# Patient Record
Sex: MALE | Race: WHITE | Employment: OTHER | URBAN - NONMETROPOLITAN AREA
[De-identification: names, ages, dates, MRNs, and addresses within clinical notes are randomized per-mention and may not be internally consistent; named-entity substitution may affect disease eponyms.]

---

## 2017-03-29 ENCOUNTER — OFFICE VISIT (OUTPATIENT)
Dept: FAMILY MEDICINE CLINIC | Age: 82
End: 2017-03-29

## 2017-03-29 VITALS
RESPIRATION RATE: 15 BRPM | OXYGEN SATURATION: 98 % | WEIGHT: 190 LBS | HEART RATE: 65 BPM | SYSTOLIC BLOOD PRESSURE: 116 MMHG | DIASTOLIC BLOOD PRESSURE: 74 MMHG | BODY MASS INDEX: 32.61 KG/M2

## 2017-03-29 DIAGNOSIS — I51.9 SYSTOLIC DYSFUNCTION, LEFT VENTRICLE: ICD-10-CM

## 2017-03-29 DIAGNOSIS — Z87.11 HISTORY OF PEPTIC ULCER: ICD-10-CM

## 2017-03-29 DIAGNOSIS — I20.8 ANGINA EFFORT: ICD-10-CM

## 2017-03-29 DIAGNOSIS — G25.0 ESSENTIAL TREMOR: ICD-10-CM

## 2017-03-29 DIAGNOSIS — D50.0 IRON DEFICIENCY ANEMIA DUE TO CHRONIC BLOOD LOSS: ICD-10-CM

## 2017-03-29 DIAGNOSIS — R53.83 FATIGUE, UNSPECIFIED TYPE: ICD-10-CM

## 2017-03-29 DIAGNOSIS — I25.10 CORONARY ARTERY DISEASE INVOLVING NATIVE CORONARY ARTERY OF NATIVE HEART WITHOUT ANGINA PECTORIS: ICD-10-CM

## 2017-03-29 DIAGNOSIS — D64.89 ANEMIA DUE TO OTHER CAUSE: ICD-10-CM

## 2017-03-29 DIAGNOSIS — N18.31 CHRONIC KIDNEY DISEASE (CKD) STAGE G3A/A1, MODERATELY DECREASED GLOMERULAR FILTRATION RATE (GFR) BETWEEN 45-59 ML/MIN/1.73 SQUARE METER AND ALBUMINURIA CREATININE RATIO LESS THAN 30 MG/G (HCC): ICD-10-CM

## 2017-03-29 DIAGNOSIS — I25.10 CORONARY ARTERY DISEASE INVOLVING NATIVE HEART WITHOUT ANGINA PECTORIS, UNSPECIFIED VESSEL OR LESION TYPE: ICD-10-CM

## 2017-03-29 DIAGNOSIS — I50.22 CHRONIC SYSTOLIC CHF (CONGESTIVE HEART FAILURE) (HCC): ICD-10-CM

## 2017-03-29 DIAGNOSIS — I10 ESSENTIAL HYPERTENSION: ICD-10-CM

## 2017-03-29 DIAGNOSIS — R06.09 DYSPNEA ON EXERTION: Primary | ICD-10-CM

## 2017-03-29 PROBLEM — D64.9 ANEMIA: Status: ACTIVE | Noted: 2017-03-29

## 2017-03-29 LAB
A/G RATIO: 1.6 (ref 1.1–2.2)
ALBUMIN SERPL-MCNC: 4.4 G/DL (ref 3.4–5)
ALP BLD-CCNC: 60 U/L (ref 40–129)
ALT SERPL-CCNC: 11 U/L (ref 10–40)
ANION GAP SERPL CALCULATED.3IONS-SCNC: 17 MMOL/L (ref 3–16)
AST SERPL-CCNC: 18 U/L (ref 15–37)
BASOPHILS ABSOLUTE: 0.1 K/UL (ref 0–0.2)
BASOPHILS RELATIVE PERCENT: 1 %
BILIRUB SERPL-MCNC: 0.4 MG/DL (ref 0–1)
BUN BLDV-MCNC: 32 MG/DL (ref 7–20)
CALCIUM SERPL-MCNC: 9.3 MG/DL (ref 8.3–10.6)
CHLORIDE BLD-SCNC: 102 MMOL/L (ref 99–110)
CO2: 21 MMOL/L (ref 21–32)
CREAT SERPL-MCNC: 1.8 MG/DL (ref 0.8–1.3)
EOSINOPHILS ABSOLUTE: 0.2 K/UL (ref 0–0.6)
EOSINOPHILS RELATIVE PERCENT: 2.1 %
GFR AFRICAN AMERICAN: 44
GFR NON-AFRICAN AMERICAN: 36
GLOBULIN: 2.7 G/DL
GLUCOSE BLD-MCNC: 91 MG/DL (ref 70–99)
HCT VFR BLD CALC: 39.5 % (ref 40.5–52.5)
HEMOGLOBIN: 13.3 G/DL (ref 13.5–17.5)
IRON SATURATION: 38 % (ref 20–50)
IRON: 128 UG/DL (ref 59–158)
LYMPHOCYTES ABSOLUTE: 1.8 K/UL (ref 1–5.1)
LYMPHOCYTES RELATIVE PERCENT: 16.8 %
MAGNESIUM: 2.1 MG/DL (ref 1.8–2.4)
MCH RBC QN AUTO: 33.7 PG (ref 26–34)
MCHC RBC AUTO-ENTMCNC: 33.6 G/DL (ref 31–36)
MCV RBC AUTO: 100.2 FL (ref 80–100)
MONOCYTES ABSOLUTE: 1.2 K/UL (ref 0–1.3)
MONOCYTES RELATIVE PERCENT: 11.2 %
NEUTROPHILS ABSOLUTE: 7.5 K/UL (ref 1.7–7.7)
NEUTROPHILS RELATIVE PERCENT: 68.9 %
PDW BLD-RTO: 16.9 % (ref 12.4–15.4)
PLATELET # BLD: 212 K/UL (ref 135–450)
PMV BLD AUTO: 8 FL (ref 5–10.5)
POTASSIUM SERPL-SCNC: 4.2 MMOL/L (ref 3.5–5.1)
RBC # BLD: 3.94 M/UL (ref 4.2–5.9)
SODIUM BLD-SCNC: 140 MMOL/L (ref 136–145)
TOTAL IRON BINDING CAPACITY: 341 UG/DL (ref 260–445)
TOTAL PROTEIN: 7.1 G/DL (ref 6.4–8.2)
WBC # BLD: 10.9 K/UL (ref 4–11)

## 2017-03-29 PROCEDURE — G8510 SCR DEP NEG, NO PLAN REQD: HCPCS | Performed by: FAMILY MEDICINE

## 2017-03-29 PROCEDURE — 99214 OFFICE O/P EST MOD 30 MIN: CPT | Performed by: FAMILY MEDICINE

## 2017-03-29 ASSESSMENT — ENCOUNTER SYMPTOMS
COUGH: 0
CHEST TIGHTNESS: 0
SORE THROAT: 0
ABDOMINAL PAIN: 0
RHINORRHEA: 0
VOMITING: 0
BACK PAIN: 1
SINUS PRESSURE: 0
CONSTIPATION: 0
WHEEZING: 0
SHORTNESS OF BREATH: 0
ABDOMINAL DISTENTION: 0
DIARRHEA: 0
BLOOD IN STOOL: 0
NAUSEA: 0

## 2017-03-29 ASSESSMENT — PATIENT HEALTH QUESTIONNAIRE - PHQ9
SUM OF ALL RESPONSES TO PHQ9 QUESTIONS 1 & 2: 2
SUM OF ALL RESPONSES TO PHQ QUESTIONS 1-9: 2
1. LITTLE INTEREST OR PLEASURE IN DOING THINGS: 1
2. FEELING DOWN, DEPRESSED OR HOPELESS: 1

## 2017-04-05 ENCOUNTER — TELEPHONE (OUTPATIENT)
Dept: FAMILY MEDICINE CLINIC | Age: 82
End: 2017-04-05

## 2017-04-05 ENCOUNTER — OFFICE VISIT (OUTPATIENT)
Dept: CARDIOLOGY CLINIC | Age: 82
End: 2017-04-05

## 2017-04-05 VITALS
WEIGHT: 192 LBS | BODY MASS INDEX: 30.13 KG/M2 | DIASTOLIC BLOOD PRESSURE: 80 MMHG | HEIGHT: 67 IN | HEART RATE: 80 BPM | RESPIRATION RATE: 16 BRPM | SYSTOLIC BLOOD PRESSURE: 116 MMHG

## 2017-04-05 DIAGNOSIS — I35.0 NONRHEUMATIC AORTIC VALVE STENOSIS: Primary | ICD-10-CM

## 2017-04-05 PROCEDURE — 99214 OFFICE O/P EST MOD 30 MIN: CPT | Performed by: INTERNAL MEDICINE

## 2017-05-12 ENCOUNTER — TELEPHONE (OUTPATIENT)
Dept: FAMILY MEDICINE CLINIC | Age: 82
End: 2017-05-12

## 2017-05-17 ENCOUNTER — TELEPHONE (OUTPATIENT)
Dept: FAMILY MEDICINE CLINIC | Age: 82
End: 2017-05-17

## 2017-06-02 RX ORDER — LISINOPRIL 2.5 MG/1
2.5 TABLET ORAL DAILY
Qty: 90 TABLET | Refills: 3 | Status: SHIPPED | OUTPATIENT
Start: 2017-06-02 | End: 2018-08-15 | Stop reason: SDUPTHER

## 2017-06-03 DIAGNOSIS — E78.2 MIXED HYPERLIPIDEMIA: ICD-10-CM

## 2017-06-03 DIAGNOSIS — I25.10 CORONARY ARTERY DISEASE INVOLVING NATIVE HEART WITHOUT ANGINA PECTORIS, UNSPECIFIED VESSEL OR LESION TYPE: ICD-10-CM

## 2017-06-03 DIAGNOSIS — I50.22 CHRONIC SYSTOLIC CHF (CONGESTIVE HEART FAILURE) (HCC): ICD-10-CM

## 2017-06-05 RX ORDER — EZETIMIBE/SIMVASTATIN 10 MG-20MG
TABLET ORAL
Qty: 90 TABLET | Refills: 3 | Status: SHIPPED | OUTPATIENT
Start: 2017-06-05 | End: 2017-10-25 | Stop reason: SDUPTHER

## 2017-07-12 ENCOUNTER — TELEPHONE (OUTPATIENT)
Dept: CARDIOLOGY CLINIC | Age: 82
End: 2017-07-12

## 2017-07-12 ENCOUNTER — OFFICE VISIT (OUTPATIENT)
Dept: CARDIOLOGY CLINIC | Age: 82
End: 2017-07-12

## 2017-07-12 VITALS
DIASTOLIC BLOOD PRESSURE: 60 MMHG | WEIGHT: 189 LBS | RESPIRATION RATE: 16 BRPM | HEIGHT: 67 IN | BODY MASS INDEX: 29.66 KG/M2 | HEART RATE: 70 BPM | SYSTOLIC BLOOD PRESSURE: 102 MMHG

## 2017-07-12 DIAGNOSIS — R06.09 DYSPNEA ON EXERTION: ICD-10-CM

## 2017-07-12 DIAGNOSIS — R42 DIZZINESS: Primary | ICD-10-CM

## 2017-07-12 DIAGNOSIS — I35.0 NONRHEUMATIC AORTIC VALVE STENOSIS: ICD-10-CM

## 2017-07-12 DIAGNOSIS — G45.8 OTHER SPECIFIED TRANSIENT CEREBRAL ISCHEMIAS: ICD-10-CM

## 2017-07-12 PROCEDURE — 99214 OFFICE O/P EST MOD 30 MIN: CPT | Performed by: INTERNAL MEDICINE

## 2017-07-12 RX ORDER — METOPROLOL TARTRATE 50 MG/1
50 TABLET, FILM COATED ORAL 2 TIMES DAILY
COMMUNITY
End: 2018-02-19 | Stop reason: SDUPTHER

## 2017-07-18 ENCOUNTER — PROCEDURE VISIT (OUTPATIENT)
Dept: CARDIOLOGY CLINIC | Age: 82
End: 2017-07-18

## 2017-07-18 DIAGNOSIS — I35.0 NONRHEUMATIC AORTIC VALVE STENOSIS: ICD-10-CM

## 2017-07-18 DIAGNOSIS — R06.09 DYSPNEA ON EXERTION: ICD-10-CM

## 2017-07-18 DIAGNOSIS — G45.8 OTHER SPECIFIED TRANSIENT CEREBRAL ISCHEMIAS: ICD-10-CM

## 2017-07-18 LAB
LV EF: 44 %
LVEF MODALITY: NORMAL

## 2017-07-18 PROCEDURE — 93018 CV STRESS TEST I&R ONLY: CPT | Performed by: INTERNAL MEDICINE

## 2017-07-18 PROCEDURE — A9500 TC99M SESTAMIBI: HCPCS | Performed by: INTERNAL MEDICINE

## 2017-07-18 PROCEDURE — 93017 CV STRESS TEST TRACING ONLY: CPT | Performed by: INTERNAL MEDICINE

## 2017-07-18 PROCEDURE — 93016 CV STRESS TEST SUPVJ ONLY: CPT | Performed by: INTERNAL MEDICINE

## 2017-07-18 PROCEDURE — 78452 HT MUSCLE IMAGE SPECT MULT: CPT | Performed by: INTERNAL MEDICINE

## 2017-07-19 ENCOUNTER — PROCEDURE VISIT (OUTPATIENT)
Dept: CARDIOLOGY CLINIC | Age: 82
End: 2017-07-19

## 2017-07-19 DIAGNOSIS — I35.0 NONRHEUMATIC AORTIC VALVE STENOSIS: ICD-10-CM

## 2017-07-19 DIAGNOSIS — R42 DIZZINESS: Primary | ICD-10-CM

## 2017-07-19 DIAGNOSIS — R06.09 DYSPNEA ON EXERTION: Primary | ICD-10-CM

## 2017-07-19 DIAGNOSIS — G45.8 OTHER SPECIFIED TRANSIENT CEREBRAL ISCHEMIAS: ICD-10-CM

## 2017-07-19 LAB
LV EF: 58 %
LVEF MODALITY: NORMAL

## 2017-07-19 PROCEDURE — 93880 EXTRACRANIAL BILAT STUDY: CPT | Performed by: INTERNAL MEDICINE

## 2017-07-19 PROCEDURE — 93306 TTE W/DOPPLER COMPLETE: CPT | Performed by: INTERNAL MEDICINE

## 2017-07-20 ENCOUNTER — TELEPHONE (OUTPATIENT)
Dept: CARDIOLOGY CLINIC | Age: 82
End: 2017-07-20

## 2017-07-21 ENCOUNTER — TELEPHONE (OUTPATIENT)
Dept: CARDIOLOGY CLINIC | Age: 82
End: 2017-07-21

## 2017-07-26 ENCOUNTER — TELEPHONE (OUTPATIENT)
Dept: CARDIOLOGY CLINIC | Age: 82
End: 2017-07-26

## 2017-07-26 ENCOUNTER — OFFICE VISIT (OUTPATIENT)
Dept: CARDIOLOGY CLINIC | Age: 82
End: 2017-07-26

## 2017-07-26 VITALS
DIASTOLIC BLOOD PRESSURE: 66 MMHG | SYSTOLIC BLOOD PRESSURE: 120 MMHG | HEART RATE: 72 BPM | HEIGHT: 66 IN | WEIGHT: 191 LBS | BODY MASS INDEX: 30.7 KG/M2

## 2017-07-26 DIAGNOSIS — I25.10 CORONARY ARTERY DISEASE INVOLVING NATIVE CORONARY ARTERY OF NATIVE HEART WITHOUT ANGINA PECTORIS: Primary | ICD-10-CM

## 2017-07-26 PROCEDURE — 99214 OFFICE O/P EST MOD 30 MIN: CPT | Performed by: INTERNAL MEDICINE

## 2017-07-26 RX ORDER — AMOXICILLIN 500 MG/1
500 CAPSULE ORAL 3 TIMES DAILY
COMMUNITY
End: 2017-10-25

## 2017-07-26 RX ORDER — IBUPROFEN 800 MG/1
800 TABLET ORAL EVERY 6 HOURS PRN
COMMUNITY
End: 2017-12-11

## 2017-08-14 ENCOUNTER — HOSPITAL ENCOUNTER (OUTPATIENT)
Dept: LAB | Age: 82
Discharge: HOME | End: 2017-08-14
Attending: PSYCHIATRY & NEUROLOGY | Admitting: PSYCHIATRY & NEUROLOGY

## 2017-08-14 LAB
ERYTHROCYTE SEDIMENTATION RATE: 46 MM/HR (ref 0–20)
FOLATE: 11.1 NG/ML (ref 3.1–17.5)
GLUCOSE, 2 HR PP: 96 MG/DL
TSH HIGH SENSITIVITY: 2.93 UIU/ML (ref 0.27–4.2)
VITAMIN B-12: 501 PG/ML (ref 211–911)

## 2017-08-16 LAB — ANTI-NUCLEAR ANTIBODY (ANA): NORMAL

## 2017-08-17 LAB — IMMUNOFIXATION ELECTROPHORESIS 1: NORMAL

## 2017-09-07 ENCOUNTER — HOSPITAL ENCOUNTER (OUTPATIENT)
Dept: NEUROLOGY | Age: 82
Discharge: HOME | End: 2017-09-07
Attending: PSYCHIATRY & NEUROLOGY | Admitting: PSYCHIATRY & NEUROLOGY

## 2017-09-07 DIAGNOSIS — G60.8 OTHER HEREDITARY AND IDIOPATHIC NEUROPATHIES: ICD-10-CM

## 2017-10-25 ENCOUNTER — OFFICE VISIT (OUTPATIENT)
Dept: CARDIOLOGY CLINIC | Age: 82
End: 2017-10-25

## 2017-10-25 VITALS
HEIGHT: 63 IN | SYSTOLIC BLOOD PRESSURE: 140 MMHG | WEIGHT: 191 LBS | BODY MASS INDEX: 33.84 KG/M2 | DIASTOLIC BLOOD PRESSURE: 50 MMHG | HEART RATE: 40 BPM

## 2017-10-25 DIAGNOSIS — R07.9 CHEST PAIN AT REST: ICD-10-CM

## 2017-10-25 DIAGNOSIS — I25.10 CORONARY ARTERY DISEASE INVOLVING NATIVE HEART WITHOUT ANGINA PECTORIS, UNSPECIFIED VESSEL OR LESION TYPE: ICD-10-CM

## 2017-10-25 DIAGNOSIS — E78.2 MIXED HYPERLIPIDEMIA: ICD-10-CM

## 2017-10-25 DIAGNOSIS — R94.39 ABNORMAL STRESS TEST: ICD-10-CM

## 2017-10-25 DIAGNOSIS — I50.22 CHRONIC SYSTOLIC CHF (CONGESTIVE HEART FAILURE) (HCC): ICD-10-CM

## 2017-10-25 DIAGNOSIS — I20.8 ANGINA EFFORT: ICD-10-CM

## 2017-10-25 DIAGNOSIS — R00.1 BRADYCARDIA: Primary | ICD-10-CM

## 2017-10-25 PROCEDURE — 93000 ELECTROCARDIOGRAM COMPLETE: CPT | Performed by: INTERNAL MEDICINE

## 2017-10-25 PROCEDURE — 99214 OFFICE O/P EST MOD 30 MIN: CPT | Performed by: INTERNAL MEDICINE

## 2017-10-25 RX ORDER — RANOLAZINE 1000 MG/1
1000 TABLET, EXTENDED RELEASE ORAL 2 TIMES DAILY
Qty: 180 TABLET | Refills: 3 | Status: SHIPPED | OUTPATIENT
Start: 2017-10-25 | End: 2018-01-24 | Stop reason: SDUPTHER

## 2017-10-25 RX ORDER — EZETIMIBE AND SIMVASTATIN 10; 20 MG/1; MG/1
TABLET ORAL
Qty: 90 TABLET | Refills: 3 | Status: SHIPPED | OUTPATIENT
Start: 2017-10-25 | End: 2018-07-06

## 2017-10-25 RX ORDER — CLOPIDOGREL BISULFATE 75 MG/1
TABLET ORAL
Qty: 30 TABLET | Refills: 11 | Status: SHIPPED | OUTPATIENT
Start: 2017-10-25 | End: 2018-07-06

## 2017-10-25 RX ORDER — RANOLAZINE 1000 MG/1
1000 TABLET, EXTENDED RELEASE ORAL 2 TIMES DAILY
Qty: 180 TABLET | Refills: 3 | Status: SHIPPED | OUTPATIENT
Start: 2017-10-25 | End: 2017-12-26 | Stop reason: SDUPTHER

## 2017-10-25 NOTE — PROGRESS NOTES
([facility] [address]     CAD (coronary artery disease)     S/P CABG x 4 6/2014    CHF (congestive heart failure) (HCC)     Chronic back pain     H/O cardiovascular stress test 5/22/2014    EF 50%. There is evidence of mild to moderate ischemia in the mid inferolateral regions.  H/O Doppler ultrasound 07/19/2017    Carotid-Moderate disease of the Bilateral internal carotid arteries. Calcific plaque noted throughout.  H/O echocardiogram 7/19/17,6/3/14    EF 55-60% Mild AS Aortic valve leaflets are somewhat thickened. Mildly enlarged right atrium size and dimension.  History of echocardiogram 12/14/2016    LV function is normal. Mild aortic Stenosis noted.  History of nuclear stress test 7/18/17, 08/18/2016    Normal LV function. Normal study. EF 44%    Hx of echocardiogram 1/26/16    EF 40%, depressed LV function, Moderate LVH, moderate to severe aortic insufficiency and mild aortic stenosis.      Hyperlipidemia     Hypertension      Past Surgical History:   Procedure Laterality Date    APPENDECTOMY      CHOLECYSTECTOMY      CORONARY ANGIOPLASTY WITH STENT PLACEMENT  2006    stent to the LAD and Circ    CORONARY ARTERY BYPASS GRAFT  6/4/14    CABG x4; LIMA to LAD & diag, SVG to CX marginal 1, SVG to CX marginal 2    EYE SURGERY Right     Cataract Extraction    EYE SURGERY Left 11/21/2014    Cataract Extraction    HERNIA REPAIR      Umbilical    TONSILLECTOMY Bilateral      Family History   Problem Relation Age of Onset    Heart Disease Father      Social History   Substance Use Topics    Smoking status: Former Smoker     Packs/day: 1.00     Years: 25.00     Quit date: 4/11/1974    Smokeless tobacco: Never Used    Alcohol use No      [unfilled]  Review of Systems:   · Constitutional: No Fever or Weight Loss   · Eyes: No Decreased Vision  · ENT: No Headaches, Hearing Loss or Vertigo  · Cardiovascular: No chest pain, dyspnea on exertion, palpitations or loss of consciousness  · Respiratory: No cough or wheezing    · Gastrointestinal: No abdominal pain, appetite loss, blood in stools, constipation, diarrhea or heartburn  · Genitourinary: No dysuria, trouble voiding, or hematuria  · Musculoskeletal:  No gait disturbance, weakness or joint complaints  · Integumentary: No rash or pruritis  · Neurological: No TIA or stroke symptoms  · Psychiatric: No anxiety or depression  · Endocrine: No malaise, fatigue or temperature intolerance  · Hematologic/Lymphatic: No bleeding problems, blood clots or swollen lymph nodes  · Allergic/Immunologic: No nasal congestion or hives  All systems negative except as marked. Objective:  BP (!) 140/50   Pulse (!) 40   Ht 5' 3\" (1.6 m)   Wt 191 lb (86.6 kg)   BMI 33.83 kg/m²   Wt Readings from Last 3 Encounters:   10/25/17 191 lb (86.6 kg)   07/26/17 191 lb (86.6 kg)   07/12/17 189 lb (85.7 kg)     Body mass index is 33.83 kg/m². GENERAL - Alert, oriented, pleasant, in no apparent distress,normal grooming  HEENT  pupils are reactive to light and accomodation, cornea intact, conjunctive normal color, ears are normal in exam,throat exam in normal, teeth, gum and palate are normal, oral mucosa is normal without any notation of pallor or cyanosis  Neck - Supple. No jugular venous distention noted. No carotid bruits, no apical -carotid delay  Respiratory:  Normal breath sounds, No respiratory distress, No wheezing, No chest tenderness. ,no use of accessory muscles, diaphragm movement is normal  Cardiovascular: (PMI) apex non displaced,no lifts no thrills, no s3,no s4, Normal heart rate, Normal rhythm, No murmurs, No rubs, No gallops.  Carotid arteries pulse and amplitude are normal no bruit, no abdominal bruit noted ( normal abdominal aorta ausculation), femoral arteries pulse and amplitude are normal no bruit, pedal pulses are normal  Femoral pulses have normal amplitude, no bruits   Extremities - No cyanosis, clubbing, or significant edema, no varicose veins    Abdomen  No masses, tenderness, or organomegaly, no hepato-splenomegally, no bruits  Musculoskeletal  No significant edema, no kyphosis or scoliosis, no deformity in any extremity noted, muscle strength and tone are normal  Skin: no ulcer,no scar,no stasis dermatitis   Neurologic  alert oriented times 3,Cranial nerves II through XII are grossly intact. There were no gross focal neurologic abnormalities. All sensory and motor nerves examined and were normal  Psychiatric: normal mood and affect    No results found for: CKTOTAL, CKMB, CKMBINDEX, TROPONINI  BNP:  No results found for: BNP  PT/INR:  No results found for: PTINR  Lab Results   Component Value Date    LABA1C 6.1 06/08/2014    LABA1C 5.8 06/03/2014     Lab Results   Component Value Date    CHOL 127 05/18/2015    TRIG 98 05/18/2015    HDL 55 05/18/2015    LDLCALC 52 05/18/2015     Lab Results   Component Value Date    ALT 11 03/29/2017    AST 18 03/29/2017     TSH:    Lab Results   Component Value Date    TSH 2.63 12/01/2016       Impression:  Gilberto Pierre is a 80 y. o.year old who  has a past medical history of Atrial fibrillation ([facility] [address] CAD (coronary artery disease); CHF (congestive heart failure) ([facility] [address] Chronic back pain; H/O cardiovascular stress test; H/O Doppler ultrasound; H/O echocardiogram; History of echocardiogram; History of nuclear stress test; Hx of echocardiogram; Hyperlipidemia; and Hypertension. and presents with     Plan:  1. Bradycardia: ekg ordered, will adjust lopressor if needed  2. Dizziness\" resolved, echo showed normal LVEF, mild AS, will recommend neurology consult for r/o TIA, stop zoloft for now  3. Shortness of breath\": STRESS TEST IS NORMAL, will not pursue it further  4. CAD and chest pain: resolved,normal stress test and echo, chest pain is stable, he feels better with 1000mg ranexa, continue plavix,vytorin and lopressor  5. Afib: now in sinus, patient is not on anticoagulation, hold  xarelto, he had GI bleeding  6.  Dyslipidemia: continue vytorin  7. HTN: better off triametene and HCTZ, COTNINUE, on lopressor,and lisinopril,   8. CKD: stable, continue to follow up with Shawn Patterson  HTN: stable, continue lopressor and  LisinoprilAll labs, medications and tests reviewed, continue all other medications of all above medical condition listed as is.     [unfilled]

## 2017-10-26 DIAGNOSIS — I10 ESSENTIAL HYPERTENSION: ICD-10-CM

## 2017-10-26 RX ORDER — TRIAMTERENE AND HYDROCHLOROTHIAZIDE 37.5; 25 MG/1; MG/1
TABLET ORAL
Qty: 90 TABLET | Refills: 3 | Status: SHIPPED | OUTPATIENT
Start: 2017-10-26 | End: 2017-12-11 | Stop reason: ALTCHOICE

## 2017-11-27 ENCOUNTER — OFFICE VISIT (OUTPATIENT)
Dept: FAMILY MEDICINE CLINIC | Age: 82
End: 2017-11-27

## 2017-11-27 VITALS
DIASTOLIC BLOOD PRESSURE: 64 MMHG | OXYGEN SATURATION: 95 % | RESPIRATION RATE: 16 BRPM | BODY MASS INDEX: 34.01 KG/M2 | SYSTOLIC BLOOD PRESSURE: 124 MMHG | HEART RATE: 74 BPM | WEIGHT: 192 LBS

## 2017-11-27 DIAGNOSIS — Z91.81 AT HIGH RISK FOR FALLS: ICD-10-CM

## 2017-11-27 DIAGNOSIS — R05.9 COUGH: Primary | ICD-10-CM

## 2017-11-27 PROCEDURE — 99213 OFFICE O/P EST LOW 20 MIN: CPT | Performed by: FAMILY MEDICINE

## 2017-11-27 PROCEDURE — 3288F FALL RISK ASSESSMENT DOCD: CPT | Performed by: FAMILY MEDICINE

## 2017-11-27 RX ORDER — GUAIFENESIN AND CODEINE PHOSPHATE 100; 10 MG/5ML; MG/5ML
5 SOLUTION ORAL EVERY 4 HOURS PRN
Qty: 180 ML | Refills: 0 | Status: SHIPPED | OUTPATIENT
Start: 2017-11-27 | End: 2017-12-11 | Stop reason: ALTCHOICE

## 2017-12-11 ENCOUNTER — OFFICE VISIT (OUTPATIENT)
Dept: FAMILY MEDICINE CLINIC | Age: 82
End: 2017-12-11

## 2017-12-11 ENCOUNTER — HOSPITAL ENCOUNTER (OUTPATIENT)
Dept: GENERAL RADIOLOGY | Age: 82
Discharge: HOME | End: 2017-12-11
Attending: NURSE PRACTITIONER | Admitting: NURSE PRACTITIONER

## 2017-12-11 VITALS
DIASTOLIC BLOOD PRESSURE: 78 MMHG | BODY MASS INDEX: 34.37 KG/M2 | SYSTOLIC BLOOD PRESSURE: 120 MMHG | OXYGEN SATURATION: 98 % | HEART RATE: 69 BPM | WEIGHT: 194 LBS | RESPIRATION RATE: 15 BRPM

## 2017-12-11 DIAGNOSIS — M25.551 RIGHT HIP PAIN: ICD-10-CM

## 2017-12-11 DIAGNOSIS — M25.551 RIGHT HIP PAIN: Primary | ICD-10-CM

## 2017-12-11 PROCEDURE — 99214 OFFICE O/P EST MOD 30 MIN: CPT | Performed by: NURSE PRACTITIONER

## 2017-12-11 ASSESSMENT — ENCOUNTER SYMPTOMS
ABDOMINAL PAIN: 0
SHORTNESS OF BREATH: 0
CONSTIPATION: 0
CHEST TIGHTNESS: 0
COUGH: 0
NAUSEA: 0
WHEEZING: 0
VOMITING: 0
DIARRHEA: 0

## 2017-12-11 NOTE — PROGRESS NOTES
SUBJECTIVE:    Esvin Jeffrey  1935  80 y.o.  male      Chief Complaint   Patient presents with    Fall     x3 over the past 2 months    Hip Pain     injury with falls, hip keeps giving out (right)     HPI     Complains of right posterior hip pain. Pain described as aching. Wife states that when he went to sit down on bleachers 3-4 separate times that he has fallen into his seat. States that he has had a neurology consult and he did not find any nerve issue. Worried that his leg is giving out on him. States that he has problems \"stepping up\" with his right leg. This was the reason for seeing the neurologist. He feels as though symptoms gradually worsening. Worse as the day progresses. Improves in swimming pool. Denies edema. Denies numbness, tingling. He has not taken anything for the symptoms. Problem List Items Addressed This Visit     None      Visit Diagnoses     Right hip pain    -  Primary    Relevant Orders    XR HIP RIGHT (2-3 VIEWS)    XR LUMBAR SPINE (2-3 VIEWS)          Review of Systems   Constitutional: Negative for appetite change, chills, fatigue and unexpected weight change. Respiratory: Negative for cough, chest tightness, shortness of breath and wheezing. Cardiovascular: Negative for chest pain and leg swelling. Gastrointestinal: Negative for abdominal pain, constipation, diarrhea, nausea and vomiting. Musculoskeletal: Positive for arthralgias. Neurological: Negative for weakness, numbness and headaches. Hematological: Negative for adenopathy. OBJECTIVE:    /78 (Site: Left Arm, Position: Sitting, Cuff Size: Medium Adult)   Pulse 69   Resp 15   Wt 194 lb (88 kg)   SpO2 98%   BMI 34.37 kg/m²     Physical Exam   Constitutional: He is oriented to person, place, and time. He appears well-developed and well-nourished. HENT:   Head: Normocephalic and atraumatic. Neck: Normal range of motion. Neck supple.    Cardiovascular: Normal rate, regular rhythm and normal heart sounds. Exam reveals no gallop and no friction rub. No murmur heard. Pulmonary/Chest: Effort normal and breath sounds normal. No respiratory distress. He has no wheezes. He has no rhonchi. He has no rales. Musculoskeletal: Normal range of motion. Right hip: He exhibits tenderness. He exhibits normal strength, no bony tenderness, no swelling and no crepitus. Lumbar back: He exhibits tenderness. He exhibits normal range of motion, no bony tenderness, no swelling and no edema. Pain with hip flexion, right side   Neurological: He is alert and oriented to person, place, and time. Skin: Skin is warm and dry. Psychiatric: He has a normal mood and affect. His behavior is normal.       ASSESSMENT/PLAN:    1. Right hip pain  Continue exercises in swimming pool may benefit from PT--will complete xray first  - XR HIP RIGHT (2-3 VIEWS); Future  - XR LUMBAR SPINE (2-3 VIEWS); Future      Return in about 2 weeks (around 12/25/2017).       (Please note that portions of this note may have been completed with a voice recognition program. Efforts were made to edit the dictations but occasionally words are mis-transcribed.)

## 2017-12-21 RX ORDER — SUCRALFATE 1 G/1
1 TABLET ORAL 4 TIMES DAILY
Qty: 360 TABLET | Refills: 3 | Status: SHIPPED | OUTPATIENT
Start: 2017-12-21 | End: 2017-12-26

## 2017-12-26 ENCOUNTER — OFFICE VISIT (OUTPATIENT)
Dept: FAMILY MEDICINE CLINIC | Age: 82
End: 2017-12-26

## 2017-12-26 VITALS
DIASTOLIC BLOOD PRESSURE: 84 MMHG | SYSTOLIC BLOOD PRESSURE: 124 MMHG | OXYGEN SATURATION: 98 % | WEIGHT: 195 LBS | BODY MASS INDEX: 34.54 KG/M2 | RESPIRATION RATE: 15 BRPM | HEART RATE: 64 BPM

## 2017-12-26 DIAGNOSIS — R26.9 GAIT DISORDER: Primary | ICD-10-CM

## 2017-12-26 DIAGNOSIS — R05.9 COUGH: ICD-10-CM

## 2017-12-26 DIAGNOSIS — M17.0 PRIMARY OSTEOARTHRITIS OF BOTH KNEES: ICD-10-CM

## 2017-12-26 DIAGNOSIS — J40 BRONCHITIS: ICD-10-CM

## 2017-12-26 PROCEDURE — 99214 OFFICE O/P EST MOD 30 MIN: CPT | Performed by: FAMILY MEDICINE

## 2017-12-26 RX ORDER — GUAIFENESIN AND CODEINE PHOSPHATE 100; 10 MG/5ML; MG/5ML
5 SOLUTION ORAL EVERY 4 HOURS PRN
Qty: 118 ML | Refills: 0 | Status: SHIPPED | OUTPATIENT
Start: 2017-12-26 | End: 2018-01-02

## 2017-12-26 RX ORDER — AZITHROMYCIN 250 MG/1
TABLET, FILM COATED ORAL
Qty: 1 PACKET | Refills: 0 | Status: SHIPPED | OUTPATIENT
Start: 2017-12-26 | End: 2017-12-26 | Stop reason: SDUPTHER

## 2017-12-26 RX ORDER — AZITHROMYCIN 250 MG/1
TABLET, FILM COATED ORAL
Qty: 1 PACKET | Refills: 0 | Status: SHIPPED | OUTPATIENT
Start: 2017-12-26 | End: 2018-01-24

## 2017-12-26 ASSESSMENT — ENCOUNTER SYMPTOMS
RHINORRHEA: 0
SINUS PRESSURE: 0
SHORTNESS OF BREATH: 0
COUGH: 0
CHEST TIGHTNESS: 0
VOMITING: 0
WHEEZING: 0
BACK PAIN: 1
CONSTIPATION: 0
BLOOD IN STOOL: 0
ABDOMINAL DISTENTION: 0
ABDOMINAL PAIN: 0
SORE THROAT: 0
DIARRHEA: 0
NAUSEA: 0

## 2018-01-24 ENCOUNTER — OFFICE VISIT (OUTPATIENT)
Dept: CARDIOLOGY CLINIC | Age: 83
End: 2018-01-24

## 2018-01-24 VITALS
SYSTOLIC BLOOD PRESSURE: 130 MMHG | HEART RATE: 80 BPM | HEIGHT: 64 IN | DIASTOLIC BLOOD PRESSURE: 68 MMHG | BODY MASS INDEX: 32.78 KG/M2 | WEIGHT: 192 LBS

## 2018-01-24 DIAGNOSIS — R07.9 CHEST PAIN AT REST: ICD-10-CM

## 2018-01-24 PROCEDURE — G8598 ASA/ANTIPLAT THER USED: HCPCS | Performed by: INTERNAL MEDICINE

## 2018-01-24 PROCEDURE — 1036F TOBACCO NON-USER: CPT | Performed by: INTERNAL MEDICINE

## 2018-01-24 PROCEDURE — G8427 DOCREV CUR MEDS BY ELIG CLIN: HCPCS | Performed by: INTERNAL MEDICINE

## 2018-01-24 PROCEDURE — 99214 OFFICE O/P EST MOD 30 MIN: CPT | Performed by: INTERNAL MEDICINE

## 2018-01-24 PROCEDURE — 1123F ACP DISCUSS/DSCN MKR DOCD: CPT | Performed by: INTERNAL MEDICINE

## 2018-01-24 PROCEDURE — 4040F PNEUMOC VAC/ADMIN/RCVD: CPT | Performed by: INTERNAL MEDICINE

## 2018-01-24 PROCEDURE — G8417 CALC BMI ABV UP PARAM F/U: HCPCS | Performed by: INTERNAL MEDICINE

## 2018-01-24 PROCEDURE — G8484 FLU IMMUNIZE NO ADMIN: HCPCS | Performed by: INTERNAL MEDICINE

## 2018-01-24 RX ORDER — RANOLAZINE 1000 MG/1
1000 TABLET, EXTENDED RELEASE ORAL 2 TIMES DAILY
Qty: 180 TABLET | Refills: 3 | Status: SHIPPED | OUTPATIENT
Start: 2018-01-24 | End: 2018-12-05 | Stop reason: SDUPTHER

## 2018-01-24 NOTE — PROGRESS NOTES
Alejandrina Kang MD        OFFICE  FOLLOWUP NOTE    Chief complaints: patient is here for management of CAD, HTN, AFIB, DYSLPIDEMIA, he had GI bleeding    Subjective: patient feels better, no chest pain, no shortness of breath, no dizziness, no palpitations    HPI Carmen Araujo is a 80 y. o.year old who  has a past medical history of Atrial fibrillation ([facility] [address] CAD (coronary artery disease); CHF (congestive heart failure) ([facility] [address] Chronic back pain; H/O cardiovascular stress test; H/O Doppler ultrasound; H/O echocardiogram; History of echocardiogram; History of nuclear stress test; Hx of echocardiogram; Hyperlipidemia; and Hypertension. and presents for management of CAD, HTN, AFIB, DYSLPIDEMIA, he had GI bleeding which are well controlled      Current Outpatient Prescriptions   Medication Sig Dispense Refill    sertraline (ZOLOFT) 100 MG tablet TAKE 1 TABLET BY MOUTH DAILY 30 tablet 3    ranolazine (RANEXA) 1000 MG extended release tablet Take 1 tablet by mouth 2 times daily 180 tablet 3    ezetimibe-simvastatin (VYTORIN) 10-20 MG per tablet TAKE 1 TABLET NIGHTLY 90 tablet 3    clopidogrel (PLAVIX) 75 MG tablet TAKE 1 TABLET DAILY 30 tablet 11    metoprolol tartrate (LOPRESSOR) 50 MG tablet Take 50 mg by mouth 2 times daily Take 1/2 tablet BID      lisinopril (PRINIVIL;ZESTRIL) 2.5 MG tablet Take 1 tablet by mouth daily 90 tablet 3    nitroGLYCERIN (NITROSTAT) 0.4 MG SL tablet Place 1 tablet under the tongue every 5 minutes as needed for Chest pain 25 tablet 3    vitamin B-1 (THIAMINE) 100 MG tablet Take 1 tablet by mouth daily 90 tablet 3     No current facility-administered medications for this visit. Allergies: Review of patient's allergies indicates no known allergies.   Past Medical History:   Diagnosis Date    [facility]     CAD (coronary artery disease)     S/P CABG x 4 6/2014    CHF (congestive heart failure) ([facility])     Chronic back pain     H/O cardiovascular stress test 5/22/2014    EF 50%. There is evidence of mild to moderate ischemia in the mid inferolateral regions.  H/O Doppler ultrasound 07/19/2017    Carotid-Moderate disease of the Bilateral internal carotid arteries. Calcific plaque noted throughout.  H/O echocardiogram 7/19/17,6/3/14    EF 55-60% Mild AS Aortic valve leaflets are somewhat thickened. Mildly enlarged right atrium size and dimension.  History of echocardiogram 12/14/2016    LV function is normal. Mild aortic Stenosis noted.  History of nuclear stress test 7/18/17, 08/18/2016    Normal LV function. Normal study. EF 44%    Hx of echocardiogram 1/26/16    EF 40%, depressed LV function, Moderate LVH, moderate to severe aortic insufficiency and mild aortic stenosis.      Hyperlipidemia     Hypertension      Past Surgical History:   Procedure Laterality Date    APPENDECTOMY      CHOLECYSTECTOMY      CORONARY ANGIOPLASTY WITH STENT PLACEMENT  2006    stent to the LAD and Circ    CORONARY ARTERY BYPASS GRAFT  6/4/14    CABG x4; LIMA to LAD & diag, SVG to CX marginal 1, SVG to CX marginal 2    EYE SURGERY Right     Cataract Extraction    EYE SURGERY Left 11/21/2014    Cataract Extraction    HERNIA REPAIR      Umbilical    TONSILLECTOMY Bilateral      Family History   Problem Relation Age of Onset    Heart Disease Father      Social History   Substance Use Topics    Smoking status: Former Smoker     Packs/day: 1.00     Years: 25.00     Quit date: 4/11/1974    Smokeless tobacco: Never Used    Alcohol use No      [unfilled]  Review of Systems:   · Constitutional: No Fever or Weight Loss   · Eyes: No Decreased Vision  · ENT: No Headaches, Hearing Loss or Vertigo  · Cardiovascular: No chest pain, dyspnea on exertion, palpitations or loss of consciousness  · Respiratory: No cough or wheezing    · Gastrointestinal: No abdominal pain, appetite loss, blood in stools, constipation, diarrhea or heartburn  · Genitourinary: No dysuria, trouble voiding,

## 2018-04-11 PROBLEM — R05.9 COUGH: Status: RESOLVED | Noted: 2017-12-26 | Resolved: 2018-04-11

## 2018-05-02 RX ORDER — NITROGLYCERIN 0.4 MG/1
0.4 TABLET SUBLINGUAL EVERY 5 MIN PRN
Qty: 25 TABLET | Refills: 2 | Status: SHIPPED | OUTPATIENT
Start: 2018-05-02

## 2018-05-24 ENCOUNTER — TELEPHONE (OUTPATIENT)
Dept: CARDIOLOGY CLINIC | Age: 83
End: 2018-05-24

## 2018-05-29 ENCOUNTER — OFFICE VISIT (OUTPATIENT)
Dept: FAMILY MEDICINE CLINIC | Age: 83
End: 2018-05-29

## 2018-05-29 ENCOUNTER — HOSPITAL ENCOUNTER (OUTPATIENT)
Dept: LAB | Age: 83
Discharge: HOME | End: 2018-05-29
Attending: NURSE PRACTITIONER | Admitting: NURSE PRACTITIONER

## 2018-05-29 VITALS
WEIGHT: 182.8 LBS | DIASTOLIC BLOOD PRESSURE: 62 MMHG | RESPIRATION RATE: 16 BRPM | OXYGEN SATURATION: 96 % | SYSTOLIC BLOOD PRESSURE: 114 MMHG | BODY MASS INDEX: 31.38 KG/M2 | HEART RATE: 77 BPM

## 2018-05-29 DIAGNOSIS — R19.7 DIARRHEA, UNSPECIFIED TYPE: ICD-10-CM

## 2018-05-29 DIAGNOSIS — M54.5 CHRONIC LOW BACK PAIN, UNSPECIFIED BACK PAIN LATERALITY, WITH SCIATICA PRESENCE UNSPECIFIED: Primary | ICD-10-CM

## 2018-05-29 DIAGNOSIS — R11.0 NAUSEA: ICD-10-CM

## 2018-05-29 DIAGNOSIS — G89.29 CHRONIC LOW BACK PAIN, UNSPECIFIED BACK PAIN LATERALITY, WITH SCIATICA PRESENCE UNSPECIFIED: Primary | ICD-10-CM

## 2018-05-29 LAB
A/G RATIO: 1.5 (ref 1.1–2.2)
ALBUMIN SERPL-MCNC: 4.2 G/DL (ref 3.4–5)
ALP BLD-CCNC: 86 U/L (ref 40–129)
ALT SERPL-CCNC: 13 U/L (ref 10–40)
ANION GAP SERPL CALCULATED.3IONS-SCNC: 16 MMOL/L (ref 3–16)
AST SERPL-CCNC: 19 U/L (ref 15–37)
BILIRUB SERPL-MCNC: 0.4 MG/DL (ref 0–1)
BUN BLDV-MCNC: 35 MG/DL (ref 7–20)
CALCIUM SERPL-MCNC: 8.6 MG/DL (ref 8.3–10.6)
CHLORIDE BLD-SCNC: 105 MMOL/L (ref 99–110)
CO2: 20 MMOL/L (ref 21–32)
CREAT SERPL-MCNC: 1.6 MG/DL (ref 0.8–1.3)
GFR AFRICAN AMERICAN: 50
GFR NON-AFRICAN AMERICAN: 42
GLOBULIN: 2.8 G/DL
GLUCOSE BLD-MCNC: 78 MG/DL (ref 70–99)
HCT VFR BLD CALC: 40.6 % (ref 40.5–52.5)
HEMOGLOBIN: 13.8 G/DL (ref 13.5–17.5)
LIPASE: 53 U/L (ref 13–60)
MCH RBC QN AUTO: 34.4 PG (ref 26–34)
MCHC RBC AUTO-ENTMCNC: 33.9 G/DL (ref 31–36)
MCV RBC AUTO: 101.4 FL (ref 80–100)
PDW BLD-RTO: 15.2 % (ref 12.4–15.4)
PLATELET # BLD: 193 K/UL (ref 135–450)
PMV BLD AUTO: 8.1 FL (ref 5–10.5)
POTASSIUM SERPL-SCNC: 4.5 MMOL/L (ref 3.5–5.1)
RBC # BLD: 4 M/UL (ref 4.2–5.9)
SODIUM BLD-SCNC: 141 MMOL/L (ref 136–145)
TOTAL PROTEIN: 7 G/DL (ref 6.4–8.2)
WBC # BLD: 10.5 K/UL (ref 4–11)

## 2018-05-29 PROCEDURE — G8427 DOCREV CUR MEDS BY ELIG CLIN: HCPCS | Performed by: NURSE PRACTITIONER

## 2018-05-29 PROCEDURE — 1036F TOBACCO NON-USER: CPT | Performed by: NURSE PRACTITIONER

## 2018-05-29 PROCEDURE — G8417 CALC BMI ABV UP PARAM F/U: HCPCS | Performed by: NURSE PRACTITIONER

## 2018-05-29 PROCEDURE — 99214 OFFICE O/P EST MOD 30 MIN: CPT | Performed by: NURSE PRACTITIONER

## 2018-05-29 PROCEDURE — G8598 ASA/ANTIPLAT THER USED: HCPCS | Performed by: NURSE PRACTITIONER

## 2018-05-29 PROCEDURE — 1123F ACP DISCUSS/DSCN MKR DOCD: CPT | Performed by: NURSE PRACTITIONER

## 2018-05-29 PROCEDURE — 4040F PNEUMOC VAC/ADMIN/RCVD: CPT | Performed by: NURSE PRACTITIONER

## 2018-05-29 RX ORDER — ACETAMINOPHEN 500 MG
1000 TABLET ORAL EVERY 6 HOURS PRN
Qty: 120 TABLET | Refills: 0 | Status: SHIPPED | OUTPATIENT
Start: 2018-05-29 | End: 2018-11-14 | Stop reason: ALTCHOICE

## 2018-05-29 RX ORDER — ONDANSETRON 4 MG/1
4 TABLET, FILM COATED ORAL DAILY PRN
Qty: 30 TABLET | Refills: 0 | Status: SHIPPED | OUTPATIENT
Start: 2018-05-29 | End: 2018-12-05

## 2018-05-29 ASSESSMENT — PATIENT HEALTH QUESTIONNAIRE - PHQ9
2. FEELING DOWN, DEPRESSED OR HOPELESS: 0
SUM OF ALL RESPONSES TO PHQ QUESTIONS 1-9: 0
1. LITTLE INTEREST OR PLEASURE IN DOING THINGS: 0
SUM OF ALL RESPONSES TO PHQ9 QUESTIONS 1 & 2: 0

## 2018-05-29 ASSESSMENT — ENCOUNTER SYMPTOMS
DIARRHEA: 1
VOMITING: 0
ABDOMINAL PAIN: 1
NAUSEA: 1
COUGH: 0
BLOOD IN STOOL: 0
ABDOMINAL DISTENTION: 0
BACK PAIN: 1
WHEEZING: 0
SHORTNESS OF BREATH: 0

## 2018-05-30 LAB
CLOSTRIDIUM DIFFICILE, PCR: NORMAL
LACTOFERRIN, QUAL: NEGATIVE

## 2018-06-01 LAB
CULTURE: NORMAL
REPORT STATUS: NORMAL
REQUEST PROBLEM: NORMAL
SPECIMEN: NORMAL

## 2018-06-07 RX ORDER — SERTRALINE HYDROCHLORIDE 100 MG/1
100 TABLET, FILM COATED ORAL DAILY
Qty: 30 TABLET | Refills: 3 | Status: SHIPPED | OUTPATIENT
Start: 2018-06-07 | End: 2018-06-07 | Stop reason: SDUPTHER

## 2018-06-07 RX ORDER — SERTRALINE HYDROCHLORIDE 100 MG/1
100 TABLET, FILM COATED ORAL DAILY
Qty: 30 TABLET | Refills: 3 | Status: SHIPPED | OUTPATIENT
Start: 2018-06-07 | End: 2018-06-20 | Stop reason: SDUPTHER

## 2018-06-20 ENCOUNTER — OFFICE VISIT (OUTPATIENT)
Dept: FAMILY MEDICINE CLINIC | Age: 83
End: 2018-06-20

## 2018-06-20 VITALS
BODY MASS INDEX: 33.03 KG/M2 | SYSTOLIC BLOOD PRESSURE: 128 MMHG | DIASTOLIC BLOOD PRESSURE: 64 MMHG | HEIGHT: 63 IN | WEIGHT: 186.4 LBS | RESPIRATION RATE: 18 BRPM

## 2018-06-20 DIAGNOSIS — E78.49 OTHER HYPERLIPIDEMIA: ICD-10-CM

## 2018-06-20 DIAGNOSIS — I50.22 CHRONIC SYSTOLIC CHF (CONGESTIVE HEART FAILURE) (HCC): ICD-10-CM

## 2018-06-20 DIAGNOSIS — I25.10 CORONARY ARTERY DISEASE INVOLVING NATIVE CORONARY ARTERY OF NATIVE HEART WITHOUT ANGINA PECTORIS: ICD-10-CM

## 2018-06-20 DIAGNOSIS — N18.31 CHRONIC KIDNEY DISEASE (CKD) STAGE G3A/A1, MODERATELY DECREASED GLOMERULAR FILTRATION RATE (GFR) BETWEEN 45-59 ML/MIN/1.73 SQUARE METER AND ALBUMINURIA CREATININE RATIO LESS THAN 30 MG/G (HCC): ICD-10-CM

## 2018-06-20 DIAGNOSIS — I10 ESSENTIAL HYPERTENSION: ICD-10-CM

## 2018-06-20 PROCEDURE — 1123F ACP DISCUSS/DSCN MKR DOCD: CPT | Performed by: NURSE PRACTITIONER

## 2018-06-20 PROCEDURE — 4040F PNEUMOC VAC/ADMIN/RCVD: CPT | Performed by: NURSE PRACTITIONER

## 2018-06-20 PROCEDURE — G8427 DOCREV CUR MEDS BY ELIG CLIN: HCPCS | Performed by: NURSE PRACTITIONER

## 2018-06-20 PROCEDURE — G8417 CALC BMI ABV UP PARAM F/U: HCPCS | Performed by: NURSE PRACTITIONER

## 2018-06-20 PROCEDURE — 99213 OFFICE O/P EST LOW 20 MIN: CPT | Performed by: NURSE PRACTITIONER

## 2018-06-20 PROCEDURE — 1036F TOBACCO NON-USER: CPT | Performed by: NURSE PRACTITIONER

## 2018-06-20 PROCEDURE — G8598 ASA/ANTIPLAT THER USED: HCPCS | Performed by: NURSE PRACTITIONER

## 2018-06-20 RX ORDER — SERTRALINE HYDROCHLORIDE 100 MG/1
100 TABLET, FILM COATED ORAL DAILY
Qty: 90 TABLET | Refills: 0 | Status: SHIPPED | OUTPATIENT
Start: 2018-06-20 | End: 2018-10-29 | Stop reason: SDUPTHER

## 2018-06-20 RX ORDER — SERTRALINE HYDROCHLORIDE 100 MG/1
100 TABLET, FILM COATED ORAL DAILY
Qty: 30 TABLET | Refills: 1 | Status: SHIPPED | OUTPATIENT
Start: 2018-06-20 | End: 2018-06-20 | Stop reason: SDUPTHER

## 2018-06-20 ASSESSMENT — ENCOUNTER SYMPTOMS
WHEEZING: 0
CONSTIPATION: 0
SHORTNESS OF BREATH: 1
COUGH: 0
ABDOMINAL PAIN: 0
NAUSEA: 0
CHEST TIGHTNESS: 0
DIARRHEA: 0
VOMITING: 0

## 2018-07-06 DIAGNOSIS — I25.10 CORONARY ARTERY DISEASE INVOLVING NATIVE HEART WITHOUT ANGINA PECTORIS, UNSPECIFIED VESSEL OR LESION TYPE: ICD-10-CM

## 2018-07-06 DIAGNOSIS — R94.39 ABNORMAL STRESS TEST: ICD-10-CM

## 2018-07-06 DIAGNOSIS — I50.22 CHRONIC SYSTOLIC CHF (CONGESTIVE HEART FAILURE) (HCC): ICD-10-CM

## 2018-07-06 DIAGNOSIS — E78.2 MIXED HYPERLIPIDEMIA: ICD-10-CM

## 2018-07-06 DIAGNOSIS — I20.8 ANGINA EFFORT: ICD-10-CM

## 2018-07-06 RX ORDER — CLOPIDOGREL BISULFATE 75 MG/1
TABLET ORAL
Qty: 30 TABLET | Refills: 11 | Status: SHIPPED | OUTPATIENT
Start: 2018-07-06 | End: 2019-06-04 | Stop reason: SDUPTHER

## 2018-07-06 RX ORDER — EZETIMIBE AND SIMVASTATIN 10; 20 MG/1; MG/1
TABLET ORAL
Qty: 90 TABLET | Refills: 3 | Status: SHIPPED | OUTPATIENT
Start: 2018-07-06 | End: 2019-06-20 | Stop reason: SDUPTHER

## 2018-07-25 ENCOUNTER — OFFICE VISIT (OUTPATIENT)
Dept: CARDIOLOGY CLINIC | Age: 83
End: 2018-07-25

## 2018-07-25 VITALS
SYSTOLIC BLOOD PRESSURE: 112 MMHG | HEIGHT: 64 IN | DIASTOLIC BLOOD PRESSURE: 74 MMHG | HEART RATE: 72 BPM | RESPIRATION RATE: 16 BRPM | BODY MASS INDEX: 32.1 KG/M2 | WEIGHT: 188 LBS

## 2018-07-25 DIAGNOSIS — R06.02 SHORTNESS OF BREATH: Primary | ICD-10-CM

## 2018-07-25 PROCEDURE — 4040F PNEUMOC VAC/ADMIN/RCVD: CPT | Performed by: INTERNAL MEDICINE

## 2018-07-25 PROCEDURE — 1036F TOBACCO NON-USER: CPT | Performed by: INTERNAL MEDICINE

## 2018-07-25 PROCEDURE — G8598 ASA/ANTIPLAT THER USED: HCPCS | Performed by: INTERNAL MEDICINE

## 2018-07-25 PROCEDURE — G8417 CALC BMI ABV UP PARAM F/U: HCPCS | Performed by: INTERNAL MEDICINE

## 2018-07-25 PROCEDURE — 99214 OFFICE O/P EST MOD 30 MIN: CPT | Performed by: INTERNAL MEDICINE

## 2018-07-25 PROCEDURE — 1123F ACP DISCUSS/DSCN MKR DOCD: CPT | Performed by: INTERNAL MEDICINE

## 2018-07-25 PROCEDURE — 1101F PT FALLS ASSESS-DOCD LE1/YR: CPT | Performed by: INTERNAL MEDICINE

## 2018-07-25 PROCEDURE — G8427 DOCREV CUR MEDS BY ELIG CLIN: HCPCS | Performed by: INTERNAL MEDICINE

## 2018-07-25 NOTE — PROGRESS NOTES
and falls  3. Dyslipidemia: continue statins  4. HTN: stable, continue lopressor and lisinopril medicatons  5. Dizziness: as per neurology  6. Health maintenance: exerise and diet  All labs, medications and tests reviewed, continue all other medications of all above medical condition listed as is.

## 2018-07-31 ENCOUNTER — PROCEDURE VISIT (OUTPATIENT)
Dept: CARDIOLOGY CLINIC | Age: 83
End: 2018-07-31

## 2018-07-31 DIAGNOSIS — I25.10 CORONARY ARTERY DISEASE INVOLVING NATIVE CORONARY ARTERY OF NATIVE HEART WITHOUT ANGINA PECTORIS: Primary | ICD-10-CM

## 2018-07-31 DIAGNOSIS — R06.02 SHORTNESS OF BREATH: ICD-10-CM

## 2018-07-31 DIAGNOSIS — R06.02 SHORTNESS OF BREATH: Primary | ICD-10-CM

## 2018-07-31 LAB
LV EF: 38 %
LV EF: 45 %
LVEF MODALITY: NORMAL
LVEF MODALITY: NORMAL

## 2018-07-31 PROCEDURE — 93017 CV STRESS TEST TRACING ONLY: CPT | Performed by: INTERNAL MEDICINE

## 2018-07-31 PROCEDURE — 93306 TTE W/DOPPLER COMPLETE: CPT | Performed by: INTERNAL MEDICINE

## 2018-07-31 PROCEDURE — 93018 CV STRESS TEST I&R ONLY: CPT | Performed by: INTERNAL MEDICINE

## 2018-07-31 PROCEDURE — A9500 TC99M SESTAMIBI: HCPCS | Performed by: INTERNAL MEDICINE

## 2018-07-31 PROCEDURE — 93016 CV STRESS TEST SUPVJ ONLY: CPT | Performed by: INTERNAL MEDICINE

## 2018-07-31 PROCEDURE — 78452 HT MUSCLE IMAGE SPECT MULT: CPT | Performed by: INTERNAL MEDICINE

## 2018-08-01 ENCOUNTER — TELEPHONE (OUTPATIENT)
Dept: CARDIOLOGY CLINIC | Age: 83
End: 2018-08-01

## 2018-08-08 ENCOUNTER — OFFICE VISIT (OUTPATIENT)
Dept: CARDIOLOGY CLINIC | Age: 83
End: 2018-08-08

## 2018-08-08 VITALS
HEIGHT: 65 IN | DIASTOLIC BLOOD PRESSURE: 60 MMHG | BODY MASS INDEX: 31.42 KG/M2 | SYSTOLIC BLOOD PRESSURE: 118 MMHG | WEIGHT: 188.6 LBS | HEART RATE: 72 BPM

## 2018-08-08 DIAGNOSIS — I20.8 ANGINA EFFORT: Primary | ICD-10-CM

## 2018-08-08 PROCEDURE — G8417 CALC BMI ABV UP PARAM F/U: HCPCS | Performed by: INTERNAL MEDICINE

## 2018-08-08 PROCEDURE — 1101F PT FALLS ASSESS-DOCD LE1/YR: CPT | Performed by: INTERNAL MEDICINE

## 2018-08-08 PROCEDURE — G8427 DOCREV CUR MEDS BY ELIG CLIN: HCPCS | Performed by: INTERNAL MEDICINE

## 2018-08-08 PROCEDURE — 1123F ACP DISCUSS/DSCN MKR DOCD: CPT | Performed by: INTERNAL MEDICINE

## 2018-08-08 PROCEDURE — 99214 OFFICE O/P EST MOD 30 MIN: CPT | Performed by: INTERNAL MEDICINE

## 2018-08-08 PROCEDURE — 1036F TOBACCO NON-USER: CPT | Performed by: INTERNAL MEDICINE

## 2018-08-08 PROCEDURE — 4040F PNEUMOC VAC/ADMIN/RCVD: CPT | Performed by: INTERNAL MEDICINE

## 2018-08-08 PROCEDURE — G8598 ASA/ANTIPLAT THER USED: HCPCS | Performed by: INTERNAL MEDICINE

## 2018-08-08 NOTE — PROGRESS NOTES
Patient here in office and educated on [facility], schedule for 08/16/2018 @ 0800, with arrival @ 0600, @ [facility]; risk explained; and consents signed. Also copy of orders given for labs and CXR due 08/15/2018 at [facility]. Instruction given to patient to :  NPO after midnight the night before procedure; call hospital at [phone] to pre-register. May take rest of morning meds of procedure  Patient voiced understanding. Copies of consent & info sheet given to [facility] for scanning.
bruit noted ( normal abdominal aorta ausculation), femoral arteries pulse and amplitude are normal no bruit, pedal pulses are normal  Femoral pulses have normal amplitude, no bruits   Extremities - No cyanosis, clubbing, or significant edema, no varicose veins    Abdomen  No masses, tenderness, or organomegaly, no hepato-splenomegally, no bruits  Musculoskeletal  No significant edema, no kyphosis or scoliosis, no deformity in any extremity noted, muscle strength and tone are normal  Skin: no ulcer,no scar,no stasis dermatitis   Neurologic  alert oriented times 3,Cranial nerves II through XII are grossly intact. There were no gross focal neurologic abnormalities. All sensory and motor nerves examined and were normal  Psychiatric: normal mood and affect    No results found for: CKTOTAL, CKMB, CKMBINDEX, TROPONINI  BNP:  No results found for: BNP  PT/INR:  No results found for: PTINR  Lab Results   Component Value Date    LABA1C 6.1 06/08/2014    LABA1C 5.8 06/03/2014     Lab Results   Component Value Date    CHOL 127 05/18/2015    TRIG 98 05/18/2015    HDL 55 05/18/2015    LDLCALC 52 05/18/2015     Lab Results   Component Value Date    ALT 13 05/29/2018    AST 19 05/29/2018     TSH:    Lab Results   Component Value Date    TSH 2.63 12/01/2016       Impression:  Cha Young is a 80 y. o.year old who  has a past medical history of Atrial fibrillation ([facility] [address] CAD (coronary artery disease); CHF (congestive heart failure) ([facility] [address] Chronic back pain; H/O cardiovascular stress test; H/O Doppler ultrasound; H/O echocardiogram; History of echocardiogram; History of nuclear stress test; Hx of echocardiogram; Hyperlipidemia; and Hypertension. and presents with     Plan:  1. CAD s/p CABG and history of PCI before CABG, has dypsnea on exertion: will get stress test and echo reviewed in detail and plan to get [facility], case explained and discussed with patient and family. Continue plavix, vytorin, ranexa. lisinoril and lopressor  2.  Paroxysmal

## 2018-08-13 ENCOUNTER — TELEPHONE (OUTPATIENT)
Dept: CARDIOLOGY CLINIC | Age: 83
End: 2018-08-13

## 2018-08-14 ENCOUNTER — HOSPITAL ENCOUNTER (OUTPATIENT)
Dept: GENERAL RADIOLOGY | Age: 83
Discharge: HOME | End: 2018-08-14
Attending: INTERNAL MEDICINE | Admitting: INTERNAL MEDICINE

## 2018-08-14 ENCOUNTER — HOSPITAL ENCOUNTER (OUTPATIENT)
Dept: OTHER | Age: 83
Discharge: HOME | End: 2018-08-14
Attending: INTERNAL MEDICINE | Admitting: INTERNAL MEDICINE

## 2018-08-14 DIAGNOSIS — Z01.811 PRE-OP CHEST EXAM: ICD-10-CM

## 2018-08-14 LAB
ANION GAP SERPL CALCULATED.3IONS-SCNC: 12 MMOL/L (ref 4–16)
APTT: 29.9 SECONDS (ref 24–40)
BASOPHILS ABSOLUTE: 0.1 K/CU MM
BASOPHILS RELATIVE PERCENT: 0.9 % (ref 0–1)
BUN BLDV-MCNC: 24 MG/DL (ref 6–23)
CALCIUM SERPL-MCNC: 9.1 MG/DL (ref 8.3–10.6)
CHLORIDE BLD-SCNC: 100 MMOL/L (ref 99–110)
CO2: 28 MMOL/L (ref 21–32)
CREAT SERPL-MCNC: 1.6 MG/DL (ref 0.9–1.3)
DIFFERENTIAL TYPE: ABNORMAL
EOSINOPHILS ABSOLUTE: 0.3 K/CU MM
EOSINOPHILS RELATIVE PERCENT: 3 % (ref 0–3)
GFR AFRICAN AMERICAN: 50 ML/MIN/1.73M2
GFR NON-AFRICAN AMERICAN: 42 ML/MIN/1.73M2
GLUCOSE BLD-MCNC: 92 MG/DL (ref 70–99)
HCT VFR BLD CALC: 37.3 % (ref 42–52)
HEMOGLOBIN: 12 GM/DL (ref 13.5–18)
IMMATURE NEUTROPHIL %: 1.5 % (ref 0–0.43)
INR BLD: 1.08 INDEX
LYMPHOCYTES ABSOLUTE: 2 K/CU MM
LYMPHOCYTES RELATIVE PERCENT: 19.7 % (ref 24–44)
MCH RBC QN AUTO: 33.8 PG (ref 27–31)
MCHC RBC AUTO-ENTMCNC: 32.2 % (ref 32–36)
MCV RBC AUTO: 105.1 FL (ref 78–100)
MONOCYTES ABSOLUTE: 1 K/CU MM
MONOCYTES RELATIVE PERCENT: 9.6 % (ref 0–4)
PDW BLD-RTO: 14.3 % (ref 11.7–14.9)
PLATELET # BLD: 160 K/CU MM (ref 140–440)
PMV BLD AUTO: 9.1 FL (ref 7.5–11.1)
POTASSIUM SERPL-SCNC: 5.3 MMOL/L (ref 3.5–5.1)
PROTHROMBIN TIME: 12.3 SECONDS (ref 10–14.3)
RBC # BLD: 3.55 M/CU MM (ref 4.6–6.2)
SEGMENTED NEUTROPHILS ABSOLUTE COUNT: 6.8 K/CU MM
SEGMENTED NEUTROPHILS RELATIVE PERCENT: 65.3 % (ref 36–66)
SODIUM BLD-SCNC: 140 MMOL/L (ref 135–145)
TOTAL IMMATURE NEUTOROPHIL: 0.16 K/CU MM
WBC # BLD: 10.4 K/CU MM (ref 4–10.5)

## 2018-08-15 ENCOUNTER — TELEPHONE (OUTPATIENT)
Dept: CARDIOLOGY CLINIC | Age: 83
End: 2018-08-15

## 2018-08-15 RX ORDER — LISINOPRIL 2.5 MG/1
TABLET ORAL
Qty: 90 TABLET | Refills: 3 | Status: SHIPPED | OUTPATIENT
Start: 2018-08-15 | End: 2019-07-30 | Stop reason: SDUPTHER

## 2018-08-20 ENCOUNTER — TELEPHONE (OUTPATIENT)
Dept: CARDIOLOGY CLINIC | Age: 83
End: 2018-08-20

## 2018-09-05 ENCOUNTER — OFFICE VISIT (OUTPATIENT)
Dept: CARDIOLOGY CLINIC | Age: 83
End: 2018-09-05

## 2018-09-05 VITALS
BODY MASS INDEX: 32.25 KG/M2 | HEART RATE: 72 BPM | HEIGHT: 63 IN | SYSTOLIC BLOOD PRESSURE: 118 MMHG | RESPIRATION RATE: 16 BRPM | DIASTOLIC BLOOD PRESSURE: 58 MMHG | WEIGHT: 182 LBS

## 2018-09-05 DIAGNOSIS — R06.02 SHORTNESS OF BREATH: Primary | ICD-10-CM

## 2018-09-05 PROCEDURE — 1123F ACP DISCUSS/DSCN MKR DOCD: CPT | Performed by: INTERNAL MEDICINE

## 2018-09-05 PROCEDURE — G8427 DOCREV CUR MEDS BY ELIG CLIN: HCPCS | Performed by: INTERNAL MEDICINE

## 2018-09-05 PROCEDURE — 1036F TOBACCO NON-USER: CPT | Performed by: INTERNAL MEDICINE

## 2018-09-05 PROCEDURE — G8598 ASA/ANTIPLAT THER USED: HCPCS | Performed by: INTERNAL MEDICINE

## 2018-09-05 PROCEDURE — G8417 CALC BMI ABV UP PARAM F/U: HCPCS | Performed by: INTERNAL MEDICINE

## 2018-09-05 PROCEDURE — 1101F PT FALLS ASSESS-DOCD LE1/YR: CPT | Performed by: INTERNAL MEDICINE

## 2018-09-05 PROCEDURE — 4040F PNEUMOC VAC/ADMIN/RCVD: CPT | Performed by: INTERNAL MEDICINE

## 2018-09-05 PROCEDURE — 99214 OFFICE O/P EST MOD 30 MIN: CPT | Performed by: INTERNAL MEDICINE

## 2018-09-05 NOTE — PROGRESS NOTES
Jessica Burgos MD        OFFICE  FOLLOWUP NOTE    Chief complaints: patient is here for management of CAD cabg , HTN, AFIB, DYSLPIDEMIA, he had GI bleeding  Subjective: + shortness of breath, no dizziness, no palpitations    HPI Patricia Coronel is a 80 y. o.year old who  has a past medical history of Atrial fibrillation ([facility] [address] CAD (coronary artery disease); CHF (congestive heart failure) ([facility] [address] Chronic back pain; H/O cardiovascular stress test; H/O Doppler ultrasound; H/O echocardiogram; History of echocardiogram; History of nuclear stress test; Hx of echocardiogram; Hyperlipidemia; and Hypertension.  and presents for management of CAD cabg , HTN, AFIB, DYSLPIDEMIA, he had GI bleeding which are well controlled, patient has for shortness of breath which is moderate, for many years, intermittent, self limiting, not associated with cough or fever, gets worse with activity and better with rest.  He had LHC which showed patent 4/4 grafts with severe vessel disease        Current Outpatient Prescriptions   Medication Sig Dispense Refill    lisinopril (PRINIVIL;ZESTRIL) 2.5 MG tablet TAKE 1 TABLET DAILY 90 tablet 3    clopidogrel (PLAVIX) 75 MG tablet TAKE 1 TABLET DAILY 30 tablet 11    ezetimibe-simvastatin (VYTORIN) 10-20 MG per tablet TAKE 1 TABLET NIGHTLY 90 tablet 3    sertraline (ZOLOFT) 100 MG tablet Take 1 tablet by mouth daily 90 tablet 0    ondansetron (ZOFRAN) 4 MG tablet Take 1 tablet by mouth daily as needed for Nausea or Vomiting 30 tablet 0    acetaminophen (APAP EXTRA STRENGTH) 500 MG tablet Take 2 tablets by mouth every 6 hours as needed for Pain 120 tablet 0    nitroGLYCERIN (NITROSTAT) 0.4 MG SL tablet PLACE 1 TABLET UNDER THE TONGUE EVERY 5 MINUTES AS NEEDED FOR CHEST PAIN 25 tablet 2    metoprolol tartrate (LOPRESSOR) 25 MG tablet Take 1 tablet by mouth 2 times daily (Patient taking differently: Take 12.5 mg by mouth 2 times daily ) 180 tablet 3    ranolazine (RANEXA) 1000 MG extended release tablet Take 1 tablet by mouth 2 times daily 180 tablet 3    vitamin B-1 (THIAMINE) 100 MG tablet Take 1 tablet by mouth daily 90 tablet 3     No current facility-administered medications for this visit. Allergies: Patient has no known allergies. Past Medical History:   Diagnosis Date    Atrial fibrillation ([address]     CAD (coronary artery disease)     S/P CABG x 4 6/2014    CH[facility] (congest[facility] heart failure) ([facility])     Chronic back pain     H/O cardiovascular stress test 5/22/2014    EF 50%. There is evidence of mild to moderate ischemia in the mid inferolateral regions.  H/O Doppler ultrasound 07/19/2017    Carotid-Moderate disease of the Bilateral internal carotid arteries. Calcific plaque noted throughout.  H/O echocardiogram 7/19/17,6/3/14    EF 55-60% Mild AS Aortic valve leaflets are somewhat thickened. Mildly enlarged right atrium size and dimension.  History of echocardiogram 12/14/2016    LV function is normal. Mild aortic Stenosis noted.  History of nuclear stress test 7/18/17, 7/31/2018    Normal LV function. Normal study. EF 45%.  Hx of echocardiogram 1/26/16    EF 40%, depressed LV function, Moderate LVH, moderate to severe aortic insufficiency and mild aortic stenosis.      Hyperlipidemia     Hypertension      Past Surgical History:   Procedure Laterality Date    APPENDECTOMY      CHOLECYSTECTOMY      CORONARY ANGIOPLASTY WITH STENT PLACEMENT  2006    stent to the LAD and Circ    CORONARY ARTERY BYPASS GRAFT  6/4/14    CABG x4; LIMA to LAD & diag, SVG to CX marginal 1, SVG to CX marginal 2    EYE SURGERY Right     Cataract Extraction    EYE SURGERY Left 11/21/2014    Cataract Extraction    HERNIA REPAIR      Umbilical    TONSILLECTOMY Bilateral      Family History   Problem Relation Age of Onset    Heart Disease Father      Social History   Substance Use Topics    Smoking status: Former Smoker     Packs/day: 1.00     Years: 25.00     Quit date: 4/11/1974   [facility] and amplitude are normal no bruit, no abdominal bruit noted ( normal abdominal aorta ausculation), femoral arteries pulse and amplitude are normal no bruit, pedal pulses are normal  Femoral pulses have normal amplitude, no bruits   Extremities - No cyanosis, clubbing, or significant edema, no varicose veins    Abdomen  No masses, tenderness, or organomegaly, no hepato-splenomegally, no bruits  Musculoskeletal  No significant edema, no kyphosis or scoliosis, no deformity in any extremity noted, muscle strength and tone are normal  Skin: no ulcer,no scar,no stasis dermatitis   Neurologic  alert oriented times 3,Cranial nerves II through XII are grossly intact. There were no gross focal neurologic abnormalities. All sensory and motor nerves examined and were normal  Psychiatric: normal mood and affect    No results found for: CKTOTAL, CKMB, CKMBINDEX, TROPONINI  BNP:  No results found for: BNP  PT/INR:  No results found for: PTINR  Lab Results   Component Value Date    LABA1C 6.1 06/08/2014    LABA1C 5.8 06/03/2014     Lab Results   Component Value Date    CHOL 127 05/18/2015    TRIG 98 05/18/2015    HDL 55 05/18/2015    LDLCALC 52 05/18/2015     Lab Results   Component Value Date    ALT 13 05/29/2018    AST 19 05/29/2018     TSH:    Lab Results   Component Value Date    TSH 2.63 12/01/2016       Impression:  Néstor Correa is a 80 y. o.year old who  has a past medical history of Atrial fibrillation ([facility] [address] CAD (coronary artery disease); CHF (congestive heart failure) ([facility] [address] Chronic back pain; H/O cardiovascular stress test; H/O Doppler ultrasound; H/O echocardiogram; History of echocardiogram; History of nuclear stress test; Hx of echocardiogram; Hyperlipidemia; and Hypertension. and presents with     Plan:  1. CAD s/p CABG and history of PCI before CABG, has dypsnea on exertion:  [place], reviewed, he has patent 4/4 grafts, his LVEF is 45% on NM,its been low for few yrs, case explained and discussed with patient and family.

## 2018-09-06 ENCOUNTER — TELEPHONE (OUTPATIENT)
Dept: CARDIOLOGY CLINIC | Age: 83
End: 2018-09-06

## 2018-09-19 ENCOUNTER — INITIAL CONSULT (OUTPATIENT)
Dept: PULMONOLOGY | Age: 83
End: 2018-09-19

## 2018-09-19 VITALS
HEIGHT: 65 IN | OXYGEN SATURATION: 94 % | WEIGHT: 187.6 LBS | SYSTOLIC BLOOD PRESSURE: 118 MMHG | BODY MASS INDEX: 31.25 KG/M2 | DIASTOLIC BLOOD PRESSURE: 56 MMHG | HEART RATE: 64 BPM

## 2018-09-19 DIAGNOSIS — G47.19 EXCESSIVE DAYTIME SLEEPINESS: ICD-10-CM

## 2018-09-19 DIAGNOSIS — R06.02 SHORTNESS OF BREATH: Primary | ICD-10-CM

## 2018-09-19 LAB
DLCO %PRED: NORMAL
DLCO PRE: NORMAL
FEF 25-75%-POST: 2.76
FEF 25-75%-PRE: 2.72
FEV1-POST: 2.06
FEV1-PRE: 2.1
FEV1/FVC-POST: 84.7
FEV1/FVC-PRE: 82.1
FVC-POST: 2.43
FVC-PRE: 2.55
MEP: NORMAL
MIP: NORMAL
TLC %PRED: NORMAL
TLC PRE: NORMAL

## 2018-09-19 PROCEDURE — 99203 OFFICE O/P NEW LOW 30 MIN: CPT | Performed by: INTERNAL MEDICINE

## 2018-09-19 PROCEDURE — 1101F PT FALLS ASSESS-DOCD LE1/YR: CPT | Performed by: INTERNAL MEDICINE

## 2018-09-19 PROCEDURE — 1123F ACP DISCUSS/DSCN MKR DOCD: CPT | Performed by: INTERNAL MEDICINE

## 2018-09-19 PROCEDURE — 4040F PNEUMOC VAC/ADMIN/RCVD: CPT | Performed by: INTERNAL MEDICINE

## 2018-09-19 PROCEDURE — G8598 ASA/ANTIPLAT THER USED: HCPCS | Performed by: INTERNAL MEDICINE

## 2018-09-19 PROCEDURE — G8417 CALC BMI ABV UP PARAM F/U: HCPCS | Performed by: INTERNAL MEDICINE

## 2018-09-19 PROCEDURE — G8427 DOCREV CUR MEDS BY ELIG CLIN: HCPCS | Performed by: INTERNAL MEDICINE

## 2018-09-19 PROCEDURE — 1036F TOBACCO NON-USER: CPT | Performed by: INTERNAL MEDICINE

## 2018-09-19 ASSESSMENT — PULMONARY FUNCTION TESTS
FVC_POST: 2.43
FVC_PRE: 2.55
FEV1_POST: 2.06
FEV1_PRE: 2.10
FEV1/FVC_POST: 84.7
FEV1/FVC_PRE: 82.1

## 2018-09-19 NOTE — PROGRESS NOTES
Medical History:  Past Medical History:   Diagnosis Date    Atrial fibrillation ([address]     CAD (coronary artery disease)     S/P CABG x 4 6/2014    CH[facility]congestive [facility] failure) ([facility])     Chronic back pain     Excessive daytime sleepiness 9/19/2018    H/O cardiovascular stress test 5/22/2014    EF 50%. There is evidence of mild to moderate ischemia in the mid inferolateral regions.  H/O Doppler ultrasound 07/19/2017    Carotid-Moderate disease of the Bilateral internal carotid arteries. Calcific plaque noted throughout.  H/O echocardiogram 7/19/17,6/3/14    EF 55-60% Mild AS Aortic valve leaflets are somewhat thickened. Mildly enlarged right atrium size and dimension.  History of echocardiogram 12/14/2016    LV function is normal. Mild aortic Stenosis noted.  History of nuclear stress test 7/18/17, 7/31/2018    Normal LV function. Normal study. EF 45%.  Hx of echocardiogram 1/26/16    EF 40%, depressed LV function, Moderate LVH, moderate to severe aortic insufficiency and mild aortic stenosis.  Hyperlipidemia     Hypertension        Current Medications:    No current facility-administered medications for this visit. No Known Allergies    Social History:    Social History     Social History    Marital status:      Spouse name: N/A    Number of children: N/A    Years of education: N/A     Social History Main Topics    Smoking status: Former Smoker     Packs/day: 1.00     Years: 25.00     Quit date: 4/11/1974    Smokeless tobacco: Never Used    Alcohol use No    Drug use: No    Sexual activity: Yes     Partners: Female      Comment:      Other Topics Concern    None     Social History Narrative    None       Family History:    Family History   Problem Relation Age of Onset    Heart Disease Father          REVIEW OF SYSTEMS:    CONSTITUTIONAL:  negative for fevers, chills, diaphoresis,  appetite change, night sweats and unexpected weight change.    HEENT: 3, No focal deficits  SKIN: No rashes, No lesions  LAB:creatinine 1.6 on 8/14/18    RADIOLOGY: chest x-ray on 8/21/18  Sternal wires are again seen indicating prior cardiac surgery.  Cardiac size   upper limits of normal.  Pulmonary vascularity within normal limits. Atherosclerotic calcification seen within the thoracic aorta.  No focal   alveolar infiltrate or consolidation.  No pleural effusion or pneumothorax. Minimal pleural thickening noted along the lateral aspect of the lower half   of both lungs seen in the PA projection.  This appears chronic.  Moderate   calcification noted near the right carotid bifurcation in the neck.           Impression   No acute pulmonary finding. CT chest on 6/3/14  1. Mild upper lobe predominant emphysema with superimposed mild   background reactive airways disease. 2. Coronary arterial calcifications are identified in a   triple-vessel distribution. Changes related to atherosclerosis   with peripheral vascular arterial disease. Negative for thoracic   aortic aneurysm. 3. Cholecystectomy. Incidental note of prominent duodenal   diverticulum. 4. Chronic L2 superior endplate compression fracture noted   incidentally     Echocardiogram on 7/31/18  Left ventricular systolic function is moderately depressed with an ejection   fraction of 35-40%.  Grade I diastolic dysfunction.   Mild septal wall asymmetrical left ventricular hypertrophy.   Dyskinetic motion of the septal wall noted in the left ventricle.   Mild dilatation of the aortic root.   Mild aortic stenosis is present.   Mitral annular calcification is present.   Doppler evaluation reveals moderate aortic, and mild mitral and tricuspid   regurgitation.   Right ventricular systolic pressure of 38 mmHg consistent with mild   pulmonary hypertension.   No evidence of pericardial effusion. PFT: office spirometry demonstrates no significant airways obstruction.  Following bronchodilator she had no specific

## 2018-09-25 DIAGNOSIS — R06.02 SHORTNESS OF BREATH: ICD-10-CM

## 2018-10-09 ENCOUNTER — HOSPITAL ENCOUNTER (OUTPATIENT)
Age: 83
Discharge: HOME | End: 2018-10-09
Payer: MEDICARE

## 2018-10-09 LAB
ANION GAP SERPL CALCULATED.3IONS-SCNC: 9 MMOL/L (ref 4–16)
BUN BLDV-MCNC: 30 MG/DL (ref 6–23)
CALCIUM SERPL-MCNC: 8.8 MG/DL (ref 8.3–10.6)
CHLORIDE BLD-SCNC: 104 MMOL/L (ref 99–110)
CO2: 28 MMOL/L (ref 21–32)
CREAT SERPL-MCNC: 1.5 MG/DL (ref 0.9–1.3)
D DIMER: 267 NG/ML(DDU)
GFR AFRICAN AMERICAN: 54 ML/MIN/1.73M2
GFR NON-AFRICAN AMERICAN: 45 ML/MIN/1.73M2
GLUCOSE FASTING: 89 MG/DL (ref 70–99)
POTASSIUM SERPL-SCNC: 5.3 MMOL/L (ref 3.5–5.1)
PRO-BNP: 1883 PG/ML
SODIUM BLD-SCNC: 141 MMOL/L (ref 135–145)

## 2018-10-09 PROCEDURE — 83880 ASSAY OF NATRIURETIC PEPTIDE: CPT

## 2018-10-09 PROCEDURE — 36415 COLL VENOUS BLD VENIPUNCTURE: CPT

## 2018-10-09 PROCEDURE — 80048 BASIC METABOLIC PNL TOTAL CA: CPT

## 2018-10-09 PROCEDURE — 85379 FIBRIN DEGRADATION QUANT: CPT

## 2018-10-10 ENCOUNTER — OFFICE VISIT (OUTPATIENT)
Dept: FAMILY MEDICINE CLINIC | Age: 83
End: 2018-10-10
Payer: MEDICARE

## 2018-10-10 ENCOUNTER — HOSPITAL ENCOUNTER (OUTPATIENT)
Age: 83
Discharge: HOME | End: 2018-10-10
Payer: MEDICARE

## 2018-10-10 ENCOUNTER — HOSPITAL ENCOUNTER (OUTPATIENT)
Dept: GENERAL RADIOLOGY | Age: 83
Discharge: HOME | End: 2018-10-10
Payer: MEDICARE

## 2018-10-10 VITALS
BODY MASS INDEX: 31.16 KG/M2 | OXYGEN SATURATION: 96 % | DIASTOLIC BLOOD PRESSURE: 62 MMHG | RESPIRATION RATE: 24 BRPM | HEART RATE: 64 BPM | WEIGHT: 184.4 LBS | SYSTOLIC BLOOD PRESSURE: 118 MMHG

## 2018-10-10 DIAGNOSIS — M54.6 ACUTE RIGHT-SIDED THORACIC BACK PAIN: ICD-10-CM

## 2018-10-10 DIAGNOSIS — M54.6 ACUTE RIGHT-SIDED THORACIC BACK PAIN: Primary | ICD-10-CM

## 2018-10-10 DIAGNOSIS — I10 ESSENTIAL HYPERTENSION: ICD-10-CM

## 2018-10-10 DIAGNOSIS — R29.6 FALLS FREQUENTLY: ICD-10-CM

## 2018-10-10 DIAGNOSIS — R07.81 PLEURITIC PAIN: ICD-10-CM

## 2018-10-10 DIAGNOSIS — R10.9 RIGHT FLANK PAIN: ICD-10-CM

## 2018-10-10 DIAGNOSIS — R07.2 SUBSTERNAL CHEST PAIN: ICD-10-CM

## 2018-10-10 LAB
BILIRUBIN, POC: NEGATIVE
BLOOD URINE, POC: NEGATIVE
CLARITY, POC: NORMAL
COLOR, POC: NORMAL
GLUCOSE URINE, POC: NEGATIVE
KETONES, POC: NEGATIVE
LEUKOCYTE EST, POC: NEGATIVE
NITRITE, POC: NEGATIVE
PH, POC: 5.5
PROTEIN, POC: NEGATIVE
SPECIFIC GRAVITY, POC: >=1.03
UROBILINOGEN, POC: 0.2

## 2018-10-10 PROCEDURE — 1036F TOBACCO NON-USER: CPT | Performed by: FAMILY MEDICINE

## 2018-10-10 PROCEDURE — 1101F PT FALLS ASSESS-DOCD LE1/YR: CPT | Performed by: FAMILY MEDICINE

## 2018-10-10 PROCEDURE — G8482 FLU IMMUNIZE ORDER/ADMIN: HCPCS | Performed by: FAMILY MEDICINE

## 2018-10-10 PROCEDURE — 1123F ACP DISCUSS/DSCN MKR DOCD: CPT | Performed by: FAMILY MEDICINE

## 2018-10-10 PROCEDURE — G8427 DOCREV CUR MEDS BY ELIG CLIN: HCPCS | Performed by: FAMILY MEDICINE

## 2018-10-10 PROCEDURE — 99214 OFFICE O/P EST MOD 30 MIN: CPT | Performed by: FAMILY MEDICINE

## 2018-10-10 PROCEDURE — 71100 X-RAY EXAM RIBS UNI 2 VIEWS: CPT

## 2018-10-10 PROCEDURE — 71046 X-RAY EXAM CHEST 2 VIEWS: CPT

## 2018-10-10 PROCEDURE — 81002 URINALYSIS NONAUTO W/O SCOPE: CPT | Performed by: FAMILY MEDICINE

## 2018-10-10 PROCEDURE — G8598 ASA/ANTIPLAT THER USED: HCPCS | Performed by: FAMILY MEDICINE

## 2018-10-10 PROCEDURE — 93000 ELECTROCARDIOGRAM COMPLETE: CPT | Performed by: FAMILY MEDICINE

## 2018-10-10 PROCEDURE — G8417 CALC BMI ABV UP PARAM F/U: HCPCS | Performed by: FAMILY MEDICINE

## 2018-10-10 PROCEDURE — 4040F PNEUMOC VAC/ADMIN/RCVD: CPT | Performed by: FAMILY MEDICINE

## 2018-10-10 RX ORDER — TRAMADOL HYDROCHLORIDE 50 MG/1
50 TABLET ORAL EVERY 6 HOURS PRN
Qty: 28 TABLET | Refills: 0 | Status: SHIPPED | OUTPATIENT
Start: 2018-10-10 | End: 2018-10-29 | Stop reason: SDUPTHER

## 2018-10-10 RX ORDER — TIZANIDINE HYDROCHLORIDE 2 MG/1
2 CAPSULE, GELATIN COATED ORAL 3 TIMES DAILY PRN
Qty: 30 CAPSULE | Refills: 0 | Status: SHIPPED | OUTPATIENT
Start: 2018-10-10 | End: 2018-10-29 | Stop reason: SDUPTHER

## 2018-10-10 RX ORDER — TRIAMTERENE AND HYDROCHLOROTHIAZIDE 37.5; 25 MG/1; MG/1
TABLET ORAL
Qty: 90 TABLET | Refills: 3 | Status: SHIPPED | OUTPATIENT
Start: 2018-10-10 | End: 2018-12-05

## 2018-10-10 ASSESSMENT — PATIENT HEALTH QUESTIONNAIRE - PHQ9
SUM OF ALL RESPONSES TO PHQ QUESTIONS 1-9: 0
1. LITTLE INTEREST OR PLEASURE IN DOING THINGS: 0
SUM OF ALL RESPONSES TO PHQ9 QUESTIONS 1 & 2: 0
2. FEELING DOWN, DEPRESSED OR HOPELESS: 0
SUM OF ALL RESPONSES TO PHQ QUESTIONS 1-9: 0

## 2018-10-14 ASSESSMENT — ENCOUNTER SYMPTOMS
ABDOMINAL PAIN: 0
COUGH: 0
CONSTIPATION: 0
CHEST TIGHTNESS: 0
BLOOD IN STOOL: 0
SINUS PRESSURE: 0
WHEEZING: 0
DIARRHEA: 0
VOMITING: 0
SHORTNESS OF BREATH: 0
RHINORRHEA: 0
BACK PAIN: 1
SORE THROAT: 0
NAUSEA: 0
ABDOMINAL DISTENTION: 0

## 2018-10-16 ENCOUNTER — TELEPHONE (OUTPATIENT)
Dept: FAMILY MEDICINE CLINIC | Age: 83
End: 2018-10-16

## 2018-10-16 DIAGNOSIS — M54.5 MIDLINE LOW BACK PAIN, UNSPECIFIED CHRONICITY, WITH SCIATICA PRESENCE UNSPECIFIED: Primary | ICD-10-CM

## 2018-10-16 NOTE — TELEPHONE ENCOUNTER
Nicole called stating Pt is still having a lot of back pain near the back of his ribs and meds are not helping. Nicole states they called insurance company and they stated Pt may need an MRI completed. Please advise.

## 2018-10-17 ENCOUNTER — TELEPHONE (OUTPATIENT)
Dept: SLEEP CENTER | Age: 83
End: 2018-10-17

## 2018-10-24 ENCOUNTER — TELEPHONE (OUTPATIENT)
Dept: FAMILY MEDICINE CLINIC | Age: 83
End: 2018-10-24

## 2018-10-24 ENCOUNTER — HOSPITAL ENCOUNTER (OUTPATIENT)
Dept: MRI IMAGING | Age: 83
Discharge: HOME | End: 2018-10-24
Payer: MEDICARE

## 2018-10-24 DIAGNOSIS — M54.5 MIDLINE LOW BACK PAIN, UNSPECIFIED CHRONICITY, WITH SCIATICA PRESENCE UNSPECIFIED: ICD-10-CM

## 2018-10-24 PROCEDURE — 72146 MRI CHEST SPINE W/O DYE: CPT

## 2018-10-24 NOTE — TELEPHONE ENCOUNTER
[facility] [place] with [address] [facility], Dr Gennaro Diaz (not sure of spelling) would like to speak with Dr Sukhi Shelton regarding Critical Results on this patient.     [address] [facility] phone# [phone]

## 2018-10-26 NOTE — TELEPHONE ENCOUNTER
Spoke with pt's wife Alan Miranda (confirmed on hipaa) pt was driving; an appt was made for Monday to discuss MRI

## 2018-10-29 ENCOUNTER — OFFICE VISIT (OUTPATIENT)
Dept: PULMONOLOGY | Age: 83
End: 2018-10-29
Payer: MEDICARE

## 2018-10-29 ENCOUNTER — OFFICE VISIT (OUTPATIENT)
Dept: FAMILY MEDICINE CLINIC | Age: 83
End: 2018-10-29
Payer: MEDICARE

## 2018-10-29 VITALS
BODY MASS INDEX: 31.87 KG/M2 | OXYGEN SATURATION: 96 % | RESPIRATION RATE: 20 BRPM | DIASTOLIC BLOOD PRESSURE: 60 MMHG | HEART RATE: 74 BPM | WEIGHT: 188.6 LBS | SYSTOLIC BLOOD PRESSURE: 112 MMHG

## 2018-10-29 VITALS
WEIGHT: 188 LBS | OXYGEN SATURATION: 93 % | BODY MASS INDEX: 32.1 KG/M2 | HEART RATE: 77 BPM | SYSTOLIC BLOOD PRESSURE: 128 MMHG | DIASTOLIC BLOOD PRESSURE: 64 MMHG | HEIGHT: 64 IN

## 2018-10-29 DIAGNOSIS — G47.33 OBSTRUCTIVE SLEEP APNEA: Primary | ICD-10-CM

## 2018-10-29 DIAGNOSIS — Z23 NEED FOR PROPHYLACTIC VACCINATION AGAINST STREPTOCOCCUS PNEUMONIAE (PNEUMOCOCCUS): ICD-10-CM

## 2018-10-29 DIAGNOSIS — M54.6 ACUTE RIGHT-SIDED THORACIC BACK PAIN: ICD-10-CM

## 2018-10-29 DIAGNOSIS — S22.070D CLOSED WEDGE COMPRESSION FRACTURE OF NINTH THORACIC VERTEBRA WITH ROUTINE HEALING, SUBSEQUENT ENCOUNTER: Primary | ICD-10-CM

## 2018-10-29 DIAGNOSIS — R06.02 SHORTNESS OF BREATH: ICD-10-CM

## 2018-10-29 PROCEDURE — G8482 FLU IMMUNIZE ORDER/ADMIN: HCPCS | Performed by: FAMILY MEDICINE

## 2018-10-29 PROCEDURE — 1101F PT FALLS ASSESS-DOCD LE1/YR: CPT | Performed by: INTERNAL MEDICINE

## 2018-10-29 PROCEDURE — 1101F PT FALLS ASSESS-DOCD LE1/YR: CPT | Performed by: FAMILY MEDICINE

## 2018-10-29 PROCEDURE — 1123F ACP DISCUSS/DSCN MKR DOCD: CPT | Performed by: FAMILY MEDICINE

## 2018-10-29 PROCEDURE — 99213 OFFICE O/P EST LOW 20 MIN: CPT | Performed by: INTERNAL MEDICINE

## 2018-10-29 PROCEDURE — 4040F PNEUMOC VAC/ADMIN/RCVD: CPT | Performed by: INTERNAL MEDICINE

## 2018-10-29 PROCEDURE — G8427 DOCREV CUR MEDS BY ELIG CLIN: HCPCS | Performed by: INTERNAL MEDICINE

## 2018-10-29 PROCEDURE — 99213 OFFICE O/P EST LOW 20 MIN: CPT | Performed by: FAMILY MEDICINE

## 2018-10-29 PROCEDURE — 4040F PNEUMOC VAC/ADMIN/RCVD: CPT | Performed by: FAMILY MEDICINE

## 2018-10-29 PROCEDURE — 1036F TOBACCO NON-USER: CPT | Performed by: INTERNAL MEDICINE

## 2018-10-29 PROCEDURE — G8598 ASA/ANTIPLAT THER USED: HCPCS | Performed by: INTERNAL MEDICINE

## 2018-10-29 PROCEDURE — G0009 ADMIN PNEUMOCOCCAL VACCINE: HCPCS | Performed by: FAMILY MEDICINE

## 2018-10-29 PROCEDURE — 90732 PPSV23 VACC 2 YRS+ SUBQ/IM: CPT | Performed by: FAMILY MEDICINE

## 2018-10-29 PROCEDURE — G8417 CALC BMI ABV UP PARAM F/U: HCPCS | Performed by: INTERNAL MEDICINE

## 2018-10-29 PROCEDURE — G8427 DOCREV CUR MEDS BY ELIG CLIN: HCPCS | Performed by: FAMILY MEDICINE

## 2018-10-29 PROCEDURE — 1036F TOBACCO NON-USER: CPT | Performed by: FAMILY MEDICINE

## 2018-10-29 PROCEDURE — G8598 ASA/ANTIPLAT THER USED: HCPCS | Performed by: FAMILY MEDICINE

## 2018-10-29 PROCEDURE — G8417 CALC BMI ABV UP PARAM F/U: HCPCS | Performed by: FAMILY MEDICINE

## 2018-10-29 PROCEDURE — 1123F ACP DISCUSS/DSCN MKR DOCD: CPT | Performed by: INTERNAL MEDICINE

## 2018-10-29 PROCEDURE — G8482 FLU IMMUNIZE ORDER/ADMIN: HCPCS | Performed by: INTERNAL MEDICINE

## 2018-10-29 RX ORDER — TRAMADOL HYDROCHLORIDE 50 MG/1
50 TABLET ORAL EVERY 6 HOURS PRN
Qty: 120 TABLET | Refills: 2 | Status: SHIPPED | OUTPATIENT
Start: 2018-10-29 | End: 2019-01-27

## 2018-10-29 RX ORDER — TIZANIDINE HYDROCHLORIDE 2 MG/1
2 CAPSULE, GELATIN COATED ORAL 3 TIMES DAILY PRN
Qty: 30 CAPSULE | Refills: 2 | Status: SHIPPED | OUTPATIENT
Start: 2018-10-29 | End: 2020-01-07 | Stop reason: ALTCHOICE

## 2018-10-29 RX ORDER — SERTRALINE HYDROCHLORIDE 100 MG/1
100 TABLET, FILM COATED ORAL DAILY
Qty: 90 TABLET | Refills: 3 | Status: SHIPPED | OUTPATIENT
Start: 2018-10-29 | End: 2019-01-14 | Stop reason: SDUPTHER

## 2018-10-29 ASSESSMENT — ENCOUNTER SYMPTOMS
RHINORRHEA: 0
BLOOD IN STOOL: 0
WHEEZING: 0
ABDOMINAL DISTENTION: 0
SHORTNESS OF BREATH: 0
VOMITING: 0
ABDOMINAL PAIN: 0
BACK PAIN: 1
COUGH: 0
CONSTIPATION: 0
CHEST TIGHTNESS: 0
NAUSEA: 0
SINUS PRESSURE: 0
SORE THROAT: 0
DIARRHEA: 0

## 2018-10-30 ENCOUNTER — TELEPHONE (OUTPATIENT)
Dept: FAMILY MEDICINE CLINIC | Age: 83
End: 2018-10-30

## 2018-11-01 ENCOUNTER — HOSPITAL ENCOUNTER (OUTPATIENT)
Dept: SLEEP CENTER | Age: 83
Discharge: HOME | End: 2018-11-01
Payer: MEDICARE

## 2018-11-01 PROCEDURE — 95810 POLYSOM 6/> YRS 4/> PARAM: CPT | Performed by: INTERNAL MEDICINE

## 2018-11-01 PROCEDURE — 95810 POLYSOM 6/> YRS 4/> PARAM: CPT

## 2018-11-01 NOTE — TELEPHONE ENCOUNTER
Spoke with Nicole and relayed PCP's message/instruction. Nicole verbalized understanding and thanks. No further action is needed, at this time.

## 2018-11-13 ENCOUNTER — OFFICE VISIT (OUTPATIENT)
Dept: PULMONOLOGY | Age: 83
End: 2018-11-13
Payer: MEDICARE

## 2018-11-13 VITALS
HEART RATE: 82 BPM | SYSTOLIC BLOOD PRESSURE: 128 MMHG | BODY MASS INDEX: 30.9 KG/M2 | DIASTOLIC BLOOD PRESSURE: 68 MMHG | WEIGHT: 180 LBS | OXYGEN SATURATION: 97 % | RESPIRATION RATE: 14 BRPM

## 2018-11-13 DIAGNOSIS — I50.22 CHRONIC SYSTOLIC CHF (CONGESTIVE HEART FAILURE) (HCC): ICD-10-CM

## 2018-11-13 DIAGNOSIS — I51.9 DIASTOLIC DYSFUNCTION, LEFT VENTRICLE: ICD-10-CM

## 2018-11-13 DIAGNOSIS — G47.33 OSA (OBSTRUCTIVE SLEEP APNEA): Primary | ICD-10-CM

## 2018-11-13 DIAGNOSIS — R06.09 DYSPNEA ON EXERTION: ICD-10-CM

## 2018-11-13 PROCEDURE — G8417 CALC BMI ABV UP PARAM F/U: HCPCS | Performed by: INTERNAL MEDICINE

## 2018-11-13 PROCEDURE — 99213 OFFICE O/P EST LOW 20 MIN: CPT | Performed by: INTERNAL MEDICINE

## 2018-11-13 PROCEDURE — 1036F TOBACCO NON-USER: CPT | Performed by: INTERNAL MEDICINE

## 2018-11-13 PROCEDURE — 4040F PNEUMOC VAC/ADMIN/RCVD: CPT | Performed by: INTERNAL MEDICINE

## 2018-11-13 PROCEDURE — G8598 ASA/ANTIPLAT THER USED: HCPCS | Performed by: INTERNAL MEDICINE

## 2018-11-13 PROCEDURE — 1101F PT FALLS ASSESS-DOCD LE1/YR: CPT | Performed by: INTERNAL MEDICINE

## 2018-11-13 PROCEDURE — 1123F ACP DISCUSS/DSCN MKR DOCD: CPT | Performed by: INTERNAL MEDICINE

## 2018-11-13 PROCEDURE — G8482 FLU IMMUNIZE ORDER/ADMIN: HCPCS | Performed by: INTERNAL MEDICINE

## 2018-11-13 PROCEDURE — G8427 DOCREV CUR MEDS BY ELIG CLIN: HCPCS | Performed by: INTERNAL MEDICINE

## 2018-11-14 ENCOUNTER — OFFICE VISIT (OUTPATIENT)
Dept: FAMILY MEDICINE CLINIC | Age: 83
End: 2018-11-14
Payer: MEDICARE

## 2018-11-14 VITALS
HEART RATE: 70 BPM | HEIGHT: 64 IN | WEIGHT: 183.4 LBS | SYSTOLIC BLOOD PRESSURE: 118 MMHG | BODY MASS INDEX: 31.31 KG/M2 | RESPIRATION RATE: 16 BRPM | OXYGEN SATURATION: 96 % | DIASTOLIC BLOOD PRESSURE: 60 MMHG

## 2018-11-14 DIAGNOSIS — K92.1 BLACK STOOLS: ICD-10-CM

## 2018-11-14 DIAGNOSIS — Z23 NEED FOR PROPHYLACTIC VACCINATION AGAINST DIPHTHERIA-TETANUS-PERTUSSIS (DTP): ICD-10-CM

## 2018-11-14 DIAGNOSIS — Z00.00 ROUTINE GENERAL MEDICAL EXAMINATION AT A HEALTH CARE FACILITY: Primary | ICD-10-CM

## 2018-11-14 PROCEDURE — G8482 FLU IMMUNIZE ORDER/ADMIN: HCPCS | Performed by: FAMILY MEDICINE

## 2018-11-14 PROCEDURE — G0438 PPPS, INITIAL VISIT: HCPCS | Performed by: FAMILY MEDICINE

## 2018-11-14 PROCEDURE — G8598 ASA/ANTIPLAT THER USED: HCPCS | Performed by: FAMILY MEDICINE

## 2018-11-14 PROCEDURE — 4040F PNEUMOC VAC/ADMIN/RCVD: CPT | Performed by: FAMILY MEDICINE

## 2018-11-14 ASSESSMENT — LIFESTYLE VARIABLES: HOW OFTEN DO YOU HAVE A DRINK CONTAINING ALCOHOL: 0

## 2018-11-14 ASSESSMENT — ANXIETY QUESTIONNAIRES: GAD7 TOTAL SCORE: 1

## 2018-11-14 ASSESSMENT — PATIENT HEALTH QUESTIONNAIRE - PHQ9
SUM OF ALL RESPONSES TO PHQ QUESTIONS 1-9: 1
SUM OF ALL RESPONSES TO PHQ QUESTIONS 1-9: 1

## 2018-11-14 NOTE — PROGRESS NOTES
Extraction    EYE SURGERY Left 11/21/2014    Cataract Extraction    HERNIA REPAIR      Umbilical    TONSILLECTOMY Bilateral      Family History   Problem Relation Age of Onset    Heart Disease Father        CareTeam (Including outside providers/suppliers regularly involved in providing care):   Patient Care Team:  Damian Samson MD as PCP - General    Wt Readings from Last 3 Encounters:   11/14/18 183 lb 6.4 oz (83.2 kg)   11/13/18 180 lb (81.6 kg)   10/29/18 188 lb (85.3 kg)     Vitals:    11/14/18 1017   BP: 118/60   Pulse: 70   Resp: 16   SpO2: 96%   Weight: 183 lb 6.4 oz (83.2 kg)   Height: 5' 4\" (1.626 m)     Body mass index is 31.48 kg/m². General Appearance: alert and oriented to person, place and time, well developed and well- nourished, in no acute distress  Skin: warm and dry, no rash or erythema  Head: normocephalic and atraumatic  Eyes: pupils equal, round, and reactive to light, extraocular eye movements intact, conjunctivae normal  ENT: tympanic membrane, external ear and ear canal normal bilaterally, nose without deformity, nasal mucosa and turbinates normal without polyps  Neck: supple and non-tender without mass, no thyromegaly or thyroid nodules, no cervical lymphadenopathy  Pulmonary/Chest: clear to auscultation bilaterally- no wheezes, rales or rhonchi, normal air movement, no respiratory distress  Cardiovascular: normal rate, regular rhythm, normal S1 and S2, no murmurs, rubs, clicks, or gallops, distal pulses intact, no carotid bruits  Abdomen: soft, non-tender, non-distended, normal bowel sounds, no masses or organomegaly  Extremities: no cyanosis, clubbing or edema  Musculoskeletal: normal range of motion, no joint swelling, deformity or tenderness  Neurologic: reflexes normal and symmetric, no cranial nerve deficit, gait, coordination and speech normal    Patient's complete Health Risk Assessment and screening values have been reviewed and are found in Flowsheets.  The following

## 2018-11-14 NOTE — PATIENT INSTRUCTIONS
Personalized Preventive Plan for Susanna Duarte - 11/14/2018  Medicare offers a range of preventive health benefits. Some of the tests and screenings are paid in full while other may be subject to a deductible, co-insurance, and/or copay. Some of these benefits include a comprehensive review of your medical history including lifestyle, illnesses that may run in your family, and various assessments and screenings as appropriate. After reviewing your medical record and screening and assessments performed today your provider may have ordered immunizations, labs, imaging, and/or referrals for you. A list of these orders (if applicable) as well as your Preventive Care list are included within your After Visit Summary for your review. Other Preventive Recommendations:    · A preventive eye exam performed by an eye specialist is recommended every 1-2 years to screen for glaucoma; cataracts, macular degeneration, and other eye disorders. · A preventive dental visit is recommended every 6 months. · Try to get at least 150 minutes of exercise per week or 10,000 steps per day on a pedometer . · Order or download the FREE \"Exercise & Physical Activity: Your Everyday Guide\" from [employer]. Call 9-750.616.7014 or search [employer] online. · You need 3929-2034 mg of calcium and 0099-9108 IU of vitamin D per day. It is possible to meet your calcium requirement with diet alone, but a vitamin D supplement is usually necessary to meet this goal.  · When exposed to the sun, use a sunscreen that protects against both UVA and UVB radiation with an SPF of 30 or greater. Reapply every 2 to 3 hours or after sweating, drying off with a towel, or swimming. · Always wear a seat belt when traveling in a car. Always wear a helmet when riding a bicycle or motorcycle.     Keeping Home a [facility]       As we get older, changes in balance, gait, strength, vision, hearing, and cognition make voice-response system. You should also make arrangements to stay in contact with someonefriend, neighbor, family memberon a regular schedule. Fire Prevention   According to the [employer]. (Smoke Alarms for Every) [facility], senior citizens are one of the two highest risk groups for death and serious injuries due to residential fires. When cooking, wear short-sleeved items, never a bulky long-sleeved robe. The [facility]'s Safety Checklist for Older Consumers emphasizes the importance of checking basements, garages, workshops and storage areas for fire hazards, such as volatile liquids, piles of old rags or clothing and overloaded circuits. Never smoke in bed or when lying down on a couch or recliner chair. Small portable electric or kerosene heaters are responsible for many home fires and should be used cautiously if at all. If you do use one, be sure to keep them away from flammable materials. In case of fire, make sure you have a pre-established emergency exit plan. Have a professional check your fireplace and other fuel-burning appliances yearly. [employer]   Baby boomers entering the gunter years will continue to see the development of new products to help older adults live safely and independently in spite of age-related changes. Making Life More Livable  , by [employer], lists over 1,000 products for \"living well in the mature years,\" such as bathing and mobility aids, household security devices, ergonomically designed knives and peelers, and faucet valves and knobs for temperature control. Medical supply stores and organizations are good sources of information about products that improve your quality of life and insure your safety.      Last Reviewed: November 2009 Sweetie Irwin MD   Updated: 3/7/2011     ·

## 2018-12-04 ENCOUNTER — HOSPITAL ENCOUNTER (OUTPATIENT)
Dept: SLEEP CENTER | Age: 83
Discharge: HOME | End: 2018-12-04
Payer: MEDICARE

## 2018-12-04 DIAGNOSIS — G47.33 OSA (OBSTRUCTIVE SLEEP APNEA): ICD-10-CM

## 2018-12-04 DIAGNOSIS — R06.09 DYSPNEA ON EXERTION: ICD-10-CM

## 2018-12-04 DIAGNOSIS — I51.9 DIASTOLIC DYSFUNCTION, LEFT VENTRICLE: ICD-10-CM

## 2018-12-04 PROCEDURE — 95811 POLYSOM 6/>YRS CPAP 4/> PARM: CPT | Performed by: INTERNAL MEDICINE

## 2018-12-04 PROCEDURE — 95811 POLYSOM 6/>YRS CPAP 4/> PARM: CPT

## 2018-12-05 ENCOUNTER — OFFICE VISIT (OUTPATIENT)
Dept: CARDIOLOGY CLINIC | Age: 83
End: 2018-12-05
Payer: MEDICARE

## 2018-12-05 VITALS
HEART RATE: 68 BPM | BODY MASS INDEX: 31.16 KG/M2 | RESPIRATION RATE: 18 BRPM | DIASTOLIC BLOOD PRESSURE: 74 MMHG | SYSTOLIC BLOOD PRESSURE: 136 MMHG | HEIGHT: 65 IN | WEIGHT: 187 LBS

## 2018-12-05 DIAGNOSIS — R07.9 CHEST PAIN AT REST: ICD-10-CM

## 2018-12-05 DIAGNOSIS — I25.10 CORONARY ARTERY DISEASE INVOLVING NATIVE CORONARY ARTERY OF NATIVE HEART WITHOUT ANGINA PECTORIS: Primary | ICD-10-CM

## 2018-12-05 PROCEDURE — G8482 FLU IMMUNIZE ORDER/ADMIN: HCPCS | Performed by: INTERNAL MEDICINE

## 2018-12-05 PROCEDURE — G8598 ASA/ANTIPLAT THER USED: HCPCS | Performed by: INTERNAL MEDICINE

## 2018-12-05 PROCEDURE — 1036F TOBACCO NON-USER: CPT | Performed by: INTERNAL MEDICINE

## 2018-12-05 PROCEDURE — 1101F PT FALLS ASSESS-DOCD LE1/YR: CPT | Performed by: INTERNAL MEDICINE

## 2018-12-05 PROCEDURE — 1123F ACP DISCUSS/DSCN MKR DOCD: CPT | Performed by: INTERNAL MEDICINE

## 2018-12-05 PROCEDURE — G8427 DOCREV CUR MEDS BY ELIG CLIN: HCPCS | Performed by: INTERNAL MEDICINE

## 2018-12-05 PROCEDURE — G8417 CALC BMI ABV UP PARAM F/U: HCPCS | Performed by: INTERNAL MEDICINE

## 2018-12-05 PROCEDURE — 4040F PNEUMOC VAC/ADMIN/RCVD: CPT | Performed by: INTERNAL MEDICINE

## 2018-12-05 PROCEDURE — 99214 OFFICE O/P EST MOD 30 MIN: CPT | Performed by: INTERNAL MEDICINE

## 2018-12-05 RX ORDER — NITROGLYCERIN 0.4 MG/1
0.4 TABLET SUBLINGUAL EVERY 5 MIN PRN
Qty: 25 TABLET | Refills: 3 | Status: SHIPPED | OUTPATIENT
Start: 2018-12-05 | End: 2020-04-15

## 2018-12-05 RX ORDER — RANOLAZINE 1000 MG/1
1000 TABLET, EXTENDED RELEASE ORAL 2 TIMES DAILY
Qty: 56 TABLET | Refills: 0 | COMMUNITY
Start: 2018-12-05 | End: 2019-01-03 | Stop reason: SDUPTHER

## 2018-12-05 NOTE — PROGRESS NOTES
Tam De Paz MD        OFFICE  FOLLOWUP NOTE    Chief complaints: patient is here for management of CAD cabg , HTN, AFIB, DYSLPIDEMIA, he had GI bleeding, sleep apnea    Subjective: patient feels better, no chest pain, no shortness of breath, no dizziness, no palpitations    HPI Tuyet Austin is a 80 y. o.year old who  has a past medical history of [facility] ([facility] CAD (coronary artery disease); CH[facility] (congestive heart failure) ([facility] Chronic back pain; Excessive daytime sleepiness; H/O cardiovascular stress test; H/O diagnostic tests; H/O Doppler ultrasound; H/O echocardiogram; History of echocardiogram; History of nuclear stress test; Hx of echocardiogram; Hyperlipidemia; Hypertension; and Obstructive sleep apnea. and presents for management of CAD cabg , HTN, AFIB, DYSLPIDEMIA, he had GI bleeding which are well controlled, he had abnormal sleep study today, he will need CPAP      Current Outpatient Prescriptions   Medication Sig Dispense Refill    sertraline (ZOLOFT) 100 MG tablet Take 1 tablet by mouth daily 90 tablet 3    tiZANidine (ZANAFLEX) 2 MG capsule Take 1 capsule by mouth 3 times daily as needed for Muscle spasms 30 capsule 2    traMADol (ULTRAM) 50 MG tablet Take 1 tablet by mouth every 6 hours as needed for Pain for up to 90 days. . 120 tablet 2    lisinopril (PRINIVIL;ZESTRIL) 2.5 MG tablet TAKE 1 TABLET DAILY 90 tablet 3    clopidogrel (PLAVIX) 75 MG tablet TAKE 1 TABLET DAILY 30 tablet 11    ezetimibe-simvastatin (VYTORIN) 10-20 MG per tablet TAKE 1 TABLET NIGHTLY 90 tablet 3    nitroGLYCERIN (NITROSTAT) 0.4 MG SL tablet PLACE 1 TABLET UNDER THE TONGUE EVERY 5 MINUTES AS NEEDED FOR CHEST PAIN 25 tablet 2    metoprolol tartrate (LOPRESSOR) 25 MG tablet Take 1 tablet by mouth 2 times daily (Patient taking differently: Take 12.5 mg by mouth 2 times daily ) 180 tablet 3    ranolazine (RANEXA) 1000 MG extended release tablet Take 1 tablet by mouth 2 times daily 180 tablet 3 palate are normal, oral mucosa is normal without any notation of pallor or cyanosis  Neck - Supple. No jugular venous distention noted. No carotid bruits, no apical -carotid delay  Respiratory:  Normal breath sounds, No respiratory distress, No wheezing, No chest tenderness. ,no use of accessory muscles, diaphragm movement is normal  Cardiovascular: (PMI) apex non displaced,no lifts no thrills, no s3,no s4, Normal heart rate, Normal rhythm, No murmurs, No rubs, No gallops. Carotid arteries pulse and amplitude are normal no bruit, no abdominal bruit noted ( normal abdominal aorta ausculation), femoral arteries pulse and amplitude are normal no bruit, pedal pulses are normal  Femoral pulses have normal amplitude, no bruits   Extremities - No cyanosis, clubbing, or significant edema, no varicose veins    Abdomen - No masses, tenderness, or organomegaly, no hepato-splenomegally, no bruits  Musculoskeletal - No significant edema, no kyphosis or scoliosis, no deformity in any extremity noted, muscle strength and tone are normal  Skin: no ulcer,no scar,no stasis dermatitis   Neurologic - alert oriented times 3,Cranial nerves II through XII are grossly intact. There were no gross focal neurologic abnormalities. All sensory and motor nerves examined and were normal  Psychiatric: normal mood and affect    No results found for: CKTOTAL, CKMB, CKMBINDEX, TROPONINI  BNP:  No results found for: BNP  PT/INR:  No results found for: PTINR  Lab Results   Component Value Date    LABA1C 6.1 06/08/2014    LABA1C 5.8 06/03/2014     Lab Results   Component Value Date    CHOL 127 05/18/2015    TRIG 98 05/18/2015    HDL 55 05/18/2015    LDLCALC 52 05/18/2015     Lab Results   Component Value Date    ALT 13 05/29/2018    AST 19 05/29/2018     TSH:    Lab Results   Component Value Date    TSH 2.63 12/01/2016       Impression:  Siri Torres is a 80 y. o.year old who  has a past medical history of Atrial fibrillation ([facility] CAD (coronary artery

## 2018-12-05 NOTE — PROGRESS NOTES
[id number] Score for Atrial Fibrillation Stroke Risk   Risk   Factors  Component Value   C CHF Yes 1   H HTN Yes 1   A2 Age >= 76 Yes,  (80 y.o.) 2   D DM No 0   S2 Prior Stroke/TIA Yes 2   V Vascular Disease No 0   A Age 74-69 No,  (80 y.o.) 0   Sc Sex male 0    [id number]  Score  6   Score last updated 29/8/91 0:91 PM    Click here for a link to the UpToDate guideline \"Atrial Fibrillation: Anticoagulation therapy to prevent embolization    Disclaimer: Risk Score calculation is dependent on accuracy of patient problem list and past encounter diagnosis.

## 2018-12-26 ENCOUNTER — TELEPHONE (OUTPATIENT)
Dept: SLEEP CENTER | Age: 83
End: 2018-12-26

## 2019-01-03 DIAGNOSIS — R07.9 CHEST PAIN AT REST: ICD-10-CM

## 2019-01-03 RX ORDER — RANOLAZINE 1000 MG/1
1000 TABLET, EXTENDED RELEASE ORAL 2 TIMES DAILY
Qty: 180 TABLET | Refills: 3 | Status: SHIPPED | OUTPATIENT
Start: 2019-01-03 | End: 2019-11-26 | Stop reason: SDUPTHER

## 2019-01-15 RX ORDER — SERTRALINE HYDROCHLORIDE 100 MG/1
100 TABLET, FILM COATED ORAL DAILY
Qty: 90 TABLET | Refills: 3 | Status: SHIPPED | OUTPATIENT
Start: 2019-01-15 | End: 2020-01-31

## 2019-03-14 ENCOUNTER — HOSPITAL ENCOUNTER (OUTPATIENT)
Dept: GENERAL RADIOLOGY | Age: 84
Discharge: HOME | End: 2019-03-14
Payer: MEDICARE

## 2019-03-14 ENCOUNTER — HOSPITAL ENCOUNTER (OUTPATIENT)
Age: 84
Discharge: HOME | End: 2019-03-14
Payer: MEDICARE

## 2019-03-14 ENCOUNTER — OFFICE VISIT (OUTPATIENT)
Dept: FAMILY MEDICINE CLINIC | Age: 84
End: 2019-03-14
Payer: MEDICARE

## 2019-03-14 VITALS
OXYGEN SATURATION: 92 % | SYSTOLIC BLOOD PRESSURE: 118 MMHG | HEART RATE: 72 BPM | BODY MASS INDEX: 31.76 KG/M2 | HEIGHT: 64 IN | RESPIRATION RATE: 16 BRPM | WEIGHT: 186 LBS | DIASTOLIC BLOOD PRESSURE: 66 MMHG
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
## 2019-03-14 DIAGNOSIS — N18.31 CHRONIC KIDNEY DISEASE (CKD) STAGE G3A/A1, MODERATELY DECREASED GLOMERULAR FILTRATION RATE (GFR) BETWEEN 45-59 ML/MIN/1.73 SQUARE METER AND ALBUMINURIA CREATININE RATIO LESS THAN 30 MG/G (HCC): ICD-10-CM

## 2019-03-14 DIAGNOSIS — N18.30 CKD (CHRONIC KIDNEY DISEASE), STAGE III (HCC): ICD-10-CM
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
## 2019-03-14 DIAGNOSIS — I10 ESSENTIAL HYPERTENSION: ICD-10-CM

## 2019-03-14 DIAGNOSIS — I50.22 CHRONIC SYSTOLIC CHF (CONGESTIVE HEART FAILURE) (HCC): ICD-10-CM
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
## 2019-03-14 DIAGNOSIS — S29.9XXA CHEST INJURY, INITIAL ENCOUNTER: ICD-10-CM

## 2019-03-14 DIAGNOSIS — R42 DIZZINESS: ICD-10-CM

## 2019-03-14 DIAGNOSIS — R07.89 CHEST WALL PAIN: ICD-10-CM

## 2019-03-14 DIAGNOSIS — R07.89 CHEST WALL PAIN: Primary | ICD-10-CM

## 2019-03-14 DIAGNOSIS — R05.9 COUGH: ICD-10-CM

## 2019-03-14 DIAGNOSIS — S29.9XXA: ICD-10-CM

## 2019-03-14 DIAGNOSIS — J40 BRONCHITIS: ICD-10-CM

## 2019-03-14 DIAGNOSIS — Z79.01 CHRONIC ANTICOAGULATION: ICD-10-CM

## 2019-03-14 DIAGNOSIS — R07.89 OTHER CHEST PAIN: ICD-10-CM

## 2019-03-14 DIAGNOSIS — Z91.81 AT HIGH RISK FOR FALLS: ICD-10-CM

## 2019-03-14 LAB
A/G RATIO: 1.5 (ref 1.1–2.2)
ALBUMIN SERPL-MCNC: 4.3 G/DL (ref 3.4–5)
ALP BLD-CCNC: 62 U/L (ref 40–129)
ALT SERPL-CCNC: 13 U/L (ref 10–40)
ANION GAP SERPL CALCULATED.3IONS-SCNC: 16 MMOL/L (ref 3–16)
AST SERPL-CCNC: 19 U/L (ref 15–37)
BASOPHILS ABSOLUTE: 0.2 K/UL (ref 0–0.2)
BASOPHILS RELATIVE PERCENT: 2 %
BILIRUB SERPL-MCNC: 0.5 MG/DL (ref 0–1)
BUN BLDV-MCNC: 37 MG/DL (ref 7–20)
CALCIUM SERPL-MCNC: 9.2 MG/DL (ref 8.3–10.6)
CHLORIDE BLD-SCNC: 99 MMOL/L (ref 99–110)
CHOLESTEROL, TOTAL: 114 MG/DL (ref 0–199)
CO2: 24 MMOL/L (ref 21–32)
CREAT SERPL-MCNC: 1.7 MG/DL (ref 0.8–1.3)
EOSINOPHILS ABSOLUTE: 0.1 K/UL (ref 0–0.6)
EOSINOPHILS RELATIVE PERCENT: 1 %
GFR AFRICAN AMERICAN: 47
GFR NON-AFRICAN AMERICAN: 39
GLOBULIN: 2.8 G/DL
GLUCOSE BLD-MCNC: 83 MG/DL (ref 70–99)
HCT VFR BLD CALC: 36.3 % (ref 40.5–52.5)
HDLC SERPL-MCNC: 56 MG/DL (ref 40–60)
HEMOGLOBIN: 12.2 G/DL (ref 13.5–17.5)
LDL CHOLESTEROL CALCULATED: 45 MG/DL
LYMPHOCYTES ABSOLUTE: 2.1 K/UL (ref 1–5.1)
LYMPHOCYTES RELATIVE PERCENT: 20 %
MCH RBC QN AUTO: 34.9 PG (ref 26–34)
MCHC RBC AUTO-ENTMCNC: 33.6 G/DL (ref 31–36)
MCV RBC AUTO: 103.7 FL (ref 80–100)
MONOCYTES ABSOLUTE: 0.7 K/UL (ref 0–1.3)
MONOCYTES RELATIVE PERCENT: 7 %
NEUTROPHILS ABSOLUTE: 7.2 K/UL (ref 1.7–7.7)
NEUTROPHILS RELATIVE PERCENT: 70 %
OVALOCYTES: ABNORMAL
PDW BLD-RTO: 15.2 % (ref 12.4–15.4)
PLATELET # BLD: 205 K/UL (ref 135–450)
PMV BLD AUTO: 8.2 FL (ref 5–10.5)
POTASSIUM SERPL-SCNC: 4.8 MMOL/L (ref 3.5–5.1)
RBC # BLD: 3.5 M/UL (ref 4.2–5.9)
SLIDE REVIEW: ABNORMAL
SODIUM BLD-SCNC: 139 MMOL/L (ref 136–145)
TOTAL PROTEIN: 7.1 G/DL (ref 6.4–8.2)
TRIGL SERPL-MCNC: 63 MG/DL (ref 0–150)
VLDLC SERPL CALC-MCNC: 13 MG/DL
WBC # BLD: 10.3 K/UL (ref 4–11)

## 2019-03-14 PROCEDURE — 99214 OFFICE O/P EST MOD 30 MIN: CPT | Performed by: FAMILY MEDICINE

## 2019-03-14 PROCEDURE — 1036F TOBACCO NON-USER: CPT | Performed by: FAMILY MEDICINE

## 2019-03-14 PROCEDURE — 71101 X-RAY EXAM UNILAT RIBS/CHEST: CPT

## 2019-03-14 PROCEDURE — G8417 CALC BMI ABV UP PARAM F/U: HCPCS | Performed by: FAMILY MEDICINE

## 2019-03-14 PROCEDURE — 1123F ACP DISCUSS/DSCN MKR DOCD: CPT | Performed by: FAMILY MEDICINE

## 2019-03-14 PROCEDURE — G8598 ASA/ANTIPLAT THER USED: HCPCS | Performed by: FAMILY MEDICINE

## 2019-03-14 PROCEDURE — G8427 DOCREV CUR MEDS BY ELIG CLIN: HCPCS | Performed by: FAMILY MEDICINE

## 2019-03-14 PROCEDURE — 96372 THER/PROPH/DIAG INJ SC/IM: CPT | Performed by: FAMILY MEDICINE

## 2019-03-14 PROCEDURE — G8482 FLU IMMUNIZE ORDER/ADMIN: HCPCS | Performed by: FAMILY MEDICINE

## 2019-03-14 PROCEDURE — 4040F PNEUMOC VAC/ADMIN/RCVD: CPT | Performed by: FAMILY MEDICINE

## 2019-03-14 PROCEDURE — 1101F PT FALLS ASSESS-DOCD LE1/YR: CPT | Performed by: FAMILY MEDICINE

## 2019-03-14 RX ORDER — DEXAMETHASONE SODIUM PHOSPHATE 10 MG/ML
10 INJECTION INTRAMUSCULAR; INTRAVENOUS ONCE
Status: COMPLETED | OUTPATIENT
Start: 2019-03-14 | End: 2019-03-14

## 2019-03-14 RX ORDER — TRAMADOL HYDROCHLORIDE 50 MG/1
50 TABLET ORAL EVERY 6 HOURS PRN
COMMUNITY
End: 2019-03-14

## 2019-03-14 RX ORDER — AZITHROMYCIN 250 MG/1
250 TABLET, FILM COATED ORAL SEE ADMIN INSTRUCTIONS
Qty: 6 TABLET | Refills: 0 | Status: SHIPPED | OUTPATIENT
Start: 2019-03-14 | End: 2019-03-19

## 2019-03-14 RX ORDER — GUAIFENESIN AND CODEINE PHOSPHATE 100; 10 MG/5ML; MG/5ML
5 SOLUTION ORAL EVERY 4 HOURS PRN
Qty: 118 ML | Refills: 0 | Status: SHIPPED | OUTPATIENT
Start: 2019-03-14 | End: 2019-03-21

## 2019-03-14 RX ORDER — TRAMADOL HYDROCHLORIDE 50 MG/1
50 TABLET ORAL EVERY 4 HOURS PRN
Qty: 42 TABLET | Refills: 0 | Status: SHIPPED | OUTPATIENT
Start: 2019-03-14 | End: 2019-03-21

## 2019-03-14 RX ADMIN — DEXAMETHASONE SODIUM PHOSPHATE 10 MG: 10 INJECTION INTRAMUSCULAR; INTRAVENOUS at 12:48

## 2019-03-14 ASSESSMENT — PATIENT HEALTH QUESTIONNAIRE - PHQ9
SUM OF ALL RESPONSES TO PHQ9 QUESTIONS 1 & 2: 0
SUM OF ALL RESPONSES TO PHQ QUESTIONS 1-9: 0
1. LITTLE INTEREST OR PLEASURE IN DOING THINGS: 0
2. FEELING DOWN, DEPRESSED OR HOPELESS: 0
SUM OF ALL RESPONSES TO PHQ QUESTIONS 1-9: 0

## 2019-03-14 ASSESSMENT — ENCOUNTER SYMPTOMS
SINUS PRESSURE: 0
SHORTNESS OF BREATH: 0
BLOOD IN STOOL: 0
ABDOMINAL PAIN: 0
CONSTIPATION: 0
SORE THROAT: 0
DIARRHEA: 0
CHEST TIGHTNESS: 0
NAUSEA: 0
WHEEZING: 0
BACK PAIN: 1
VOMITING: 0
RHINORRHEA: 0
COUGH: 0
ABDOMINAL DISTENTION: 0

## 2019-03-18 DIAGNOSIS — D50.8 OTHER IRON DEFICIENCY ANEMIA: ICD-10-CM

## 2019-03-18 DIAGNOSIS — N28.9 DECREASED RENAL FUNCTION: ICD-10-CM

## 2019-03-18 DIAGNOSIS — I25.10 CORONARY ARTERY DISEASE INVOLVING NATIVE CORONARY ARTERY OF NATIVE HEART WITHOUT ANGINA PECTORIS: Primary | ICD-10-CM

## 2019-03-18 DIAGNOSIS — N18.31 CHRONIC KIDNEY DISEASE (CKD) STAGE G3A/A1, MODERATELY DECREASED GLOMERULAR FILTRATION RATE (GFR) BETWEEN 45-59 ML/MIN/1.73 SQUARE METER AND ALBUMINURIA CREATININE RATIO LESS THAN 30 MG/G (HCC): ICD-10-CM

## 2019-03-26 DIAGNOSIS — D50.8 OTHER IRON DEFICIENCY ANEMIA: ICD-10-CM

## 2019-03-26 DIAGNOSIS — I25.10 CORONARY ARTERY DISEASE INVOLVING NATIVE CORONARY ARTERY OF NATIVE HEART WITHOUT ANGINA PECTORIS: ICD-10-CM

## 2019-03-26 DIAGNOSIS — N28.9 DECREASED RENAL FUNCTION: ICD-10-CM

## 2019-03-26 DIAGNOSIS — N18.31 CHRONIC KIDNEY DISEASE (CKD) STAGE G3A/A1, MODERATELY DECREASED GLOMERULAR FILTRATION RATE (GFR) BETWEEN 45-59 ML/MIN/1.73 SQUARE METER AND ALBUMINURIA CREATININE RATIO LESS THAN 30 MG/G (HCC): ICD-10-CM

## 2019-03-26 LAB
ALBUMIN SERPL-MCNC: 3.9 G/DL (ref 3.4–5)
ALP BLD-CCNC: 88 U/L (ref 40–129)
ALT SERPL-CCNC: 13 U/L (ref 10–40)
ANION GAP SERPL CALCULATED.3IONS-SCNC: 12 MMOL/L (ref 3–16)
AST SERPL-CCNC: 19 U/L (ref 15–37)
BILIRUB SERPL-MCNC: 0.3 MG/DL (ref 0–1)
BILIRUBIN DIRECT: <0.2 MG/DL (ref 0–0.3)
BILIRUBIN, INDIRECT: NORMAL MG/DL (ref 0–1)
BUN BLDV-MCNC: 27 MG/DL (ref 7–20)
CALCIUM SERPL-MCNC: 9.3 MG/DL (ref 8.3–10.6)
CHLORIDE BLD-SCNC: 101 MMOL/L (ref 99–110)
CO2: 27 MMOL/L (ref 21–32)
CREAT SERPL-MCNC: 1.6 MG/DL (ref 0.8–1.3)
GFR AFRICAN AMERICAN: 50
GFR NON-AFRICAN AMERICAN: 41
GLUCOSE BLD-MCNC: 86 MG/DL (ref 70–99)
HOMOCYSTEINE: 19 UMOL/L (ref 0–10)
PHOSPHORUS: 3.3 MG/DL (ref 2.5–4.9)
POTASSIUM SERPL-SCNC: 4.6 MMOL/L (ref 3.5–5.1)
SODIUM BLD-SCNC: 140 MMOL/L (ref 136–145)
TOTAL PROTEIN: 7.1 G/DL (ref 6.4–8.2)

## 2019-03-27 LAB
FOLATE: >20 NG/ML (ref 4.78–24.2)
VITAMIN B-12: 712 PG/ML (ref 211–911)

## 2019-03-29 LAB — METHYLMALONIC ACID: 0.36 UMOL/L (ref 0–0.4)

## 2019-06-12 ENCOUNTER — OFFICE VISIT (OUTPATIENT)
Dept: CARDIOLOGY CLINIC | Age: 84
End: 2019-06-12
Payer: MEDICARE

## 2019-06-12 VITALS
SYSTOLIC BLOOD PRESSURE: 108 MMHG | BODY MASS INDEX: 31.49 KG/M2 | HEART RATE: 72 BPM | WEIGHT: 189 LBS | DIASTOLIC BLOOD PRESSURE: 58 MMHG | HEIGHT: 65 IN | RESPIRATION RATE: 18 BRPM

## 2019-06-12 DIAGNOSIS — I25.10 CORONARY ARTERY DISEASE INVOLVING NATIVE CORONARY ARTERY OF NATIVE HEART WITHOUT ANGINA PECTORIS: Primary | ICD-10-CM

## 2019-06-12 PROCEDURE — G8427 DOCREV CUR MEDS BY ELIG CLIN: HCPCS | Performed by: INTERNAL MEDICINE

## 2019-06-12 PROCEDURE — 99214 OFFICE O/P EST MOD 30 MIN: CPT | Performed by: INTERNAL MEDICINE

## 2019-06-12 PROCEDURE — 1036F TOBACCO NON-USER: CPT | Performed by: INTERNAL MEDICINE

## 2019-06-12 PROCEDURE — G8417 CALC BMI ABV UP PARAM F/U: HCPCS | Performed by: INTERNAL MEDICINE

## 2019-06-12 PROCEDURE — G8598 ASA/ANTIPLAT THER USED: HCPCS | Performed by: INTERNAL MEDICINE

## 2019-06-12 PROCEDURE — 4040F PNEUMOC VAC/ADMIN/RCVD: CPT | Performed by: INTERNAL MEDICINE

## 2019-06-12 PROCEDURE — 1123F ACP DISCUSS/DSCN MKR DOCD: CPT | Performed by: INTERNAL MEDICINE

## 2019-06-12 RX ORDER — ACETAMINOPHEN 500 MG
1000 TABLET ORAL EVERY 6 HOURS PRN
COMMUNITY

## 2019-06-12 RX ORDER — SILDENAFIL 50 MG/1
50 TABLET, FILM COATED ORAL PRN
Qty: 30 TABLET | Refills: 3 | Status: ON HOLD | OUTPATIENT
Start: 2019-06-12 | End: 2020-05-28 | Stop reason: HOSPADM

## 2019-06-12 NOTE — PROGRESS NOTES
2 MG capsule Take 1 capsule by mouth 3 times daily as needed for Muscle spasms 30 capsule 2     No current facility-administered medications for this visit. Allergies: Patient has no known allergies. Past Medical History:   Diagnosis Date    Atrial fibrillation ([address]     CAD (coronary artery disease)     S/P CABG x 4 6/2014    [facility]congest[facility] ([facility])     Chronic back pain     Excessive daytime sleepiness 9/19/2018    H/O cardiovascular stress test 5/22/2014    EF 50%. There is evidence of mild to moderate ischemia in the mid inferolateral regions.  H/O diagnostic tests 01/27/2011    Aortic Duplex scan. Normal study.  H/O Doppler ultrasound 07/19/2017    Carotid-Moderate disease of the Bilateral internal carotid arteries. Calcific plaque noted throughout.  H/O echocardiogram 7/19/17,6/3/14    EF 55-60% Mild AS Aortic valve leaflets are somewhat thickened. Mildly enlarged right atrium size and dimension.  History of echocardiogram 12/14/2016    LV function is normal. Mild aortic Stenosis noted.  History of nuclear stress test 7/18/17, 7/31/2018    Normal LV function. Normal study. EF 45%.  Hx of echocardiogram 1/26/16    EF 40%, depressed LV function, Moderate LVH, moderate to severe aortic insufficiency and mild aortic stenosis.  Hyperlipidemia     Hypertension     Obstructive sleep apnea 10/29/2018     Past Surgical History:   Procedure Laterality Date    APPENDECTOMY      CHOLECYSTECTOMY      CORONARY ANGIOPLASTY WITH STENT PLACEMENT  2006    stent to the LAD and Circ    CORONARY ARTERY BYPASS GRAFT  6/4/14    CABG x4; LIMA to LAD & diag, SVG to CX marginal 1, SVG to CX marginal 2    DIAGNOSTIC CARDIAC CATH LAB PROCEDURE  03/07/2006    Normal LM, 60-70% ostial and prox LAD, 30% prox. 1st diag, 60% prox.  circumflex (co-dominant vessel and collateralizes RCA), 80% OM2, 100% occluded RCA, very small PDA that fills by collaterals from CX, small abd aortic aneurysm, mild MR, normal aortic arch and origin of great vessels    EYE SURGERY Right     Cataract Extraction    EYE SURGERY Left 2014    Cataract Extraction    HERNIA REPAIR      Umbilical    TONSILLECTOMY Bilateral      Family History   Problem Relation Age of Onset    Heart Disease Father      Social History     Tobacco Use    Smoking status: Former Smoker     Packs/day: 1.00     Years: 25.00     Pack years: 25.00     Last attempt to quit: 1974     Years since quittin.2    Smokeless tobacco: Never Used   Substance Use Topics    Alcohol use: No      [unfilled]  Review of Systems:   · Constitutional: No Fever or Weight Loss   · Eyes: No Decreased Vision  · ENT: No Headaches, Hearing Loss or Vertigo  · Cardiovascular: No chest pain, dyspnea on exertion, palpitations or loss of consciousness  · Respiratory: No cough or wheezing    · Gastrointestinal: No abdominal pain, appetite loss, blood in stools, constipation, diarrhea or heartburn  · Genitourinary: No dysuria, trouble voiding, or hematuria  · Musculoskeletal:  No gait disturbance, weakness or joint complaints  · Integumentary: No rash or pruritis  · Neurological: No TIA or stroke symptoms  · Psychiatric: No anxiety or depression  · Endocrine: No malaise, fatigue or temperature intolerance  · Hematologic/Lymphatic: No bleeding problems, blood clots or swollen lymph nodes  · Allergic/Immunologic: No nasal congestion or hives  All systems negative except as marked. Objective:  BP (!) 108/58 (Site: Left Upper Arm, Position: Sitting, Cuff Size: Medium Adult)   Pulse 72   Resp 18   Ht 5' 4.5\" (1.638 m)   Wt 189 lb (85.7 kg)   BMI 31.94 kg/m²   Wt Readings from Last 3 Encounters:   19 189 lb (85.7 kg)   19 186 lb (84.4 kg)   18 187 lb (84.8 kg)     Body mass index is 31.94 kg/m².   GENERAL - Alert, oriented, pleasant, in no apparent distress,normal grooming  HEENT - pupils are reactive to light and accomodation, cornea y.o.year old who  has a past medical history of Atrial fibrillation ([facility] CAD (coronary artery disease), CHF (congestive heart failure) ([address] Chronic back pain, Excessive daytime sleepiness, H/O cardiovascular stress test, H/O diagnostic tests, H/O Doppler ultrasound, H/O echocardiogram, History of echocardiogram, History of nuclear stress test, Hx of echocardiogram, Hyperlipidemia, Hypertension, and Obstructive sleep apnea. and presents with     1. Plan:   2. Erectile dysfunction: add viagra  3. CAD s/p CABG and history of PCI before CABG, has dypsnea on exertion:STABLE, [facility], reviewed, he has patent 4/4 grafts, his LVEF is 45% on NM,its been low for few yrs, case explained and discussed with patient and family. Continue plavix, vytorin, ranexa. lisinoril and lopressor  4. Shortness of breath:NORMAL PFT, its from deconditioning,  5. Sleep apnea: recommend to start CPAP, patient did not start the CPAP  6. Paroxysmal afib:not a candidate for anticoagulation for GI bleeding and falls  7. Dyslipidemia: continue statins  8. HTN: stable, continue lopressor and lisinopril medicatons  9. Dizziness: as per neurologyHealth maintenance: exerise and       All labs, medications and tests reviewed, continue all other medications of all above medical condition listed as is.

## 2019-06-20 DIAGNOSIS — I25.10 CORONARY ARTERY DISEASE INVOLVING NATIVE HEART WITHOUT ANGINA PECTORIS, UNSPECIFIED VESSEL OR LESION TYPE: ICD-10-CM

## 2019-06-20 DIAGNOSIS — E78.2 MIXED HYPERLIPIDEMIA: ICD-10-CM

## 2019-06-20 DIAGNOSIS — I50.22 CHRONIC SYSTOLIC CHF (CONGESTIVE HEART FAILURE) (HCC): ICD-10-CM

## 2019-06-20 RX ORDER — EZETIMIBE AND SIMVASTATIN 10; 20 MG/1; MG/1
TABLET ORAL
Qty: 90 TABLET | Refills: 3 | Status: SHIPPED | OUTPATIENT
Start: 2019-06-20 | End: 2020-06-08 | Stop reason: SDUPTHER

## 2019-07-30 RX ORDER — LISINOPRIL 2.5 MG/1
TABLET ORAL
Qty: 90 TABLET | Refills: 3 | Status: SHIPPED | OUTPATIENT
Start: 2019-07-30 | End: 2019-09-18

## 2019-09-18 ENCOUNTER — OFFICE VISIT (OUTPATIENT)
Dept: FAMILY MEDICINE CLINIC | Age: 84
End: 2019-09-18
Payer: MEDICARE

## 2019-09-18 VITALS
BODY MASS INDEX: 31.64 KG/M2 | WEIGHT: 187.2 LBS | SYSTOLIC BLOOD PRESSURE: 160 MMHG | HEART RATE: 77 BPM | RESPIRATION RATE: 18 BRPM | DIASTOLIC BLOOD PRESSURE: 80 MMHG | OXYGEN SATURATION: 96 %

## 2019-09-18 DIAGNOSIS — G89.29 CHRONIC BILATERAL LOW BACK PAIN WITHOUT SCIATICA: ICD-10-CM

## 2019-09-18 DIAGNOSIS — I10 ESSENTIAL HYPERTENSION: ICD-10-CM

## 2019-09-18 DIAGNOSIS — Z87.11 HISTORY OF PEPTIC ULCER: ICD-10-CM

## 2019-09-18 DIAGNOSIS — M17.0 PRIMARY OSTEOARTHRITIS OF BOTH KNEES: ICD-10-CM

## 2019-09-18 DIAGNOSIS — R53.83 FATIGUE, UNSPECIFIED TYPE: ICD-10-CM

## 2019-09-18 DIAGNOSIS — Z79.01 CHRONIC ANTICOAGULATION: ICD-10-CM

## 2019-09-18 DIAGNOSIS — I51.9 SYSTOLIC DYSFUNCTION, LEFT VENTRICLE: ICD-10-CM

## 2019-09-18 DIAGNOSIS — R06.09 DYSPNEA ON EXERTION: Primary | ICD-10-CM

## 2019-09-18 DIAGNOSIS — I25.10 CORONARY ARTERY DISEASE INVOLVING NATIVE CORONARY ARTERY OF NATIVE HEART WITHOUT ANGINA PECTORIS: ICD-10-CM

## 2019-09-18 DIAGNOSIS — M54.50 CHRONIC BILATERAL LOW BACK PAIN WITHOUT SCIATICA: ICD-10-CM

## 2019-09-18 DIAGNOSIS — R26.9 GAIT DISORDER: ICD-10-CM

## 2019-09-18 DIAGNOSIS — N18.30 CKD (CHRONIC KIDNEY DISEASE), STAGE III (HCC): ICD-10-CM

## 2019-09-18 PROBLEM — R11.0 NAUSEA: Status: RESOLVED | Noted: 2018-05-29 | Resolved: 2019-09-18

## 2019-09-18 PROBLEM — J40 BRONCHITIS: Status: RESOLVED | Noted: 2017-12-26 | Resolved: 2019-09-18

## 2019-09-18 PROBLEM — R19.7 DIARRHEA: Status: RESOLVED | Noted: 2018-05-29 | Resolved: 2019-09-18

## 2019-09-18 PROBLEM — K92.1 BLACK STOOLS: Status: RESOLVED | Noted: 2018-11-14 | Resolved: 2019-09-18

## 2019-09-18 PROCEDURE — 1036F TOBACCO NON-USER: CPT | Performed by: FAMILY MEDICINE

## 2019-09-18 PROCEDURE — G8427 DOCREV CUR MEDS BY ELIG CLIN: HCPCS | Performed by: FAMILY MEDICINE

## 2019-09-18 PROCEDURE — G8417 CALC BMI ABV UP PARAM F/U: HCPCS | Performed by: FAMILY MEDICINE

## 2019-09-18 PROCEDURE — 4040F PNEUMOC VAC/ADMIN/RCVD: CPT | Performed by: FAMILY MEDICINE

## 2019-09-18 PROCEDURE — 36415 COLL VENOUS BLD VENIPUNCTURE: CPT | Performed by: FAMILY MEDICINE

## 2019-09-18 PROCEDURE — 99214 OFFICE O/P EST MOD 30 MIN: CPT | Performed by: FAMILY MEDICINE

## 2019-09-18 PROCEDURE — 1123F ACP DISCUSS/DSCN MKR DOCD: CPT | Performed by: FAMILY MEDICINE

## 2019-09-18 PROCEDURE — G8598 ASA/ANTIPLAT THER USED: HCPCS | Performed by: FAMILY MEDICINE

## 2019-09-18 RX ORDER — LISINOPRIL 5 MG/1
TABLET ORAL
Qty: 90 TABLET | Refills: 3 | Status: ON HOLD | OUTPATIENT
Start: 2019-09-18 | End: 2020-05-28 | Stop reason: HOSPADM

## 2019-09-18 RX ORDER — ALBUTEROL SULFATE 90 UG/1
2 AEROSOL, METERED RESPIRATORY (INHALATION) 4 TIMES DAILY PRN
Qty: 1 INHALER | Refills: 5 | Status: SHIPPED | OUTPATIENT
Start: 2019-09-18 | End: 2019-09-18 | Stop reason: SDUPTHER

## 2019-09-18 RX ORDER — ALBUTEROL SULFATE 90 UG/1
2 AEROSOL, METERED RESPIRATORY (INHALATION) 4 TIMES DAILY PRN
Qty: 1 INHALER | Refills: 5 | Status: SHIPPED | OUTPATIENT
Start: 2019-09-18 | End: 2020-05-13 | Stop reason: SDUPTHER

## 2019-09-18 ASSESSMENT — ENCOUNTER SYMPTOMS
NAUSEA: 0
CHEST TIGHTNESS: 0
RHINORRHEA: 0
SORE THROAT: 0
SINUS PRESSURE: 0
BLOOD IN STOOL: 0
BACK PAIN: 1
WHEEZING: 0
COUGH: 0
SHORTNESS OF BREATH: 0
ABDOMINAL PAIN: 0
CONSTIPATION: 0
ABDOMINAL DISTENTION: 0
DIARRHEA: 0
VOMITING: 0

## 2019-09-18 NOTE — PROGRESS NOTES
kg).    Physical Exam   Constitutional: He is oriented to person, place, and time. He appears well-developed and well-nourished. HENT:   Head: Normocephalic and atraumatic. Right Ear: External ear normal.   Left Ear: External ear normal.   Mouth/Throat: Oropharynx is clear and moist.   Eyes: Pupils are equal, round, and reactive to light. Conjunctivae and EOM are normal.   Neck: Normal range of motion. Neck supple. No JVD present. No tracheal deviation present. No thyromegaly present. Cardiovascular: Normal rate, regular rhythm, normal heart sounds and intact distal pulses. Pulmonary/Chest: Effort normal and breath sounds normal. He has no wheezes. He has no rales. He exhibits tenderness. No rales, lungs clear, severe R lower ches wall tenderness hurts with compression superiorly with pain felt and ant lateal lower chest.   Abdominal: Soft. Bowel sounds are normal. He exhibits no distension and no mass. There is no tenderness. There is no rebound and no guarding. Musculoskeletal: Normal range of motion. He exhibits no edema. Normal exam   Lymphadenopathy:     He has no cervical adenopathy. Neurological: He is alert and oriented to person, place, and time. He has normal reflexes. Mild resting trremor symmetrical. No intention tremor. Skin: Skin is warm and dry. No rash noted. Psychiatric: He has a normal mood and affect. His behavior is normal. Judgment and thought content normal.       ASSESSMENT/PLAN:  1. Dyspnea on exertion  Pt would like to try albuteraol  Hx worse with anemia,  No evidence active chf. Encouraged to stay active. - CBC Auto Differential  - TSH without Reflex  - albuterol sulfate  (90 Base) MCG/ACT inhaler; Inhale 2 puffs into the lungs 4 times daily as needed for Wheezing  Dispense: 1 Inhaler; Refill: 5    2. Systolic dysfunction, left ventricle  Ef 35%    3.  Coronary artery disease involving native coronary artery of native heart without angina pectoris  Most

## 2019-09-19 LAB
A/G RATIO: 1.7 (ref 1.1–2.2)
ALBUMIN SERPL-MCNC: 4.6 G/DL (ref 3.4–5)
ALP BLD-CCNC: 55 U/L (ref 40–129)
ALT SERPL-CCNC: 12 U/L (ref 10–40)
ANION GAP SERPL CALCULATED.3IONS-SCNC: 12 MMOL/L (ref 3–16)
AST SERPL-CCNC: 18 U/L (ref 15–37)
BASOPHILS ABSOLUTE: 0.1 K/UL (ref 0–0.2)
BASOPHILS RELATIVE PERCENT: 1.1 %
BILIRUB SERPL-MCNC: 0.4 MG/DL (ref 0–1)
BUN BLDV-MCNC: 24 MG/DL (ref 7–20)
C-REACTIVE PROTEIN: 1.1 MG/L (ref 0–5.1)
CALCIUM SERPL-MCNC: 9.5 MG/DL (ref 8.3–10.6)
CHLORIDE BLD-SCNC: 105 MMOL/L (ref 99–110)
CHOLESTEROL, TOTAL: 130 MG/DL (ref 0–199)
CO2: 25 MMOL/L (ref 21–32)
CREAT SERPL-MCNC: 1.5 MG/DL (ref 0.8–1.3)
EOSINOPHILS ABSOLUTE: 0.2 K/UL (ref 0–0.6)
EOSINOPHILS RELATIVE PERCENT: 2.4 %
GFR AFRICAN AMERICAN: 54
GFR NON-AFRICAN AMERICAN: 45
GLOBULIN: 2.7 G/DL
GLUCOSE BLD-MCNC: 88 MG/DL (ref 70–99)
HCT VFR BLD CALC: 36 % (ref 40.5–52.5)
HDLC SERPL-MCNC: 65 MG/DL (ref 40–60)
HEMOGLOBIN: 12.2 G/DL (ref 13.5–17.5)
LDL CHOLESTEROL CALCULATED: 46 MG/DL
LYMPHOCYTES ABSOLUTE: 2.3 K/UL (ref 1–5.1)
LYMPHOCYTES RELATIVE PERCENT: 22.8 %
MCH RBC QN AUTO: 35.3 PG (ref 26–34)
MCHC RBC AUTO-ENTMCNC: 33.8 G/DL (ref 31–36)
MCV RBC AUTO: 104.2 FL (ref 80–100)
MONOCYTES ABSOLUTE: 1.2 K/UL (ref 0–1.3)
MONOCYTES RELATIVE PERCENT: 12.5 %
NEUTROPHILS ABSOLUTE: 6.1 K/UL (ref 1.7–7.7)
NEUTROPHILS RELATIVE PERCENT: 61.2 %
PDW BLD-RTO: 15.4 % (ref 12.4–15.4)
PLATELET # BLD: 177 K/UL (ref 135–450)
PMV BLD AUTO: 8.5 FL (ref 5–10.5)
POTASSIUM SERPL-SCNC: 5.1 MMOL/L (ref 3.5–5.1)
RBC # BLD: 3.45 M/UL (ref 4.2–5.9)
SEDIMENTATION RATE, ERYTHROCYTE: 34 MM/HR (ref 0–20)
SODIUM BLD-SCNC: 142 MMOL/L (ref 136–145)
TOTAL PROTEIN: 7.3 G/DL (ref 6.4–8.2)
TRIGL SERPL-MCNC: 93 MG/DL (ref 0–150)
TSH SERPL DL<=0.05 MIU/L-ACNC: 2.76 UIU/ML (ref 0.27–4.2)
VLDLC SERPL CALC-MCNC: 19 MG/DL
WBC # BLD: 9.9 K/UL (ref 4–11)

## 2019-09-24 DIAGNOSIS — I10 ESSENTIAL HYPERTENSION: ICD-10-CM

## 2019-09-24 RX ORDER — TRIAMTERENE AND HYDROCHLOROTHIAZIDE 37.5; 25 MG/1; MG/1
TABLET ORAL
Qty: 90 TABLET | Refills: 3 | Status: SHIPPED | OUTPATIENT
Start: 2019-09-24 | End: 2020-01-07 | Stop reason: ALTCHOICE

## 2019-11-19 ENCOUNTER — OFFICE VISIT (OUTPATIENT)
Dept: FAMILY MEDICINE CLINIC | Age: 84
End: 2019-11-19
Payer: MEDICARE

## 2019-11-19 VITALS
OXYGEN SATURATION: 95 % | RESPIRATION RATE: 16 BRPM | DIASTOLIC BLOOD PRESSURE: 60 MMHG | SYSTOLIC BLOOD PRESSURE: 102 MMHG | WEIGHT: 189.4 LBS | BODY MASS INDEX: 33.56 KG/M2 | HEIGHT: 63 IN | HEART RATE: 66 BPM

## 2019-11-19 DIAGNOSIS — Z00.00 ROUTINE GENERAL MEDICAL EXAMINATION AT A HEALTH CARE FACILITY: Primary | ICD-10-CM

## 2019-11-19 PROCEDURE — 1123F ACP DISCUSS/DSCN MKR DOCD: CPT | Performed by: FAMILY MEDICINE

## 2019-11-19 PROCEDURE — G0439 PPPS, SUBSEQ VISIT: HCPCS | Performed by: FAMILY MEDICINE

## 2019-11-19 PROCEDURE — 4040F PNEUMOC VAC/ADMIN/RCVD: CPT | Performed by: FAMILY MEDICINE

## 2019-11-19 PROCEDURE — G8598 ASA/ANTIPLAT THER USED: HCPCS | Performed by: FAMILY MEDICINE

## 2019-11-19 PROCEDURE — G8482 FLU IMMUNIZE ORDER/ADMIN: HCPCS | Performed by: FAMILY MEDICINE

## 2019-11-19 ASSESSMENT — PATIENT HEALTH QUESTIONNAIRE - PHQ9
SUM OF ALL RESPONSES TO PHQ QUESTIONS 1-9: 1
SUM OF ALL RESPONSES TO PHQ QUESTIONS 1-9: 1

## 2019-11-19 ASSESSMENT — LIFESTYLE VARIABLES: HOW OFTEN DO YOU HAVE A DRINK CONTAINING ALCOHOL: 0

## 2019-11-26 DIAGNOSIS — R07.9 CHEST PAIN AT REST: ICD-10-CM

## 2019-11-26 RX ORDER — RANOLAZINE 1000 MG/1
1000 TABLET, EXTENDED RELEASE ORAL 2 TIMES DAILY
Qty: 180 TABLET | Refills: 3 | Status: SHIPPED | OUTPATIENT
Start: 2019-11-26 | End: 2020-09-11 | Stop reason: SDUPTHER

## 2020-01-07 ENCOUNTER — OFFICE VISIT (OUTPATIENT)
Dept: FAMILY MEDICINE CLINIC | Age: 85
End: 2020-01-07
Payer: MEDICARE

## 2020-01-07 VITALS
RESPIRATION RATE: 22 BRPM | SYSTOLIC BLOOD PRESSURE: 122 MMHG | TEMPERATURE: 99.8 F | HEART RATE: 77 BPM | OXYGEN SATURATION: 93 % | BODY MASS INDEX: 32.3 KG/M2 | WEIGHT: 183.8 LBS | DIASTOLIC BLOOD PRESSURE: 80 MMHG

## 2020-01-07 PROBLEM — J20.9 ACUTE BRONCHITIS DUE TO INFECTION: Status: ACTIVE | Noted: 2020-01-07

## 2020-01-07 PROCEDURE — G8427 DOCREV CUR MEDS BY ELIG CLIN: HCPCS | Performed by: PHYSICIAN ASSISTANT

## 2020-01-07 PROCEDURE — G8417 CALC BMI ABV UP PARAM F/U: HCPCS | Performed by: PHYSICIAN ASSISTANT

## 2020-01-07 PROCEDURE — 99213 OFFICE O/P EST LOW 20 MIN: CPT | Performed by: PHYSICIAN ASSISTANT

## 2020-01-07 PROCEDURE — 4040F PNEUMOC VAC/ADMIN/RCVD: CPT | Performed by: PHYSICIAN ASSISTANT

## 2020-01-07 PROCEDURE — 1123F ACP DISCUSS/DSCN MKR DOCD: CPT | Performed by: PHYSICIAN ASSISTANT

## 2020-01-07 PROCEDURE — 1036F TOBACCO NON-USER: CPT | Performed by: PHYSICIAN ASSISTANT

## 2020-01-07 PROCEDURE — G8482 FLU IMMUNIZE ORDER/ADMIN: HCPCS | Performed by: PHYSICIAN ASSISTANT

## 2020-01-07 RX ORDER — MOXIFLOXACIN HYDROCHLORIDE 400 MG/1
400 TABLET ORAL DAILY
Qty: 5 TABLET | Refills: 0 | Status: SHIPPED | OUTPATIENT
Start: 2020-01-07 | End: 2020-01-17

## 2020-01-07 RX ORDER — BENZONATATE 100 MG/1
100 CAPSULE ORAL 3 TIMES DAILY PRN
Qty: 30 CAPSULE | Refills: 0 | Status: SHIPPED | OUTPATIENT
Start: 2020-01-07 | End: 2020-01-14

## 2020-01-07 ASSESSMENT — PATIENT HEALTH QUESTIONNAIRE - PHQ9
SUM OF ALL RESPONSES TO PHQ9 QUESTIONS 1 & 2: 0
2. FEELING DOWN, DEPRESSED OR HOPELESS: 0
SUM OF ALL RESPONSES TO PHQ QUESTIONS 1-9: 0
SUM OF ALL RESPONSES TO PHQ QUESTIONS 1-9: 0
1. LITTLE INTEREST OR PLEASURE IN DOING THINGS: 0

## 2020-01-07 ASSESSMENT — ENCOUNTER SYMPTOMS
SORE THROAT: 0
COUGH: 1
CHEST TIGHTNESS: 1
SHORTNESS OF BREATH: 1
WHEEZING: 0
TROUBLE SWALLOWING: 0
VOMITING: 0
ABDOMINAL PAIN: 0
SINUS PRESSURE: 1
NAUSEA: 0
SINUS PAIN: 1

## 2020-01-07 NOTE — PROGRESS NOTES
[place]  1935  80 y.o.  male    SUBJECTIVE:    Chief Complaint   Patient presents with    Cough     patient has a cough w/prod yellow/green mucus has chest congestion feels SOB    Fever     feels feverish     Nasal Congestion     has nasal congestion and runny nose just not feeling well       HPI   URI-onset 4-5 days ago. Cough/congestion/occ productive green/yellow phlegm. Some sob with exertion, tactile fever/chills. Pt denies wt gain/edema/chest pain. HTN-The patient is taking hypertensive medications compliantly without side effects. Denies chest pain, edema, or TIA's. Pt has tried OTC cold medication for above sxs.     Current Outpatient Medications on File Prior to Visit   Medication Sig Dispense Refill    metoprolol tartrate (LOPRESSOR) 25 MG tablet Take 0.5 tablets by mouth 2 times daily 180 tablet 3    ranolazine (RANEXA) 1000 MG extended release tablet Take 1 tablet by mouth 2 times daily 180 tablet 3    lisinopril (PRINIVIL;ZESTRIL) 5 MG tablet TAKE 1 TABLET DAILY 90 tablet 3    albuterol sulfate  (90 Base) MCG/ACT inhaler Inhale 2 puffs into the lungs 4 times daily as needed for Wheezing 1 Inhaler 5    clopidogrel (PLAVIX) 75 MG tablet TAKE 1 TABLET DAILY 30 tablet 5    ezetimibe-simvastatin (VYTORIN) 10-20 MG per tablet TAKE 1 TABLET NIGHTLY 90 tablet 3    acetaminophen (TYLENOL) 500 MG tablet Take 1,000 mg by mouth every 6 hours as needed for Pain      sildenafil (VIAGRA) 50 MG tablet Take 1 tablet by mouth as needed for Erectile Dysfunction 30 tablet 3    sertraline (ZOLOFT) 100 MG tablet Take 1 tablet by mouth daily (Patient taking differently: Take 50 mg by mouth 2 times daily ) 90 tablet 3    nitroGLYCERIN (NITROSTAT) 0.4 MG SL tablet Place 1 tablet under the tongue every 5 minutes as needed for Chest pain 25 tablet 3    nitroGLYCERIN (NITROSTAT) 0.4 MG SL tablet PLACE 1 TABLET UNDER THE TONGUE EVERY 5 MINUTES AS NEEDED FOR CHEST PAIN 25 tablet 2    vitamin B-1 BYPASS GRAFT  14    CABG x4; LIMA to LAD & diag, SVG to CX marginal 1, SVG to CX marginal 2    DIAGNOSTIC CARDIAC CATH LAB PROCEDURE  2006    Normal LM, 60-70% ostial and prox LAD, 30% prox. 1st diag, 60% prox.  circumflex (co-dominant vessel and collateralizes RCA), 80% OM2, 100% occluded RCA, very small PDA that fills by collaterals from CX, small abd aortic aneurysm, mild MR, normal aortic arch and origin of great vessels    EYE SURGERY Right     Cataract Extraction    EYE SURGERY Left 2014    Cataract Extraction    HERNIA REPAIR      Umbilical    TONSILLECTOMY Bilateral        Social History     Socioeconomic History    Marital status:      Spouse name: None    Number of children: None    Years of education: None    Highest education level: None   Occupational History    None   Social Needs    Financial resource strain: None    Food insecurity:     Worry: None     Inability: None    Transportation needs:     Medical: None     Non-medical: None   Tobacco Use    Smoking status: Former Smoker     Packs/day: 1.00     Years: 25.00     Pack years: 25.00     Last attempt to quit: 1974     Years since quittin.7    Smokeless tobacco: Never Used   Substance and Sexual Activity    Alcohol use: No    Drug use: No    Sexual activity: Yes     Partners: Female     Comment:    Lifestyle    Physical activity:     Days per week: None     Minutes per session: None    Stress: None   Relationships    Social connections:     Talks on phone: None     Gets together: None     Attends Mosque service: None     Active member of club or organization: None     Attends meetings of clubs or organizations: None     Relationship status: None    Intimate partner violence:     Fear of current or ex partner: None     Emotionally abused: None     Physically abused: None     Forced sexual activity: None   Other Topics Concern    None   Social History Narrative    None       Review of (NITROSTAT) 0.4 MG SL tablet    sildenafil (VIAGRA) 50 MG tablet    ezetimibe-simvastatin (VYTORIN) 10-20 MG per tablet    lisinopril (PRINIVIL;ZESTRIL) 5 MG tablet    metoprolol tartrate (LOPRESSOR) 25 MG tablet    ranolazine (RANEXA) 1000 MG extended release tablet       Respiratory    Acute bronchitis due to infection - Primary     avelox as directed-safe with CKD, tessalon perles as directed  Rest, fluids, healthy diet. Follow up if not improving in next few days, sooner if worse                      Return if symptoms worsen or fail to improve.

## 2020-01-07 NOTE — ASSESSMENT & PLAN NOTE
avelox as directed-safe with CKD, tessalon perles as directed  Rest, fluids, healthy diet.  Follow up if not improving in next few days, sooner if worse

## 2020-01-08 ENCOUNTER — OFFICE VISIT (OUTPATIENT)
Dept: CARDIOLOGY CLINIC | Age: 85
End: 2020-01-08
Payer: MEDICARE

## 2020-01-08 VITALS
DIASTOLIC BLOOD PRESSURE: 64 MMHG | HEIGHT: 66 IN | WEIGHT: 184 LBS | HEART RATE: 72 BPM | BODY MASS INDEX: 29.57 KG/M2 | SYSTOLIC BLOOD PRESSURE: 106 MMHG

## 2020-01-08 PROCEDURE — G8428 CUR MEDS NOT DOCUMENT: HCPCS | Performed by: INTERNAL MEDICINE

## 2020-01-08 PROCEDURE — 1036F TOBACCO NON-USER: CPT | Performed by: INTERNAL MEDICINE

## 2020-01-08 PROCEDURE — 1123F ACP DISCUSS/DSCN MKR DOCD: CPT | Performed by: INTERNAL MEDICINE

## 2020-01-08 PROCEDURE — G8417 CALC BMI ABV UP PARAM F/U: HCPCS | Performed by: INTERNAL MEDICINE

## 2020-01-08 PROCEDURE — G8482 FLU IMMUNIZE ORDER/ADMIN: HCPCS | Performed by: INTERNAL MEDICINE

## 2020-01-08 PROCEDURE — 4040F PNEUMOC VAC/ADMIN/RCVD: CPT | Performed by: INTERNAL MEDICINE

## 2020-01-08 PROCEDURE — 99214 OFFICE O/P EST MOD 30 MIN: CPT | Performed by: INTERNAL MEDICINE

## 2020-01-08 NOTE — PROGRESS NOTES
tablet Take 1 tablet by mouth daily (Patient taking differently: Take 50 mg by mouth 2 times daily ) 90 tablet 3    nitroGLYCERIN (NITROSTAT) 0.4 MG SL tablet PLACE 1 TABLET UNDER THE TONGUE EVERY 5 MINUTES AS NEEDED FOR CHEST PAIN 25 tablet 2    vitamin B-1 (THIAMINE) 100 MG tablet Take 1 tablet by mouth daily 90 tablet 3    nitroGLYCERIN (NITROSTAT) 0.4 MG SL tablet Place 1 tablet under the tongue every 5 minutes as needed for Chest pain 25 tablet 3     No current facility-administered medications for this visit. Allergies: Patient has no known allergies. Past Medical History:   Diagnosis Date    Atrial fibrillation ([facility] [address]     CAD (coronary artery disease)     S/P CABG x 4 6/2014    CH[facility]congestive heart failure) ([facility])     Chronic back pain     Excessive daytime sleepiness 9/19/2018    H/O cardiovascular stress test 5/22/2014    EF 50%. There is evidence of mild to moderate ischemia in the mid inferolateral regions.  H/O diagnostic tests 01/27/2011    Aortic Duplex scan. Normal study.  H/O Doppler ultrasound 07/19/2017    Carotid-Moderate disease of the Bilateral internal carotid arteries. Calcific plaque noted throughout.  H/O echocardiogram 7/19/17,6/3/14    EF 55-60% Mild AS Aortic valve leaflets are somewhat thickened. Mildly enlarged right atrium size and dimension.  History of echocardiogram 12/14/2016    LV function is normal. Mild aortic Stenosis noted.  History of nuclear stress test 7/18/17, 7/31/2018    Normal LV function. Normal study. EF 45%.  Hx of echocardiogram 1/26/16    EF 40%, depressed LV function, Moderate LVH, moderate to severe aortic insufficiency and mild aortic stenosis.      Hyperlipidemia     Hypertension     Obstructive sleep apnea 10/29/2018     Past Surgical History:   Procedure Laterality Date    APPENDECTOMY      CHOLECYSTECTOMY      CORONARY ANGIOPLASTY WITH STENT PLACEMENT  2006    stent to the LAD and Circ    CORONARY ARTERY BYPASS GRAFT  14    CABG x4; LIMA to LAD & diag, SVG to CX marginal 1, SVG to CX marginal 2    DIAGNOSTIC CARDIAC CATH LAB PROCEDURE  2006    Normal LM, 60-70% ostial and prox LAD, 30% prox. 1st diag, 60% prox. circumflex (co-dominant vessel and collateralizes RCA), 80% OM2, 100% occluded RCA, very small PDA that fills by collaterals from CX, small abd aortic aneurysm, mild MR, normal aortic arch and origin of great vessels    EYE SURGERY Right     Cataract Extraction    EYE SURGERY Left 2014    Cataract Extraction    HERNIA REPAIR      Umbilical    TONSILLECTOMY Bilateral      Family History   Problem Relation Age of Onset    Heart Disease Father      Social History     Tobacco Use    Smoking status: Former Smoker     Packs/day: 1.00     Years: 25.00     Pack years: 25.00     Last attempt to quit: 1974     Years since quittin.7    Smokeless tobacco: Never Used   Substance Use Topics    Alcohol use: No      [unfilled]  Review of Systems:   · Constitutional: No Fever or Weight Loss   · Eyes: No Decreased Vision  · ENT: No Headaches, Hearing Loss or Vertigo  · Cardiovascular: No chest pain, dyspnea on exertion, palpitations or loss of consciousness  · Respiratory: + cough or wheezing    · Gastrointestinal: No abdominal pain, appetite loss, blood in stools, constipation, diarrhea or heartburn  · Genitourinary: No dysuria, trouble voiding, or hematuria  · Musculoskeletal:  No gait disturbance, weakness or joint complaints  · Integumentary: No rash or pruritis  · Neurological: No TIA or stroke symptoms  · Psychiatric: No anxiety or depression  · Endocrine: No malaise, fatigue or temperature intolerance  · Hematologic/Lymphatic: No bleeding problems, blood clots or swollen lymph nodes  · Allergic/Immunologic: No nasal congestion or hives  All systems negative except as marked.    Objective:  /64   Pulse 72   Ht 5' 6\" (1.676 m)   Wt 184 lb (83.5 kg)   BMI 29.70 kg/m²   Wt Readings from Last 3 Encounters:   01/08/20 184 lb (83.5 kg)   01/07/20 183 lb 12.8 oz (83.4 kg)   11/19/19 189 lb 6.4 oz (85.9 kg)     Body mass index is 29.7 kg/m². GENERAL - Alert, oriented, pleasant, in no apparent distress,normal grooming  HEENT - pupils are reactive to light and accomodation, cornea intact, conjunctive normal color, ears are normal in exam,throat exam in normal, teeth, gum and palate are normal, oral mucosa is normal without any notation of pallor or cyanosis  Neck - Supple. No jugular venous distention noted. No carotid bruits, no apical -carotid delay  Respiratory:  Normal breath sounds, No respiratory distress, No wheezing, No chest tenderness. ,no use of accessory muscles, diaphragm movement is normal  Cardiovascular: (PMI) apex non displaced,no lifts no thrills, no s3,no s4, Normal heart rate, Normal rhythm, No murmurs, No rubs, No gallops. Carotid arteries pulse and amplitude are normal no bruit, no abdominal bruit noted ( normal abdominal aorta ausculation), femoral arteries pulse and amplitude are normal no bruit, pedal pulses are normal  Femoral pulses have normal amplitude, no bruits   Extremities - No cyanosis, clubbing, or significant edema, no varicose veins    Abdomen - No masses, tenderness, or organomegaly, no hepato-splenomegally, no bruits  Musculoskeletal - No significant edema, no kyphosis or scoliosis, no deformity in any extremity noted, muscle strength and tone are normal  Skin: no ulcer,no scar,no stasis dermatitis   Neurologic - alert oriented times 3,Cranial nerves II through XII are grossly intact. There were no gross focal neurologic abnormalities.  All sensory and motor nerves examined and were normal  Psychiatric: normal mood and affect    No results found for: CKTOTAL, CKMB, CKMBINDEX, TROPONINI  BNP:  No results found for: BNP  PT/INR:  No results found for: PTINR  Lab Results   Component Value Date    LABA1C 6.1 06/08/2014    LABA1C 5.8 06/03/2014     Lab Results Component Value Date    CHOL 130 09/18/2019    TRIG 93 09/18/2019    HDL 65 (H) 09/18/2019    LDLCALC 46 09/18/2019     Lab Results   Component Value Date    ALT 12 09/18/2019    AST 18 09/18/2019     TSH:    Lab Results   Component Value Date    TSH 2.76 09/18/2019       Impression:  Karime Rodríguez is a 80 y. o.year old who  has a past medical history of Atrial fibrillation ([facility] [address] CAD (coronary artery disease), CHF (congestive heart failure) ([facility] [address] Chronic back pain, Excessive daytime sleepiness, H/O cardiovascular stress test, H/O diagnostic tests, H/O Doppler ultrasound, H/O echocardiogram, History of echocardiogram, History of nuclear stress test, Hx of echocardiogram, Hyperlipidemia, Hypertension, and Obstructive sleep apnea. and presents with     Plan:  1. Cough and bronchitis: continue avelox and tessalon  2. CAD s/p CABG and history of PCI before CABG, has dypsnea on exertion:STABLE, [facility], reviewed, he has patent 4/4 grafts, his LVEF is 45% on NM,its been low for few yrs, case explained and discussed with patient and family. Continue plavix, vytorin, ranexa. lisinoril and lopressor  3. Shortness of breath:NORMAL PFT, its from deconditioning,  4. Sleep apnea: recommend to start CPAP, patient did not start the CPAP  5. Paroxysmal afib:not a candidate for anticoagulation for GI bleeding and falls  6. Dyslipidemia: continue statins  7. HTN: stable, continue lopressor and lisinopril medicatons  8. Dizziness: as per neurology  9. Health maintenance: exerise and All labs, medications and tests reviewed, continue all other medications of all above medical condition listed as is.     [unfilled]

## 2020-01-31 RX ORDER — SERTRALINE HYDROCHLORIDE 100 MG/1
TABLET, FILM COATED ORAL
Qty: 90 TABLET | Refills: 3 | Status: SHIPPED | OUTPATIENT
Start: 2020-01-31 | End: 2020-09-11 | Stop reason: SDUPTHER

## 2020-02-03 RX ORDER — CLOPIDOGREL BISULFATE 75 MG/1
TABLET ORAL
Qty: 30 TABLET | Refills: 5 | Status: SHIPPED | OUTPATIENT
Start: 2020-02-03 | End: 2020-07-07 | Stop reason: SDUPTHER

## 2020-04-15 ENCOUNTER — OFFICE VISIT (OUTPATIENT)
Dept: CARDIOLOGY CLINIC | Age: 85
End: 2020-04-15
Payer: MEDICARE

## 2020-04-15 VITALS
HEART RATE: 68 BPM | SYSTOLIC BLOOD PRESSURE: 126 MMHG | BODY MASS INDEX: 29.89 KG/M2 | TEMPERATURE: 96.2 F | RESPIRATION RATE: 16 BRPM | HEIGHT: 66 IN | WEIGHT: 186 LBS | DIASTOLIC BLOOD PRESSURE: 74 MMHG

## 2020-04-15 PROCEDURE — 4040F PNEUMOC VAC/ADMIN/RCVD: CPT | Performed by: INTERNAL MEDICINE

## 2020-04-15 PROCEDURE — 1036F TOBACCO NON-USER: CPT | Performed by: INTERNAL MEDICINE

## 2020-04-15 PROCEDURE — G8427 DOCREV CUR MEDS BY ELIG CLIN: HCPCS | Performed by: INTERNAL MEDICINE

## 2020-04-15 PROCEDURE — 1123F ACP DISCUSS/DSCN MKR DOCD: CPT | Performed by: INTERNAL MEDICINE

## 2020-04-15 PROCEDURE — G8417 CALC BMI ABV UP PARAM F/U: HCPCS | Performed by: INTERNAL MEDICINE

## 2020-04-15 PROCEDURE — 99214 OFFICE O/P EST MOD 30 MIN: CPT | Performed by: INTERNAL MEDICINE

## 2020-04-15 NOTE — PROGRESS NOTES
tablet PLACE 1 TABLET UNDER THE TONGUE EVERY 5 MINUTES AS NEEDED FOR CHEST PAIN 25 tablet 2    vitamin B-1 (THIAMINE) 100 MG tablet Take 1 tablet by mouth daily 90 tablet 3     No current facility-administered medications for this visit. Allergies: Patient has no known allergies. Past Medical History:   Diagnosis Date    Atrial fibrillation ([address]     CAD (coronary artery disease)     S/P CABG x 4 6/2014    [facility]congest[facility] heart failure) ([facility])     Chronic back pain     Excessive daytime sleepiness 9/19/2018    H/O cardiovascular stress test 5/22/2014    EF 50%. There is evidence of mild to moderate ischemia in the mid inferolateral regions.  H/O diagnostic tests 01/27/2011    Aortic Duplex scan. Normal study.  H/O Doppler ultrasound 07/19/2017    Carotid-Moderate disease of the Bilateral internal carotid arteries. Calcific plaque noted throughout.  H/O echocardiogram 7/19/17,6/3/14    EF 55-60% Mild AS Aortic valve leaflets are somewhat thickened. Mildly enlarged right atrium size and dimension.  History of echocardiogram 12/14/2016    LV function is normal. Mild aortic Stenosis noted.  History of nuclear stress test 7/18/17, 7/31/2018    Normal LV function. Normal study. EF 45%.  Hx of echocardiogram 1/26/16    EF 40%, depressed LV function, Moderate LVH, moderate to severe aortic insufficiency and mild aortic stenosis.  Hyperlipidemia     Hypertension     Obstructive sleep apnea 10/29/2018     Past Surgical History:   Procedure Laterality Date    APPENDECTOMY      CHOLECYSTECTOMY      CORONARY ANGIOPLASTY WITH STENT PLACEMENT  2006    stent to the LAD and Circ    CORONARY ARTERY BYPASS GRAFT  6/4/14    CABG x4; LIMA to LAD & diag, SVG to CX marginal 1, SVG to CX marginal 2    DIAGNOSTIC CARDIAC CATH LAB PROCEDURE  03/07/2006    Normal LM, 60-70% ostial and prox LAD, 30% prox. 1st diag, 60% prox.  circumflex (co-dominant vessel and collateralizes RCA), 80% OM2, 100%

## 2020-05-08 ENCOUNTER — PROCEDURE VISIT (OUTPATIENT)
Dept: CARDIOLOGY CLINIC | Age: 85
End: 2020-05-08
Payer: MEDICARE

## 2020-05-08 LAB
LV EF: 38 %
LVEF MODALITY: NORMAL

## 2020-05-08 PROCEDURE — 93306 TTE W/DOPPLER COMPLETE: CPT | Performed by: INTERNAL MEDICINE

## 2020-05-12 ENCOUNTER — TELEPHONE (OUTPATIENT)
Dept: CARDIOLOGY CLINIC | Age: 85
End: 2020-05-12

## 2020-05-13 ENCOUNTER — HOSPITAL ENCOUNTER (OUTPATIENT)
Age: 85
Setting detail: SPECIMEN
Discharge: HOME | End: 2020-05-13
Payer: MEDICARE

## 2020-05-13 ENCOUNTER — OFFICE VISIT (OUTPATIENT)
Dept: PRIMARY CARE CLINIC | Age: 85
End: 2020-05-13
Payer: MEDICARE

## 2020-05-13 ENCOUNTER — TELEPHONE (OUTPATIENT)
Dept: CARDIOLOGY CLINIC | Age: 85
End: 2020-05-13

## 2020-05-13 PROCEDURE — 1036F TOBACCO NON-USER: CPT | Performed by: FAMILY MEDICINE

## 2020-05-13 PROCEDURE — G8417 CALC BMI ABV UP PARAM F/U: HCPCS | Performed by: FAMILY MEDICINE

## 2020-05-13 PROCEDURE — 4040F PNEUMOC VAC/ADMIN/RCVD: CPT | Performed by: FAMILY MEDICINE

## 2020-05-13 PROCEDURE — 99213 OFFICE O/P EST LOW 20 MIN: CPT | Performed by: FAMILY MEDICINE

## 2020-05-13 PROCEDURE — G8428 CUR MEDS NOT DOCUMENT: HCPCS | Performed by: FAMILY MEDICINE

## 2020-05-13 PROCEDURE — U0002 COVID-19 LAB TEST NON-CDC: HCPCS

## 2020-05-13 PROCEDURE — 1123F ACP DISCUSS/DSCN MKR DOCD: CPT | Performed by: FAMILY MEDICINE

## 2020-05-13 RX ORDER — ALBUTEROL SULFATE 90 UG/1
2 AEROSOL, METERED RESPIRATORY (INHALATION) 4 TIMES DAILY PRN
Qty: 1 INHALER | Refills: 5 | Status: SHIPPED | OUTPATIENT
Start: 2020-05-13 | End: 2020-09-11 | Stop reason: SDUPTHER

## 2020-05-13 RX ORDER — AZITHROMYCIN 250 MG/1
250 TABLET, FILM COATED ORAL SEE ADMIN INSTRUCTIONS
Qty: 6 TABLET | Refills: 0 | Status: SHIPPED | OUTPATIENT
Start: 2020-05-13 | End: 2020-05-18 | Stop reason: ALTCHOICE

## 2020-05-13 NOTE — PATIENT INSTRUCTIONS
- Based on HPI and examination, you have viral or COVID-19  infection.  - Your covid-19 testing result takes 3-4 days to come back. [address] will notify the test result if it is POSITIVE. In case of NEGATIVE result, our office will notify you. You can also view the result through  [facility] [address]. - Fluid hydration with plain water was advised. Mix Gatorade with Pedialyte. - Antibiotics (Z-JOCY) , bronchodilators for shortness of breath( Albuterol) , - OTC cough medication, Zarbees with dark honey was suggested. - Stay at home and self quarantine until COVID-19 test result is available. If your COVID-19 test result is POSITIVE, 14 day self quarantine is recommended. If the test results is NEGATIVE, you can end your quarantine and return to your normal routine or work. - Please return to clinic or call us if symptoms are worsened.

## 2020-05-13 NOTE — PROGRESS NOTES
5/14/20  Filomena Evans  1935     Retired     FLU/COVID-19 CLINIC EVALUATION    HPI SYMPTOMS: Patient presented with about 5-7-day history of fever cough and shortness of breath. Patient symptoms are getting worse with progressive shortness of breath. Patient no longer has fever or chills. Patient also feels lethargic. Patient denies having any chest tightness or chest pain. [x] Fevers  [] Chills  [] Cough  [] Coughing up blood  [] Chest Congestion  [] Nasal Congestion  [x] Feeling short of breath  [] Sometimes  [] Frequently  [x] All the time  [x] Chest pain  [] Headaches  []Tolerable  [] Severe  [] Sore throat  [] Muscle aches  [] Nausea  [] Vomiting  []Unable to keep fluids down  [] Diarrhea  []Severe    [x] OTHER SYMPTOMS: weakness      Symptom Duration:   [x] 1  [] 2   [] 3   [] 4    [] 5   [] 6   [] 7   [] 8   [] 9   [] 10   [] 11   [] 12   [] 13   [] 14   [] Longer than 14 days    Symptom course:   [x] Worsening     [] Stable     [] Improving    RISK FACTORS:    [] Pregnant or possibly pregnant  [x] Age over 61  [] Diabetes  [] Heart disease  [] Asthma  [] COPD/Other chronic lung diseases  [] Active Cancer  [] On Chemotherapy  [] Taking oral steroids  [] History Lymphoma/Leukemia  [] Close contact with a lab confirmed COVID-19 patient within 14 days of symptom onset  [] History of travel from affected geographical areas within 14 days of symptom onset       VITALS:  Vitals:    05/13/20 1647 05/14/20 1225   Pulse: 82 82   Temp: 98 °F (36.7 °C) 97.8 °F (36.6 °C)   SpO2: 97% 98%    Physical Exam   Constitutional: He is oriented to person, place, and time. He appears well-nourished. Patient is in wheelchair. HENT:   Head: Normocephalic. Mouth/Throat: Oropharynx is clear and moist. No oropharyngeal exudate. Eyes: Pupils are equal, round, and reactive to light. Neck: Normal range of motion. Cardiovascular: Normal rate, regular rhythm and normal heart sounds.    Pulmonary/Chest: Effort normal. Mild bilateral fine coarse sounds   Abdominal: Soft. Lymphadenopathy:     He has no cervical adenopathy. Neurological: He is alert and oriented to person, place, and time. Skin: Skin is warm and dry. Psychiatric: He has a normal mood and affect. His behavior is normal. Judgment and thought content normal.         TESTS:    POCT FLU:  [] Positive     []Negative    ASSESSMENT:    [] Flu  [x] Possible COVID-19  [] Strep    PLAN:  - Based on HPI and examination, you have viral or COVID-19  infection.  - Your covid-19 testing result takes 3-4 days to come back. [address] will notify the test result if it is POSITIVE. In case of NEGATIVE result, our office will notify you. You can also view the result through  [facility] [address]. - Fluid hydration with plain water was advised. Mix Gatorade with Pedialyte. - Antibiotics (Z-JOCY) , bronchodilators for shortness of breath( Albuterol) , - OTC cough medication, Zarbees with dark honey was suggested. - Stay at home and self quarantine until COVID-19 test result is available. If your COVID-19 test result is POSITIVE, 14 day self quarantine is recommended. If the test results is NEGATIVE, you can end your quarantine and return to your normal routine or work. - Please return to clinic or call us if symptoms are worsened. [] Discharge home with written instructions for:  [x] Flu management  [] Possible COVID-19 infection with self-quarantine and management of symptoms  [x] Follow-up with primary care physician or emergency department if worsens  [] Evaluation per physician/nurse practitioner in clinic  [] Sent to ER       An  electronic signature was used to authenticate this note.      --Tani Trujillo,  on 5/14/2020 at 12:25 PM

## 2020-05-14 VITALS — HEART RATE: 82 BPM | TEMPERATURE: 97.8 F | OXYGEN SATURATION: 98 %

## 2020-05-15 LAB
SARS-COV-2: NOT DETECTED
SOURCE: NORMAL

## 2020-05-18 ENCOUNTER — HOSPITAL ENCOUNTER (INPATIENT)
Age: 85
LOS: 3 days | Discharge: NURSING, REHAB OR LONG-TERM CARE | DRG: 183 | End: 2020-05-21
Attending: INTERNAL MEDICINE | Admitting: INTERNAL MEDICINE
Payer: MEDICARE

## 2020-05-18 ENCOUNTER — APPOINTMENT (OUTPATIENT)
Dept: CT IMAGING | Age: 85
End: 2020-05-18
Payer: MEDICARE

## 2020-05-18 ENCOUNTER — HOSPITAL ENCOUNTER (EMERGENCY)
Age: 85
Discharge: ANOTHER HOSPITAL | End: 2020-05-18
Attending: EMERGENCY MEDICINE
Payer: MEDICARE

## 2020-05-18 VITALS
SYSTOLIC BLOOD PRESSURE: 109 MMHG | BODY MASS INDEX: 29.89 KG/M2 | HEART RATE: 70 BPM | DIASTOLIC BLOOD PRESSURE: 85 MMHG | OXYGEN SATURATION: 96 % | RESPIRATION RATE: 18 BRPM | WEIGHT: 186 LBS | HEIGHT: 66 IN | TEMPERATURE: 97.5 F

## 2020-05-18 PROBLEM — S22.32XG CLOSED FRACTURE OF ONE RIB OF LEFT SIDE WITH DELAYED HEALING: Status: ACTIVE | Noted: 2020-05-18

## 2020-05-18 PROBLEM — S22.49XS: Status: ACTIVE | Noted: 2020-05-18

## 2020-05-18 LAB
ANION GAP SERPL CALCULATED.3IONS-SCNC: 16 MMOL/L (ref 4–16)
BANDED NEUTROPHILS ABSOLUTE COUNT: 0.39 K/CU MM
BANDED NEUTROPHILS RELATIVE PERCENT: 3 % (ref 5–11)
BUN BLDV-MCNC: 29 MG/DL (ref 6–23)
CALCIUM SERPL-MCNC: 8.8 MG/DL (ref 8.3–10.6)
CHLORIDE BLD-SCNC: 99 MMOL/L (ref 99–110)
CO2: 21 MMOL/L (ref 21–32)
CREAT SERPL-MCNC: 1.7 MG/DL (ref 0.9–1.3)
DIFFERENTIAL TYPE: ABNORMAL
ELLIPTOCYTES: ABNORMAL
EOSINOPHILS ABSOLUTE: 0.4 K/CU MM
EOSINOPHILS RELATIVE PERCENT: 3 % (ref 0–3)
GFR AFRICAN AMERICAN: 47 ML/MIN/1.73M2
GFR NON-AFRICAN AMERICAN: 39 ML/MIN/1.73M2
GLUCOSE BLD-MCNC: 122 MG/DL (ref 70–99)
HCT VFR BLD CALC: 32.9 % (ref 42–52)
HEMOGLOBIN: 10.3 GM/DL (ref 13.5–18)
LYMPHOCYTES ABSOLUTE: 1.9 K/CU MM
LYMPHOCYTES RELATIVE PERCENT: 15 % (ref 24–44)
MCH RBC QN AUTO: 33.7 PG (ref 27–31)
MCHC RBC AUTO-ENTMCNC: 31.3 % (ref 32–36)
MCV RBC AUTO: 107.5 FL (ref 78–100)
METAMYELOCYTES ABSOLUTE COUNT: 0.13 K/CU MM
METAMYELOCYTES PERCENT: 1 %
MONOCYTES ABSOLUTE: 1.4 K/CU MM
MONOCYTES RELATIVE PERCENT: 11 % (ref 0–4)
PDW BLD-RTO: 14.2 % (ref 11.7–14.9)
PLATELET # BLD: 203 K/CU MM (ref 140–440)
PMV BLD AUTO: 9.3 FL (ref 7.5–11.1)
POTASSIUM SERPL-SCNC: 4.9 MMOL/L (ref 3.5–5.1)
RBC # BLD: 3.06 M/CU MM (ref 4.6–6.2)
RETICULOCYTE COUNT PCT: 2.4 % (ref 0.2–2.2)
SEGMENTED NEUTROPHILS ABSOLUTE COUNT: 8.7 K/CU MM
SEGMENTED NEUTROPHILS RELATIVE PERCENT: 67 % (ref 36–66)
SODIUM BLD-SCNC: 136 MMOL/L (ref 135–145)
WBC # BLD: 12.9 K/CU MM (ref 4–10.5)

## 2020-05-18 PROCEDURE — 93005 ELECTROCARDIOGRAM TRACING: CPT | Performed by: EMERGENCY MEDICINE

## 2020-05-18 PROCEDURE — 6370000000 HC RX 637 (ALT 250 FOR IP): Performed by: INTERNAL MEDICINE

## 2020-05-18 PROCEDURE — 85007 BL SMEAR W/DIFF WBC COUNT: CPT

## 2020-05-18 PROCEDURE — 96374 THER/PROPH/DIAG INJ IV PUSH: CPT

## 2020-05-18 PROCEDURE — 85045 AUTOMATED RETICULOCYTE COUNT: CPT

## 2020-05-18 PROCEDURE — 99291 CRITICAL CARE FIRST HOUR: CPT

## 2020-05-18 PROCEDURE — 96375 TX/PRO/DX INJ NEW DRUG ADDON: CPT

## 2020-05-18 PROCEDURE — 94761 N-INVAS EAR/PLS OXIMETRY MLT: CPT

## 2020-05-18 PROCEDURE — 6360000002 HC RX W HCPCS: Performed by: INTERNAL MEDICINE

## 2020-05-18 PROCEDURE — 6360000002 HC RX W HCPCS: Performed by: EMERGENCY MEDICINE

## 2020-05-18 PROCEDURE — 85027 COMPLETE CBC AUTOMATED: CPT

## 2020-05-18 PROCEDURE — 2700000000 HC OXYGEN THERAPY PER DAY

## 2020-05-18 PROCEDURE — 93010 ELECTROCARDIOGRAM REPORT: CPT | Performed by: INTERNAL MEDICINE

## 2020-05-18 PROCEDURE — 71250 CT THORAX DX C-: CPT

## 2020-05-18 PROCEDURE — 80048 BASIC METABOLIC PNL TOTAL CA: CPT

## 2020-05-18 PROCEDURE — 96361 HYDRATE IV INFUSION ADD-ON: CPT

## 2020-05-18 PROCEDURE — G0378 HOSPITAL OBSERVATION PER HR: HCPCS

## 2020-05-18 PROCEDURE — 2580000003 HC RX 258: Performed by: EMERGENCY MEDICINE

## 2020-05-18 PROCEDURE — 94640 AIRWAY INHALATION TREATMENT: CPT

## 2020-05-18 PROCEDURE — 2580000003 HC RX 258: Performed by: INTERNAL MEDICINE

## 2020-05-18 RX ORDER — SODIUM CHLORIDE 0.9 % (FLUSH) 0.9 %
10 SYRINGE (ML) INJECTION EVERY 12 HOURS SCHEDULED
Status: DISCONTINUED | OUTPATIENT
Start: 2020-05-18 | End: 2020-05-21 | Stop reason: HOSPADM

## 2020-05-18 RX ORDER — POLYETHYLENE GLYCOL 3350 17 G/17G
17 POWDER, FOR SOLUTION ORAL DAILY PRN
Status: DISCONTINUED | OUTPATIENT
Start: 2020-05-18 | End: 2020-05-21 | Stop reason: HOSPADM

## 2020-05-18 RX ORDER — 0.9 % SODIUM CHLORIDE 0.9 %
500 INTRAVENOUS SOLUTION INTRAVENOUS ONCE
Status: COMPLETED | OUTPATIENT
Start: 2020-05-18 | End: 2020-05-18

## 2020-05-18 RX ORDER — CLOPIDOGREL BISULFATE 75 MG/1
75 TABLET ORAL DAILY
Status: DISCONTINUED | OUTPATIENT
Start: 2020-05-19 | End: 2020-05-21 | Stop reason: HOSPADM

## 2020-05-18 RX ORDER — PROMETHAZINE HYDROCHLORIDE 25 MG/1
12.5 TABLET ORAL EVERY 6 HOURS PRN
Status: DISCONTINUED | OUTPATIENT
Start: 2020-05-18 | End: 2020-05-21 | Stop reason: HOSPADM

## 2020-05-18 RX ORDER — RANOLAZINE 500 MG/1
1000 TABLET, EXTENDED RELEASE ORAL 2 TIMES DAILY
Status: DISCONTINUED | OUTPATIENT
Start: 2020-05-18 | End: 2020-05-21 | Stop reason: HOSPADM

## 2020-05-18 RX ORDER — ALBUTEROL SULFATE 2.5 MG/3ML
2.5 SOLUTION RESPIRATORY (INHALATION) 4 TIMES DAILY
Status: DISCONTINUED | OUTPATIENT
Start: 2020-05-18 | End: 2020-05-21 | Stop reason: HOSPADM

## 2020-05-18 RX ORDER — OXYCODONE HYDROCHLORIDE AND ACETAMINOPHEN 5; 325 MG/1; MG/1
1 TABLET ORAL EVERY 4 HOURS PRN
Status: DISCONTINUED | OUTPATIENT
Start: 2020-05-18 | End: 2020-05-21 | Stop reason: HOSPADM

## 2020-05-18 RX ORDER — MORPHINE SULFATE 4 MG/ML
4 INJECTION, SOLUTION INTRAMUSCULAR; INTRAVENOUS ONCE
Status: COMPLETED | OUTPATIENT
Start: 2020-05-18 | End: 2020-05-18

## 2020-05-18 RX ORDER — MORPHINE SULFATE 2 MG/ML
1 INJECTION, SOLUTION INTRAMUSCULAR; INTRAVENOUS EVERY 4 HOURS PRN
Status: DISCONTINUED | OUTPATIENT
Start: 2020-05-18 | End: 2020-05-21 | Stop reason: HOSPADM

## 2020-05-18 RX ORDER — ONDANSETRON 2 MG/ML
4 INJECTION INTRAMUSCULAR; INTRAVENOUS EVERY 6 HOURS PRN
Status: DISCONTINUED | OUTPATIENT
Start: 2020-05-18 | End: 2020-05-18 | Stop reason: ALTCHOICE

## 2020-05-18 RX ORDER — SIMVASTATIN 20 MG
20 TABLET ORAL NIGHTLY
Status: DISCONTINUED | OUTPATIENT
Start: 2020-05-18 | End: 2020-05-21 | Stop reason: HOSPADM

## 2020-05-18 RX ORDER — THIAMINE MONONITRATE (VIT B1) 100 MG
100 TABLET ORAL DAILY
Status: DISCONTINUED | OUTPATIENT
Start: 2020-05-18 | End: 2020-05-21 | Stop reason: HOSPADM

## 2020-05-18 RX ORDER — SERTRALINE HYDROCHLORIDE 100 MG/1
100 TABLET, FILM COATED ORAL DAILY
Status: DISCONTINUED | OUTPATIENT
Start: 2020-05-19 | End: 2020-05-21 | Stop reason: HOSPADM

## 2020-05-18 RX ORDER — LISINOPRIL 2.5 MG/1
2.5 TABLET ORAL DAILY
Status: DISCONTINUED | OUTPATIENT
Start: 2020-05-19 | End: 2020-05-19

## 2020-05-18 RX ORDER — SODIUM CHLORIDE 0.9 % (FLUSH) 0.9 %
10 SYRINGE (ML) INJECTION PRN
Status: DISCONTINUED | OUTPATIENT
Start: 2020-05-18 | End: 2020-05-21 | Stop reason: HOSPADM

## 2020-05-18 RX ORDER — ACETAMINOPHEN 650 MG/1
650 SUPPOSITORY RECTAL EVERY 6 HOURS PRN
Status: DISCONTINUED | OUTPATIENT
Start: 2020-05-18 | End: 2020-05-21 | Stop reason: HOSPADM

## 2020-05-18 RX ORDER — ONDANSETRON 2 MG/ML
4 INJECTION INTRAMUSCULAR; INTRAVENOUS ONCE
Status: COMPLETED | OUTPATIENT
Start: 2020-05-18 | End: 2020-05-18

## 2020-05-18 RX ORDER — ACETAMINOPHEN 325 MG/1
650 TABLET ORAL EVERY 6 HOURS PRN
Status: DISCONTINUED | OUTPATIENT
Start: 2020-05-18 | End: 2020-05-21 | Stop reason: HOSPADM

## 2020-05-18 RX ADMIN — OXYCODONE HYDROCHLORIDE AND ACETAMINOPHEN 1 TABLET: 5; 325 TABLET ORAL at 22:44

## 2020-05-18 RX ADMIN — MORPHINE SULFATE 1 MG: 2 INJECTION, SOLUTION INTRAMUSCULAR; INTRAVENOUS at 19:22

## 2020-05-18 RX ADMIN — RANOLAZINE 1000 MG: 500 TABLET, FILM COATED, EXTENDED RELEASE ORAL at 22:44

## 2020-05-18 RX ADMIN — ONDANSETRON 4 MG: 2 INJECTION INTRAMUSCULAR; INTRAVENOUS at 10:52

## 2020-05-18 RX ADMIN — ALBUTEROL SULFATE 2.5 MG: 2.5 SOLUTION RESPIRATORY (INHALATION) at 20:15

## 2020-05-18 RX ADMIN — SODIUM CHLORIDE, PRESERVATIVE FREE 10 ML: 5 INJECTION INTRAVENOUS at 22:44

## 2020-05-18 RX ADMIN — SODIUM CHLORIDE 500 ML: 9 INJECTION, SOLUTION INTRAVENOUS at 12:08

## 2020-05-18 RX ADMIN — Medication 100 MG: at 22:44

## 2020-05-18 RX ADMIN — SIMVASTATIN 20 MG: 20 TABLET, FILM COATED ORAL at 22:44

## 2020-05-18 RX ADMIN — MORPHINE SULFATE 4 MG: 4 INJECTION, SOLUTION INTRAMUSCULAR; INTRAVENOUS at 10:54

## 2020-05-18 ASSESSMENT — PAIN SCALES - GENERAL
PAINLEVEL_OUTOF10: 8
PAINLEVEL_OUTOF10: 10
PAINLEVEL_OUTOF10: 8
PAINLEVEL_OUTOF10: 10
PAINLEVEL_OUTOF10: 8

## 2020-05-18 ASSESSMENT — PAIN DESCRIPTION - ORIENTATION
ORIENTATION: LEFT

## 2020-05-18 ASSESSMENT — PAIN DESCRIPTION - DESCRIPTORS
DESCRIPTORS: ACHING;CONSTANT;SHARP
DESCRIPTORS: SHARP;STABBING
DESCRIPTORS: SHARP;STABBING

## 2020-05-18 ASSESSMENT — PAIN DESCRIPTION - FREQUENCY
FREQUENCY: INTERMITTENT
FREQUENCY: CONTINUOUS
FREQUENCY: INTERMITTENT

## 2020-05-18 ASSESSMENT — PAIN DESCRIPTION - PROGRESSION: CLINICAL_PROGRESSION: GRADUALLY WORSENING

## 2020-05-18 ASSESSMENT — ENCOUNTER SYMPTOMS
GASTROINTESTINAL NEGATIVE: 1
RESPIRATORY NEGATIVE: 1

## 2020-05-18 ASSESSMENT — PAIN DESCRIPTION - ONSET: ONSET: SUDDEN

## 2020-05-18 ASSESSMENT — PAIN DESCRIPTION - LOCATION
LOCATION: RIB CAGE
LOCATION: RIB CAGE
LOCATION: CHEST

## 2020-05-18 ASSESSMENT — PAIN DESCRIPTION - PAIN TYPE
TYPE: ACUTE PAIN

## 2020-05-18 NOTE — PROGRESS NOTES
Admission skin assessment done with [facility] LPN. Pt skin is intact but he does have a bruise from his fall on his left mid back.

## 2020-05-18 NOTE — H&P
History and Physical  Internal Medicine Hospitalist      Name:  Juliocesar Randhawa /Age/Sex: 1935  (80 y.o. male)   MRN & CSN:  [id number] & [id number] Admission Date/Time: 2020  5:34 PM   Location:  [address] [place] [address] PCP: Alexandra Ortega MD       Hospital Day: 1          Chief Complaint:  Left chest wall pain X 4 days   Assessment and Plan:   Juliocesar Randhawa is a 80 y.o.  male who presents with above c/o    --- Non displaced closed acute anterior left 4th to 7th rib fractures following a mechanical fall at home. --- Wheeze   Plan  Pain control with PRN percocet and morphine  Incentive spirometry  CT surgery consult  Monitor respiratory status closely. PT/OT   PRN albuterol neb    Other diagnoses  CAD s/p 4V CABG - clopidogrel, ranexa  HTN - lisinopril  HLD - lipitor  Depression - Zoloft. ALMA - not yet on CPAP. A fib - not on chronic AC due to fall risk. Current diagnosis and plan of management discussed with the patient family at the time of admission in lay language who agree to the above plan and disposition of admission for further care. All concerns and questions addressed. Diet DIET CARDIAC;    DVT Prophylaxis [x] Lovenox, []  Heparin, [] SCDs, [] Ambulation  [] Long term AC   GI Prophylaxis [] PPI,  [] H2 Blocker,  [] Carafate,  [] Diet,  [x] No GI prophylaxis, N/A: patient is not under significant medical stress, non-ICU or is receiving a diet/tube feeds   Code Status DNR-CCA   Disposition To be determined   MDM [] Low, [x] Moderate,[]  High     History of Present Illness:     Principal Problem:  Left chest wall pain X 4 days. Juliocesar Randhawa is a 80 y.o. male who presents to the [facility] ED with above complaints. He reports falling at home on 20. Says his very prone to falling and on that day legs gave way while walking up stairs and he fell and landed on the left side of his body - hitting left side of face and left rib cage. He denies dizziness.  He started experiencing worsening SOB with deep inspiration and left sided chest pain hence his presentation to the ED today. He denies hemoptysis. ED Course: Discussed case with ED physician prior to admission. ROS: As per HPI, otherwise 10-systems reviewed negative, unless as noted above. Objective:   No intake or output data in the 24 hours ending 05/18/20 1826     Vitals: There were no vitals filed for this visit. Physical Exam: 05/18/20    GEN  - Elderly  male, sitting up in bed, in moderate painful distress when he moves. EYES -Pupils are equally round. No vision changes. No scleral erythema, discharge, or conjunctivitis. HENT -MM are moist. Oral pharynx without exudates, no evidence of thrush. NECK -Supple, no apparent thyromegaly or masses. RESP - Audibly wheezing, in moderate respiratory distress with deep breaths, on supplemental oxygen 2 LPM via NC.    C/V  -S1/S2 auscultated. RRR without appreciable M/R/G. No JVD or carotid bruits. Peripheral pulses equal bilaterally and palpable. No peripheral edema. GI  - Abdomen is soft non-distended, no significant tenderness. No masses or guarding. + BS in all quadrants.   -No CVA tenderness. Keith catheter is present. MS  -B/L extremities strong muscles strength. Full movements. No gross joint deformities. No swelling, intact sensation symmetrical.   SKIN  - Normal coloration, warm, dry. No open wounds or ulcers. NEURO  -normal speech, no lateralizing weakness. [address]  -Awake, alert, oriented x 3    Past Medical History:      Past Medical History:   Diagnosis Date    Atrial fibrillation ([facility] [address]     CAD (coronary artery disease)     S/P CABG x 4 6/2014    [facility]estive [facility] failure[facility]     Chronic back pain     Excessive daytime sleepiness 9/19/2018    H/O cardiovascular stress test 5/22/2014    EF 50%. There is evidence of mild to moderate ischemia in the mid inferolateral regions.  H/O diagnostic tests 01/27/2011    Aortic Duplex scan. since quittin.1    Smokeless tobacco: Never Used   Substance and Sexual Activity    Alcohol use: No    Drug use: No    Sexual activity: Yes     Partners: Female     Comment:    Lifestyle    Physical activity     Days per week: Not on file     Minutes per session: Not on file    Stress: Not on file   Relationships    Social connections     Talks on phone: Not on file     Gets together: Not on file     Attends [employer] service: Not on file     Active member of club or organization: Not on file     Attends meetings of clubs or organizations: Not on file     Relationship status: Not on file    Intimate partner violence     Fear of current or ex partner: Not on file     Emotionally abused: Not on file     Physically abused: Not on file     Forced sexual activity: Not on file   Other Topics Concern    Not on file   Social History Narrative    Not on file     TOBACCO:   reports that he quit smoking about 46 years ago. He has a 25.00 pack-year smoking history. He has never used smokeless tobacco.  ETOH:   reports no history of alcohol use. Drugs:  reports no history of drug use. Allergies: No Known Allergies  Medications:   Medications:    Infusions:   PRN Meds:   Prior to Admission Meds:  Prior to Admission medications    Medication Sig Start Date End Date Taking?  Authorizing Provider   albuterol sulfate  (90 Base) MCG/ACT inhaler Inhale 2 puffs into the lungs 4 times daily as needed for Wheezing 20   Tani Alas,    clopidogrel (PLAVIX) 75 MG tablet TAKE 1 TABLET DAILY 2/3/20   Robyn Hanna MD   sertraline (ZOLOFT) 100 MG tablet TAKE 1 TABLET DAILY 20   Robyn Hanna MD   metoprolol tartrate (LOPRESSOR) 25 MG tablet Take 0.5 tablets by mouth 2 times daily 19   Robyn Hanna MD   ranolazine (RANEXA) 1000 MG extended release tablet Take 1 tablet by mouth 2 times daily 19  Robyn Hanna MD   lisinopril (PRINIVIL;ZESTRIL) 5 MG tablet TAKE 1 TABLET DAILY  Patient taking differently: 2.5 mg TAKE 1 TABLET DAILY 9/18/19   Shira Clement MD   ezetimibe-simvastatin (VYTORIN) 10-20 MG per tablet TAKE 1 TABLET NIGHTLY 6/20/19   Shira Clement MD   acetaminophen (TYLENOL) 500 MG tablet Take 1,000 mg by mouth every 6 hours as needed for Pain    Historical Provider, MD   sildenafil (VIAGRA) 50 MG tablet Take 1 tablet by mouth as needed for Erectile Dysfunction 6/12/19   Juice Fulton MD   nitroGLYCERIN (NITROSTAT) 0.4 MG SL tablet PLACE 1 TABLET UNDER THE TONGUE EVERY 5 MINUTES AS NEEDED FOR CHEST PAIN 5/2/18   Juice Fulton MD   vitamin B-1 (THIAMINE) 100 MG tablet Take 1 tablet by mouth daily 1/26/16   Shira Clement MD     Data:     Laboratory this visit:  Reviewed  Recent Labs     05/18/20  1045   WBC 12.9*   HGB 10.3*   HCT 32.9*         Recent Labs     05/18/20  1045      K 4.9   CL 99   CO2 21   BUN 29*   CREATININE 1.7*     No results for input(s): AST, ALT, ALB, BILIDIR, BILITOT, ALKPHOS in the last 72 hours. No results for input(s): INR in the last 72 hours. No results for input(s): CKTOTAL, CKMB, CKMBINDEX in the last 72 hours. Invalid input(s): Mayra Cargo input(s): PRO-BNP    Radiology this visit:  Reviewed. Ct Chest Wo Contrast    Result Date: 5/18/2020  EXAMINATION: CT OF THE CHEST WITHOUT CONTRAST 5/18/2020 11:42 am TECHNIQUE: CT of the chest was performed without the administration of intravenous contrast. Multiplanar reformatted images are provided for review. Dose modulation, iterative reconstruction, and/or weight based adjustment of the mA/kV was utilized to reduce the radiation dose to as low as reasonably achievable.  COMPARISON: Chest and right ribs radiograph 03/14/2019, thoracic spine MRI 10/24/2018, chest CT 06/03/2014 HISTORY: ORDERING SYSTEM PROVIDED HISTORY: Fall: L side rib/ sternal pain TECHNOLOGIST PROVIDED HISTORY: Reason for exam:->Fall: L side rib/ sternal pain Reason for musculature. Median sternotomy with intact wires, nonunion of the manubrium, and complete bony union of the sternal body. Diffuse osseous demineralization consistent with osteoporosis given chronic compression deformities. Acute nondisplaced to minimally displaced fractures of the anterior left 4th through 7th ribs. Healing mildly displaced fracture of the posterior right 11th rib with callus formation but no definite bony union. Remote fractures of multiple anterior right ribs, potentially sequelae of insufficiency. 1. Acute nondisplaced to minimally displaced fractures of the anterior left 4th through 7th ribs. Grade 2 chest wall injury. 2. Peribronchovascular groundglass in each lung spares the right middle lobe and is most prominent in the left upper lobe. Contusion is a consideration in the left upper lobe with the appearance otherwise suggestive of an infectious or inflammatory process such as bronchiolitis. Some interstitial fibrotic processes could appear similar. Possible grade 1 lung injury. 3. Additional incidental findings as above. EKG this visit:   EKG: Reviewed.     Current Treatment Team:  Treatment Team: Attending Provider: Josefina Lancaster MD; Consulting Physician: MD Josefina Velasquez [address], [place] [facility]  5/18/2020 6:26 PM      Electronically signed by Josefina Lancaster MD on 5/18/2020 at 6:26 PM

## 2020-05-18 NOTE — ED NOTES
Resting with eyes closed after pain med given. Monitor and alarms on.  siderails up x 2.   Call bell w/i reach     David Sanchez RN  05/18/20 5416

## 2020-05-18 NOTE — ED TRIAGE NOTES
To ED per wife. C/o left sided chest pain that radiates to lower rib cage area left side of back. Ecchymosis noted to this area on back. States that chest pain and shortness of breath increases with movement.

## 2020-05-18 NOTE — ED PROVIDER NOTES
atrium,mild aortic stenosis,mild aortic regurg,moderate mitral and tricuspid regurg    Hx of echocardiogram 16    EF 40%, depressed LV function, Moderate LVH, moderate to severe aortic insufficiency and mild aortic stenosis.  Hyperlipidemia     Hypertension     Obstructive sleep apnea 10/29/2018     Past Surgical History:   Procedure Laterality Date    APPENDECTOMY      CHOLECYSTECTOMY      CORONARY ANGIOPLASTY WITH STENT PLACEMENT      stent to the LAD and Circ    CORONARY ARTERY BYPASS GRAFT  14    CABG x4; LIMA to LAD & diag, SVG to CX marginal 1, SVG to CX marginal 2    DIAGNOSTIC CARDIAC CATH LAB PROCEDURE  2006    Normal LM, 60-70% ostial and prox LAD, 30% prox. 1st diag, 60% prox.  circumflex (co-dominant vessel and collateralizes RCA), 80% OM2, 100% occluded RCA, very small PDA that fills by collaterals from CX, small abd aortic aneurysm, mild MR, normal aortic arch and origin of great vessels    EYE SURGERY Right     Cataract Extraction    EYE SURGERY Left 2014    Cataract Extraction    HERNIA REPAIR      Umbilical    TONSILLECTOMY Bilateral      Family History   Problem Relation Age of Onset    Heart Disease Father      Social History     Socioeconomic History    Marital status:      Spouse name: Not on file    Number of children: Not on file    Years of education: Not on file    Highest education level: Not on file   Occupational History    Not on file   Social Needs    Financial resource strain: Not on file    Food insecurity     Worry: Not on file     Inability: Not on file   [employer] needs     Medical: Not on file     Non-medical: Not on file   Tobacco Use    Smoking status: Former Smoker     Packs/day: 1.00     Years: 25.00     Pack years: 25.00     Last attempt to quit: 1974     Years since quittin.1    Smokeless tobacco: Never Used   Substance and Sexual Activity    Alcohol use: No    Drug use: No    Sexual activity: Yes Partners: Female     Comment:    Lifestyle    Physical activity     Days per week: Not on file     Minutes per session: Not on file    Stress: Not on file   Relationships    Social connections     Talks on phone: Not on file     Gets together: Not on file     Attends Restorationism service: Not on file     Active member of club or organization: Not on file     Attends meetings of clubs or organizations: Not on file     Relationship status: Not on file    Intimate partner violence     Fear of current or ex partner: Not on file     Emotionally abused: Not on file     Physically abused: Not on file     Forced sexual activity: Not on file   Other Topics Concern    Not on file   Social History Narrative    Not on file     No current facility-administered medications for this encounter.       Current Outpatient Medications   Medication Sig Dispense Refill    albuterol sulfate  (90 Base) MCG/ACT inhaler Inhale 2 puffs into the lungs 4 times daily as needed for Wheezing 1 Inhaler 5    clopidogrel (PLAVIX) 75 MG tablet TAKE 1 TABLET DAILY 30 tablet 5    sertraline (ZOLOFT) 100 MG tablet TAKE 1 TABLET DAILY 90 tablet 3    metoprolol tartrate (LOPRESSOR) 25 MG tablet Take 0.5 tablets by mouth 2 times daily 180 tablet 3    ranolazine (RANEXA) 1000 MG extended release tablet Take 1 tablet by mouth 2 times daily 180 tablet 3    lisinopril (PRINIVIL;ZESTRIL) 5 MG tablet TAKE 1 TABLET DAILY (Patient taking differently: 2.5 mg TAKE 1 TABLET DAILY) 90 tablet 3    ezetimibe-simvastatin (VYTORIN) 10-20 MG per tablet TAKE 1 TABLET NIGHTLY 90 tablet 3    acetaminophen (TYLENOL) 500 MG tablet Take 1,000 mg by mouth every 6 hours as needed for Pain      sildenafil (VIAGRA) 50 MG tablet Take 1 tablet by mouth as needed for Erectile Dysfunction 30 tablet 3    nitroGLYCERIN (NITROSTAT) 0.4 MG SL tablet PLACE 1 TABLET UNDER THE TONGUE EVERY 5 MINUTES AS NEEDED FOR CHEST PAIN 25 tablet 2    vitamin B-1 (THIAMINE) 100 MG tablet Take 1 tablet by mouth daily 90 tablet 3     No Known Allergies      ROS:    Review of Systems   Respiratory: Negative. Cardiovascular: Negative. Gastrointestinal: Negative. Musculoskeletal:        Chest wall midsternal pain left-sided rib pain   Hematological: Bruises/bleeds easily. All other systems reviewed and are negative. Nursing Notes Reviewed    Physical Exam:  ED Triage Vitals   Enc Vitals Group      BP       Pulse       Resp       Temp       Temp src       SpO2       Weight       Height       Head Circumference       Peak Flow       Pain Score       Pain Loc       Pain Edu? Excl. in [address]? Physical Exam  Vitals signs and nursing note reviewed. Exam conducted with a chaperone present. Constitutional:       General: He is in acute distress. Appearance: He is well-developed. He is obese. He is ill-appearing. HENT:      Head: Normocephalic and atraumatic. Right Ear: External ear normal.      Left Ear: External ear normal.      Mouth/Throat:      Mouth: Mucous membranes are moist.   Eyes:      General: No scleral icterus. Right eye: No discharge. Left eye: No discharge. Conjunctiva/sclera: Conjunctivae normal.      Pupils: Pupils are equal, round, and reactive to light. Neck:      Musculoskeletal: Normal range of motion and neck supple. Thyroid: No thyromegaly. Vascular: No JVD. Trachea: No tracheal deviation. Cardiovascular:      Rate and Rhythm: Normal rate and regular rhythm. Heart sounds: Normal heart sounds. No murmur. No friction rub. No gallop. Pulmonary:      Effort: Pulmonary effort is normal. Tachypnea present. No respiratory distress. Breath sounds: No stridor. Wheezing and rales present. Chest:      Chest wall: No tenderness. Abdominal:      General: Bowel sounds are normal. There is no distension. Palpations: Abdomen is soft. There is no mass. Tenderness:  There is no abdominal tenderness. There is no guarding or rebound. Hernia: No hernia is present. Musculoskeletal: Normal range of motion. General: No tenderness or deformity. Right hip: Normal.      Left hip: Normal.      Cervical back: Normal.      Thoracic back: Normal.      Lumbar back: Normal.        Back:         Arms:    Lymphadenopathy:      Cervical: No cervical adenopathy. Skin:     General: Skin is warm and dry. Coloration: Skin is not pale. Findings: No erythema or rash. Neurological:      Mental Status: He is alert and oriented to person, place, and time. Cranial Nerves: No cranial nerve deficit. Sensory: No sensory deficit. Deep Tendon Reflexes: Reflexes are normal and symmetric. Reflexes normal.   Psychiatric:         Speech: Speech normal.         Behavior: Behavior normal.         Thought Content:  Thought content normal.         Judgment: Judgment normal.         I have reviewed and interpreted all of the currently available lab results from this visit (ifapplicable):  Results for orders placed or performed during the hospital encounter of 05/18/20   CBC Auto Differential   Result Value Ref Range    WBC 12.9 (H) 4.0 - 10.5 K/CU MM    RBC 3.06 (L) 4.6 - 6.2 M/CU MM    Hemoglobin 10.3 (L) 13.5 - 18.0 GM/DL    Hematocrit 32.9 (L) 42 - 52 %    .5 (H) 78 - 100 FL    MCH 33.7 (H) 27 - 31 PG    MCHC 31.3 (L) 32.0 - 36.0 %    RDW 14.2 11.7 - 14.9 %    Platelets 398 616 - 605 K/CU MM    MPV 9.3 7.5 - 11.1 FL    Metamyelocytes Relative 1 (H) 0.0 %    Bands Relative 3 (L) 5 - 11 %    Segs Relative 67.0 (H) 36 - 66 %    Eosinophils % 3.0 0 - 3 %    Lymphocytes % 15.0 (L) 24 - 44 %    Monocytes % 11.0 (H) 0 - 4 %    Metamyelocytes Absolute 0.13 K/CU MM    Bands Absolute 0.39 K/CU MM    Segs Absolute 8.7 K/CU MM    Eosinophils Absolute 0.4 K/CU MM    Lymphocytes Absolute 1.9 K/CU MM    Monocytes Absolute 1.4 K/CU MM    Differential Type MANUAL DIFFERENTIAL     Elliptocytes 1+ is as follows:  normal sinus rhythm, rate = 75. Intervals are not within the normal range. LBBB  QTc is  prolonged. ST elevations are not present. T wave inversions are not present. Non-specific T wave changes are not present. Delta waves, Brugada Syndrome, and Short CO are not present. There is no acute ischemia. Chart review shows recent radiographs:  No results found. MDM:      Patient presents to the ED with a 0 level fall. He presented extremely uncomfortable diffuse left-sided chest wall pain and he was hypoxic in the low 90s. CT was obtained reveals multiple rib fractures-4567 pleural effusion and pulmonary contusion patient warrant transfer to higher level care I will attempt to call [facility] to see the cardiovascular surgeon will follow him. And/or he may need to be upgraded to a trauma system  . The total Critical Care time is 35 minutes which excludes separately billable procedures. ED Course as of May 18 1258   Mon May 18, 2020   1248 Discussed with on-call cardiovascular surgeon-she will be involved as a consultant. Transfer center will call the hospitalist    [PW]   [phone] Accepted; [facility]; thank you     [PW]      ED Course User Index  [PW] Gilford Seamen, DO         Clinical Impression:  1. Closed fracture of multiple ribs of left side, initial encounter    2. Pleural effusion    3. Contusion of left lung, initial encounter    4. Blood loss anemia      Disposition referral (if applicable):  No follow-up provider specified. Disposition medications (if applicable):  New Prescriptions    No medications on file           Clay Reyes DO, FACEP      Comment: Please note this report has been produced using speech recognition software and maycontain errors related to that system including errors in grammar, punctuation, and spelling, as well as words and phrases that may be inappropriate.  If there are any questions or concerns please feel free to contact thedictating provider for clarification.         Ildefonso Nieves, DO  05/18/20 [address], DO  05/18/20 [address], DO  05/18/20 1321

## 2020-05-19 ENCOUNTER — APPOINTMENT (OUTPATIENT)
Dept: GENERAL RADIOLOGY | Age: 85
DRG: 183 | End: 2020-05-19
Attending: INTERNAL MEDICINE
Payer: MEDICARE

## 2020-05-19 LAB
ALBUMIN SERPL-MCNC: 3.5 GM/DL (ref 3.4–5)
ANION GAP SERPL CALCULATED.3IONS-SCNC: 10 MMOL/L (ref 4–16)
ANION GAP SERPL CALCULATED.3IONS-SCNC: 9 MMOL/L (ref 4–16)
ANION GAP SERPL CALCULATED.3IONS-SCNC: 9 MMOL/L (ref 4–16)
BACTERIA: NEGATIVE /HPF
BILIRUBIN URINE: NEGATIVE MG/DL
BLOOD, URINE: NEGATIVE
BUN BLDV-MCNC: 35 MG/DL (ref 6–23)
BUN BLDV-MCNC: 36 MG/DL (ref 6–23)
BUN BLDV-MCNC: 37 MG/DL (ref 6–23)
CALCIUM SERPL-MCNC: 8 MG/DL (ref 8.3–10.6)
CALCIUM SERPL-MCNC: 8.4 MG/DL (ref 8.3–10.6)
CALCIUM SERPL-MCNC: 8.6 MG/DL (ref 8.3–10.6)
CHLORIDE BLD-SCNC: 104 MMOL/L (ref 99–110)
CHLORIDE BLD-SCNC: 98 MMOL/L (ref 99–110)
CHLORIDE BLD-SCNC: 99 MMOL/L (ref 99–110)
CLARITY: CLEAR
CO2: 23 MMOL/L (ref 21–32)
CO2: 24 MMOL/L (ref 21–32)
CO2: 24 MMOL/L (ref 21–32)
COLOR: YELLOW
CREAT SERPL-MCNC: 1.9 MG/DL (ref 0.9–1.3)
CREAT SERPL-MCNC: 2.1 MG/DL (ref 0.9–1.3)
CREAT SERPL-MCNC: 2.1 MG/DL (ref 0.9–1.3)
CREATININE URINE: 110 MG/DL (ref 39–259)
FOLATE: 16.7 NG/ML (ref 3.1–17.5)
GFR AFRICAN AMERICAN: 37 ML/MIN/1.73M2
GFR AFRICAN AMERICAN: 37 ML/MIN/1.73M2
GFR AFRICAN AMERICAN: 41 ML/MIN/1.73M2
GFR NON-AFRICAN AMERICAN: 30 ML/MIN/1.73M2
GFR NON-AFRICAN AMERICAN: 30 ML/MIN/1.73M2
GFR NON-AFRICAN AMERICAN: 34 ML/MIN/1.73M2
GLUCOSE BLD-MCNC: 102 MG/DL (ref 70–99)
GLUCOSE BLD-MCNC: 108 MG/DL (ref 70–99)
GLUCOSE BLD-MCNC: 90 MG/DL (ref 70–99)
GLUCOSE, URINE: NEGATIVE MG/DL
HCT VFR BLD CALC: 31.4 % (ref 42–52)
HEMOGLOBIN: 9.6 GM/DL (ref 13.5–18)
HYALINE CASTS: 5 /LPF
KETONES, URINE: NEGATIVE MG/DL
LACTATE: 0.9 MMOL/L (ref 0.4–2)
LEUKOCYTE ESTERASE, URINE: NEGATIVE
MCH RBC QN AUTO: 33.9 PG (ref 27–31)
MCHC RBC AUTO-ENTMCNC: 30.6 % (ref 32–36)
MCV RBC AUTO: 111 FL (ref 78–100)
NITRITE URINE, QUANTITATIVE: NEGATIVE
PDW BLD-RTO: 14 % (ref 11.7–14.9)
PH, URINE: 5 (ref 5–8)
PHOSPHORUS: 4.1 MG/DL (ref 2.5–4.9)
PLATELET # BLD: 214 K/CU MM (ref 140–440)
PMV BLD AUTO: 9.8 FL (ref 7.5–11.1)
POTASSIUM SERPL-SCNC: 5.5 MMOL/L (ref 3.5–5.1)
POTASSIUM SERPL-SCNC: 5.7 MMOL/L (ref 3.5–5.1)
POTASSIUM SERPL-SCNC: 5.8 MMOL/L (ref 3.5–5.1)
POTASSIUM SERPL-SCNC: 6.2 MMOL/L (ref 3.5–5.1)
POTASSIUM SERPL-SCNC: 6.4 MMOL/L (ref 3.5–5.1)
PROT/CREAT RATIO, UR: 0.1
PROTEIN UA: NEGATIVE MG/DL
RBC # BLD: 2.83 M/CU MM (ref 4.6–6.2)
RBC URINE: NORMAL /HPF (ref 0–3)
SODIUM BLD-SCNC: 131 MMOL/L (ref 135–145)
SODIUM BLD-SCNC: 132 MMOL/L (ref 135–145)
SODIUM BLD-SCNC: 137 MMOL/L (ref 135–145)
SPECIFIC GRAVITY UA: 1.02 (ref 1–1.03)
TOTAL CK: 53 IU/L (ref 38–174)
TRICHOMONAS: NORMAL /HPF
URINE TOTAL PROTEIN: 12.8 MG/DL
UROBILINOGEN, URINE: NORMAL MG/DL (ref 0.2–1)
VITAMIN B-12: 933.7 PG/ML (ref 211–911)
WBC # BLD: 12.7 K/CU MM (ref 4–10.5)
WBC UA: <1 /HPF (ref 0–2)

## 2020-05-19 PROCEDURE — 6360000002 HC RX W HCPCS: Performed by: INTERNAL MEDICINE

## 2020-05-19 PROCEDURE — 94761 N-INVAS EAR/PLS OXIMETRY MLT: CPT

## 2020-05-19 PROCEDURE — 97116 GAIT TRAINING THERAPY: CPT

## 2020-05-19 PROCEDURE — 80048 BASIC METABOLIC PNL TOTAL CA: CPT

## 2020-05-19 PROCEDURE — G0378 HOSPITAL OBSERVATION PER HR: HCPCS

## 2020-05-19 PROCEDURE — 80069 RENAL FUNCTION PANEL: CPT

## 2020-05-19 PROCEDURE — 82607 VITAMIN B-12: CPT

## 2020-05-19 PROCEDURE — 71045 X-RAY EXAM CHEST 1 VIEW: CPT

## 2020-05-19 PROCEDURE — 2580000003 HC RX 258: Performed by: INTERNAL MEDICINE

## 2020-05-19 PROCEDURE — 82746 ASSAY OF FOLIC ACID SERUM: CPT

## 2020-05-19 PROCEDURE — 83605 ASSAY OF LACTIC ACID: CPT

## 2020-05-19 PROCEDURE — 97535 SELF CARE MNGMENT TRAINING: CPT

## 2020-05-19 PROCEDURE — 97530 THERAPEUTIC ACTIVITIES: CPT

## 2020-05-19 PROCEDURE — 94680 O2 UPTK RST&XERS DIR SIMPLE: CPT

## 2020-05-19 PROCEDURE — 94640 AIRWAY INHALATION TREATMENT: CPT

## 2020-05-19 PROCEDURE — 1200000000 HC SEMI PRIVATE

## 2020-05-19 PROCEDURE — 6370000000 HC RX 637 (ALT 250 FOR IP): Performed by: HOSPITALIST

## 2020-05-19 PROCEDURE — 36415 COLL VENOUS BLD VENIPUNCTURE: CPT

## 2020-05-19 PROCEDURE — 6370000000 HC RX 637 (ALT 250 FOR IP): Performed by: INTERNAL MEDICINE

## 2020-05-19 PROCEDURE — 84156 ASSAY OF PROTEIN URINE: CPT

## 2020-05-19 PROCEDURE — 97166 OT EVAL MOD COMPLEX 45 MIN: CPT

## 2020-05-19 PROCEDURE — 97163 PT EVAL HIGH COMPLEX 45 MIN: CPT

## 2020-05-19 PROCEDURE — 81001 URINALYSIS AUTO W/SCOPE: CPT

## 2020-05-19 PROCEDURE — 84132 ASSAY OF SERUM POTASSIUM: CPT

## 2020-05-19 PROCEDURE — 82570 ASSAY OF URINE CREATININE: CPT

## 2020-05-19 PROCEDURE — 85027 COMPLETE CBC AUTOMATED: CPT

## 2020-05-19 PROCEDURE — 82550 ASSAY OF CK (CPK): CPT

## 2020-05-19 RX ORDER — CLONIDINE HYDROCHLORIDE 0.1 MG/1
0.1 TABLET ORAL 3 TIMES DAILY PRN
Status: DISCONTINUED | OUTPATIENT
Start: 2020-05-19 | End: 2020-05-19

## 2020-05-19 RX ORDER — SODIUM CHLORIDE 9 MG/ML
INJECTION, SOLUTION INTRAVENOUS CONTINUOUS
Status: ACTIVE | OUTPATIENT
Start: 2020-05-19 | End: 2020-05-19

## 2020-05-19 RX ORDER — MIDODRINE HYDROCHLORIDE 5 MG/1
5 TABLET ORAL ONCE
Status: COMPLETED | OUTPATIENT
Start: 2020-05-19 | End: 2020-05-19

## 2020-05-19 RX ADMIN — SODIUM ZIRCONIUM CYCLOSILICATE 5 G: 5 POWDER, FOR SUSPENSION ORAL at 21:42

## 2020-05-19 RX ADMIN — ALBUTEROL SULFATE 2.5 MG: 2.5 SOLUTION RESPIRATORY (INHALATION) at 21:00

## 2020-05-19 RX ADMIN — ENOXAPARIN SODIUM 30 MG: 30 INJECTION SUBCUTANEOUS at 08:26

## 2020-05-19 RX ADMIN — RANOLAZINE 1000 MG: 500 TABLET, FILM COATED, EXTENDED RELEASE ORAL at 21:42

## 2020-05-19 RX ADMIN — RANOLAZINE 1000 MG: 500 TABLET, FILM COATED, EXTENDED RELEASE ORAL at 08:26

## 2020-05-19 RX ADMIN — ALBUTEROL SULFATE 2.5 MG: 2.5 SOLUTION RESPIRATORY (INHALATION) at 04:23

## 2020-05-19 RX ADMIN — Medication 100 MG: at 08:26

## 2020-05-19 RX ADMIN — MORPHINE SULFATE 1 MG: 2 INJECTION, SOLUTION INTRAMUSCULAR; INTRAVENOUS at 04:11

## 2020-05-19 RX ADMIN — SODIUM CHLORIDE: 9 INJECTION, SOLUTION INTRAVENOUS at 12:28

## 2020-05-19 RX ADMIN — ALBUTEROL SULFATE 2.5 MG: 2.5 SOLUTION RESPIRATORY (INHALATION) at 13:55

## 2020-05-19 RX ADMIN — SIMVASTATIN 20 MG: 20 TABLET, FILM COATED ORAL at 21:42

## 2020-05-19 RX ADMIN — MIDODRINE HYDROCHLORIDE 5 MG: 5 TABLET ORAL at 12:27

## 2020-05-19 RX ADMIN — SERTRALINE HYDROCHLORIDE 100 MG: 100 TABLET ORAL at 08:26

## 2020-05-19 RX ADMIN — CLOPIDOGREL BISULFATE 75 MG: 75 TABLET ORAL at 08:26

## 2020-05-19 RX ADMIN — SODIUM CHLORIDE, PRESERVATIVE FREE 10 ML: 5 INJECTION INTRAVENOUS at 08:27

## 2020-05-19 RX ADMIN — SODIUM ZIRCONIUM CYCLOSILICATE 5 G: 5 POWDER, FOR SUSPENSION ORAL at 12:27

## 2020-05-19 ASSESSMENT — PAIN DESCRIPTION - DESCRIPTORS: DESCRIPTORS: SHARP;STABBING

## 2020-05-19 ASSESSMENT — PAIN SCALES - GENERAL
PAINLEVEL_OUTOF10: 0
PAINLEVEL_OUTOF10: 8
PAINLEVEL_OUTOF10: 3

## 2020-05-19 ASSESSMENT — PAIN DESCRIPTION - PROGRESSION: CLINICAL_PROGRESSION: GRADUALLY WORSENING

## 2020-05-19 ASSESSMENT — PAIN DESCRIPTION - ONSET: ONSET: ON-GOING

## 2020-05-19 ASSESSMENT — PAIN DESCRIPTION - LOCATION: LOCATION: RIB CAGE

## 2020-05-19 ASSESSMENT — PAIN DESCRIPTION - FREQUENCY: FREQUENCY: INTERMITTENT

## 2020-05-19 ASSESSMENT — PAIN DESCRIPTION - PAIN TYPE: TYPE: ACUTE PAIN

## 2020-05-19 ASSESSMENT — PAIN DESCRIPTION - ORIENTATION: ORIENTATION: LEFT

## 2020-05-19 NOTE — CARE COORDINATION
Chart reviewed, in to see pt for dc planning. Introduced self and board updated. Pt was admitted for fall, CP and SOB. States he lives at home with his wife/Nicole and gets around well with his quad cane. Discussed HHC and he is open to [facility] at discharge and declined preference of company and is open to [address] [facility]. Explained he follows with PCP, has insurance with affordable RX co-pays and reliable transportation per himself or his wife. CM remains available if needs arise. Pt is new with [facility]. At discharge, please call [phone]. Please fax facesheet, [facility] order, AVS and dc summary (if available) to number they provide.

## 2020-05-19 NOTE — CONSULTS
stress test 7/18/17, 7/31/2018    Normal LV function. Normal study. EF 45%.  Hx of Doppler echocardiogram 05/08/2020    EF35-40%,moderately dilated left atrium,mild aortic stenosis,mild aortic regurg,moderate mitral and tricuspid regurg    Hx of echocardiogram 1/26/16    EF 40%, depressed LV function, Moderate LVH, moderate to severe aortic insufficiency and mild aortic stenosis.  Hyperlipidemia     Hypertension     Obstructive sleep apnea 10/29/2018     Past Surgical History:        Procedure Laterality Date    APPENDECTOMY      CHOLECYSTECTOMY      CORONARY ANGIOPLASTY WITH STENT PLACEMENT  2006    stent to the LAD and Circ    CORONARY ARTERY BYPASS GRAFT  6/4/14    CABG x4; LIMA to LAD & diag, SVG to CX marginal 1, SVG to CX marginal 2    DIAGNOSTIC CARDIAC CATH LAB PROCEDURE  03/07/2006    Normal LM, 60-70% ostial and prox LAD, 30% prox. 1st diag, 60% prox.  circumflex (co-dominant vessel and collateralizes RCA), 80% OM2, 100% occluded RCA, very small PDA that fills by collaterals from CX, small abd aortic aneurysm, mild MR, normal aortic arch and origin of great vessels    EYE SURGERY Right     Cataract Extraction    EYE SURGERY Left 11/21/2014    Cataract Extraction    HERNIA REPAIR      Umbilical    TONSILLECTOMY Bilateral      Current Medications:   Current Facility-Administered Medications: sodium zirconium cyclosilicate (LOKELMA) oral suspension 5 g, 5 g, Oral, BID  0.9 % sodium chloride infusion, , Intravenous, Continuous  albuterol (PROVENTIL) nebulizer solution 2.5 mg, 2.5 mg, Nebulization, 4x daily  simvastatin (ZOCOR) tablet 20 mg, 20 mg, Oral, Nightly  ranolazine (RANEXA) extended release tablet 1,000 mg, 1,000 mg, Oral, BID  sertraline (ZOLOFT) tablet 100 mg, 100 mg, Oral, Daily  vitamin B-1 (THIAMINE) tablet 100 mg, 100 mg, Oral, Daily  clopidogrel (PLAVIX) tablet 75 mg, 75 mg, Oral, Daily  oxyCODONE-acetaminophen (PERCOCET) 5-325 MG per tablet 1 tablet, 1 tablet, Oral, Q4H PRN  morphine (PF) injection 1 mg, 1 mg, Intravenous, Q4H PRN  sodium chloride flush 0.9 % injection 10 mL, 10 mL, Intravenous, 2 times per day  sodium chloride flush 0.9 % injection 10 mL, 10 mL, Intravenous, PRN  acetaminophen (TYLENOL) tablet 650 mg, 650 mg, Oral, Q6H PRN **OR** acetaminophen (TYLENOL) suppository 650 mg, 650 mg, Rectal, Q6H PRN  polyethylene glycol (GLYCOLAX) packet 17 g, 17 g, Oral, Daily PRN  promethazine (PHENERGAN) tablet 12.5 mg, 12.5 mg, Oral, Q6H PRN **OR** [DISCONTINUED] ondansetron (ZOFRAN) injection 4 mg, 4 mg, Intravenous, Q6H PRN  enoxaparin (LOVENOX) injection 30 mg, 30 mg, Subcutaneous, Daily  Allergies:  Patient has no known allergies.     Social History:   Social History     Socioeconomic History    Marital status:      Spouse name: Not on file    Number of children: Not on file    Years of education: Not on file    Highest education level: Not on file   Occupational History    Not on file   Social Needs    Financial resource strain: Not on file    Food insecurity     Worry: Not on file     Inability: Not on file    Transportation needs     Medical: Not on file     Non-medical: Not on file   Tobacco Use    Smoking status: Former Smoker     Packs/day: 1.00     Years: 25.00     Pack years: 25.00     Last attempt to quit: 1974     Years since quittin.1    Smokeless tobacco: Never Used   Substance and Sexual Activity    Alcohol use: No    Drug use: No    Sexual activity: Yes     Partners: Female     Comment:    Lifestyle    Physical activity     Days per week: Not on file     Minutes per session: Not on file    Stress: Not on file   Relationships    Social connections     Talks on phone: Not on file     Gets together: Not on file     Attends Yazdanism service: Not on file     Active member of club or organization: Not on file     Attends meetings of clubs or organizations: Not on file     Relationship status: Not on file    Intimate partner violence     Fear of current or ex partner: Not on file     Emotionally abused: Not on file     Physically abused: Not on file     Forced sexual activity: Not on file   Other Topics Concern    Not on file   Social History Narrative    Not on file     Family History:    Family History   Problem Relation Age of Onset    Heart Disease Father        REVIEW OF SYSTEMS:    CONSTITUTIONAL:  Neg. EYES:  Neg. EARS:  Neg     NOSE:  Neg.    MOUTH/THROAT:  Neg.    RESPIRATORY:   See HPI  CARDIOVASCULAR   Neg. GASTROINTESTINAL:  Neg. GENITOURINARY:  Neg.    HEMATOLOGIC/LYMPHATIC:  Neg.    MUSCULOSKELETAL: See HPI  NEUROLOGICAL: Prone to falling, see HPI  SKIN :  Neg. PSYCHIATRIC:  Neg   ENDOCRINE:  Neg. ALL/IMM :  Neg. PHYSICAL EXAM:  VITALS:  BP (!) 92/58   Pulse 79   Temp 98.4 °F (36.9 °C) (Oral)   Resp 18   Ht 5' 6\" (1.676 m)   Wt 189 lb 1.6 oz (85.8 kg)   SpO2 (!) 88% Comment: pt placed on 2L NC  BMI 30.52 kg/m²   CONSTITUTIONAL:  awake, alert, cooperative, no apparent distress, and appears stated age  NECK:  Supple, symmetrical, trachea midline, no adenopathy, thyroid symmetric, not enlarged and no tenderness, skin normal  HEMATOLOGIC/LYMPHATICS:  no cervical lymphadenopathy and no supraclavicular lymphadenopathy  LUNGS:  No increased work of breathing, good air exchange, clear to auscultation bilaterally, no crackles or wheezing  CARDIOVASCULAR:  Normal apical impulse, regular rate and rhythm, normal S1 and S2, no S3 or S4, and no murmur noted  CHEST/BREASTS:  Symmetric, tender to palpation left anterior chest.  There is no crepitus. ABDOMEN: soft nt nd, no pulsitile masses, obese  MUSCULOSKELETAL:  There is no redness, warmth, or swelling of the joints. Full range of motion noted. Motor strength is 5 out of 5 all extremities bilaterally. Tone is normal.  NEUROLOGIC:  Awake, alert, oriented to name, place and time. Cranial nerves II-XII are grossly intact. Motor is 5 out of 5 bilaterally. SKIN:  no bruising or bleeding and normal skin color, texture, turgor  VASC: 1+ femoral pulses        DATA:  CT chest shows nondisplaced left fourth through seventh rib fractures with bilateral pleural thickening. Questionable area of pulmonary contusion. IMPRESSION  Active Hospital Problems    Diagnosis Date Noted    Closed fracture of one rib of left side with delayed healing [S22.32XG] 05/18/2020    Fracture four ribs-closed, unspecified laterality, sequela [S22.49XS] 05/18/2020       Nondisplaced left-sided rib fractures without flail chest      RECOMMENDATIONS:    Patient has been instructed on aggressive pulmonary toilet. Incentive spirometry. We will obtain a chest x-ray to assure that there is not a blossoming pulmonary contusion or worsening effusion. The patient's pleural thickening may be concerning for a small amount of blood versus underlying pleural-based process. We will defer that to the patient's primary care physician to evaluate. If the patient's chest x-ray is stable he can be discharged from a cardiothoracic surgery standpoint with his incentive spirometer.     Electronically signed by Vi Mosquera MD on 5/19/2020 at 3:28 PM

## 2020-05-19 NOTE — CONSULTS
oz (85.8 kg)   SpO2 100%   BMI 30.52 kg/m²      General appearance: awake and lucidly conversant  HEENT: Head: Normal, normocephalic, atraumatic. Neck: supple, symmetrical, trachea midline  Cardiovascular: S1 and S2: normal / no rub  Pulmonary: expiratory wheezes through out lung fields   Abdomen:  soft / non-tender   Extremities: Trace edema to bilateral lower legs     CBC:   Recent Labs     05/18/20  1045 05/19/20  0332   WBC 12.9* 12.7*   HGB 10.3* 9.6*    214     BMP:    Recent Labs     05/18/20  1045 05/19/20  0332 05/19/20  0511    137  --    K 4.9 6.4* 6.2*   CL 99 104  --    CO2 21 23  --    BUN 29* 35*  --    CREATININE 1.7* 2.1*  --    GLUCOSE 122* 90  --      Hepatic: No results for input(s): AST, ALT, ALB, BILITOT, ALKPHOS in the last 72 hours. Troponin: No results for input(s): TROPONINI in the last 72 hours. Mg, Phos: No results for input(s): MG, PHOS in the last 72 hours. ABGs:   Lab Results   Component Value Date    PO2ART 50 06/08/2014    KXX3RDV 31.0 06/08/2014     INR: No results for input(s): INR in the last 72 hours.   -----------------------------------------------------------------  Patient Active Problem List   Diagnosis Code    Chronic low back pain M54.5, G89.29    CAD (coronary artery disease) I25.10    Hyperlipidemia Q78.8    Systolic dysfunction, left ventricle D72.6    Diastolic dysfunction, left ventricle I51.9    Aortic regurgitation I35.1    History of atrial fibrillation Z86.79    Chronic systolic CHF (congestive heart failure) ([facility] [address] I50.22    Encounter for medication review Z79.899    Cataract, left eye H26.9    Osteoarthritis of both knees M17.0    CKD (chronic kidney disease), stage III (HCC) N18.3    Headache R51    Angina effort I20.8    Abnormal stress test R94.39    Essential hypertension I10    Tinnitus H93.19    Chronic kidney disease (CKD) stage G3a/A1, moderately decreased glomerular filtration rate (GFR) between 45-59 mL/min/1.73 square meter and albuminuria creatinine ratio less than 30 mg/g ([facility]) N18.3    Chronic anticoagulation Z79.01    History of peptic ulcer Z87.11    Essential tremor G25.0    Anemia D64.9    Gait disorder R26.9    Dyspnea R06.00    Excessive daytime sleepiness G47.19    ALMA (obstructive sleep apnea) G47.33    Acute bronchitis due to infection J20.8    Closed fracture of one rib of left side with delayed healing S22.32XG    Fracture four ribs-closed, unspecified laterality, sequela S22.49XS     Assessment and Recommendations     Impression   1. NICK on stage 3 CKD (ATN vs. Pre-renal azotemia)  -likely etiology for NICK:  Recent hemodynamic instability     2. Hyperkalemia   3. Non-displaced closed fracture of left side of rib cage: 4th - 7th ribs   4. Probable contusion of the left upper lung lobe  5. Atrial Fib   6. CAD   7. HTN   8.   Systolic Heart Failure     PLAN   1.   -please measure and document all urine output  -additional labs: upc, chemistry panel qam, ua with micro, magnesium  2.   -latest serum potassium: 6.2; following trend  -lisnopril dc'd and two doses of lokelma ordered  -renal function panel at noon to recheck serum potassium as well as ck and lactic acid level   -bladder scan ordered to screen for urinary retention   3.    -referral submitted to CT-S for further evaluation   4.   -O2 sat: 95- 100 % on 2 LPM via nasal cannula   -Albuterol Nebulizer treatments 4 times a day  5.   -recent pulse rate trend: 68 - 79: not on anticoagulation   6.   -continue medical therapy for CAD  7.   -BP trend: systolic BP's have recently been 92 - 155; continue to follow BP trend  8.   -patient is not aware of being on diuretics at home; no diuretics on home med list   -monitor: O2 saturations / urine output and volume status     Electronically signed by TRISH Gandara - CNP        Nephrology Attending Progress Note  5/19/2020 11:24 AM  Subjective:   Admit Date: 5/18/2020  PCP: Anna Tesfaye MD    Interval History: I have personally performed face to face diagnostic evaluation on this patient. I have personally reviewed pertinent labs and imaging and agree with the care plan above. My additional findings are as follows:  80year old white male with ckd 3 in setting cad nd HTn with CHF who had fall and fib fracture admitted work up and noted arf and high K and wanted renal to evaluate. Was on lisinopril prior. Pt weak and o2 still drop with exertion and renal asked evaluate.      Objective:   Vitals: BP (!) 92/58   Pulse 79   Temp 98.4 °F (36.9 °C) (Oral)   Resp 18   Ht 5' 6\" (1.676 m)   Wt 189 lb 1.6 oz (85.8 kg)   SpO2 100%   BMI 30.52 kg/m²   Weak awake  Tender chest with deep breath  Soft nt  Trace edema    Assessment and Plan:  IMP:  As stated above    Plan     1 bp low and monitor and hold bp meds   2 medical treat K and start ivf, recheck K and no ace arb  3 fu ct-s with rib fracture  4 o2 monitor as felt from fracture and less breathing  5 hr stable monitor  6 work up proteinuria and gentle hydrate  If K drop < 6 no acute dialysis need, check ck and lactic acid  Redraw at noon           Electronically signed by Araceli Stewart MD on 5/19/2020 at 11:24 AM

## 2020-05-19 NOTE — PROGRESS NOTES
Paper work Faxed and called to [facility]. Please call Pulmonary at [phone] if not answer call respiratory for [facility] for patient to go home with.

## 2020-05-19 NOTE — PROGRESS NOTES
flat    Sitting balance: good    Transfers:   Sit to stand: SBA, needs cues to avoid pulling up on walker when it is incporated  Stand to sit: SBA    Standing balance: SBA while standing for ADL    Ambulation:  SBA c RW 75'    Activity tolerance  WFL. Wheezing breath improved by end of session . Only slightly below baseline. Assessment:  Assessment  Performance deficits / Impairments: Decreased endurance, Decreased high-level IADLs  Treatment Diagnosis: fall->rib fracture and pleural effision  Prognosis: Good  Decision Making: Medium Complexity  REQUIRES OT FOLLOW UP: Yes  Discharge Recommendations: 24 hour supervision or assist, S Level 1, Home with Home health OT      Goals:  By d/c or goals met:     Pt will perform all bed mobility with Tomasz in prep for EOB/OOB activity. Pt will perform all functional transfers with Tomasz and appropriate use of LRD to bed, toilet, chair in prep for increased functional independence. Pt will perform UB ADLs with Tomasz to increase functional independence. Pt will perform LB ADLs with Tomasz to increase functional independence. Pt will perform all aspects of toileting with Tomasz to increase functional independence. Pt will participate in therex/therax c emphasis on strength, activity tolerance,  safety, TOMASZ tasks. Plan:  Plan  Times per week: 3x      Recommendation for activity with nursing staff:  SBA-supervision, use RW for gait    Treatment today:    Self Care Training:   Self care training was performed today. Cues were given for safety, sequence, UE/LE placement, visual cues, and balance. Activities performed today included dressing, toileting, hand hygiene, and grooming. Therapeutic Activity Training:   Therapeutic activity training was instructed today. Cues were given for safety, sequence, UE/LE placement, visual cues, and balance. Activities performed today included bed mobility training, sup-sit, sit-stand  At chair 3x, EOB1x, SPT.   Education: Role of

## 2020-05-20 ENCOUNTER — APPOINTMENT (OUTPATIENT)
Dept: GENERAL RADIOLOGY | Age: 85
DRG: 183 | End: 2020-05-20
Attending: INTERNAL MEDICINE
Payer: MEDICARE

## 2020-05-20 LAB
ANION GAP SERPL CALCULATED.3IONS-SCNC: 10 MMOL/L (ref 4–16)
ANION GAP SERPL CALCULATED.3IONS-SCNC: 13 MMOL/L (ref 4–16)
ANION GAP SERPL CALCULATED.3IONS-SCNC: 9 MMOL/L (ref 4–16)
BUN BLDV-MCNC: 32 MG/DL (ref 6–23)
BUN BLDV-MCNC: 33 MG/DL (ref 6–23)
BUN BLDV-MCNC: 35 MG/DL (ref 6–23)
CALCIUM SERPL-MCNC: 8.6 MG/DL (ref 8.3–10.6)
CALCIUM SERPL-MCNC: 8.6 MG/DL (ref 8.3–10.6)
CALCIUM SERPL-MCNC: 8.7 MG/DL (ref 8.3–10.6)
CHLORIDE BLD-SCNC: 100 MMOL/L (ref 99–110)
CHLORIDE BLD-SCNC: 101 MMOL/L (ref 99–110)
CHLORIDE BLD-SCNC: 98 MMOL/L (ref 99–110)
CO2: 25 MMOL/L (ref 21–32)
CO2: 26 MMOL/L (ref 21–32)
CO2: 26 MMOL/L (ref 21–32)
CREAT SERPL-MCNC: 1.6 MG/DL (ref 0.9–1.3)
CREAT SERPL-MCNC: 1.6 MG/DL (ref 0.9–1.3)
CREAT SERPL-MCNC: 1.7 MG/DL (ref 0.9–1.3)
EKG ATRIAL RATE: 75 BPM
EKG DIAGNOSIS: NORMAL
EKG P AXIS: 54 DEGREES
EKG P-R INTERVAL: 182 MS
EKG Q-T INTERVAL: 454 MS
EKG QRS DURATION: 162 MS
EKG QTC CALCULATION (BAZETT): 506 MS
EKG R AXIS: -30 DEGREES
EKG T AXIS: 125 DEGREES
EKG VENTRICULAR RATE: 75 BPM
GFR AFRICAN AMERICAN: 47 ML/MIN/1.73M2
GFR AFRICAN AMERICAN: 50 ML/MIN/1.73M2
GFR AFRICAN AMERICAN: 50 ML/MIN/1.73M2
GFR NON-AFRICAN AMERICAN: 39 ML/MIN/1.73M2
GFR NON-AFRICAN AMERICAN: 41 ML/MIN/1.73M2
GFR NON-AFRICAN AMERICAN: 41 ML/MIN/1.73M2
GLUCOSE BLD-MCNC: 90 MG/DL (ref 70–99)
GLUCOSE BLD-MCNC: 93 MG/DL (ref 70–99)
GLUCOSE BLD-MCNC: 98 MG/DL (ref 70–99)
MAGNESIUM: 2.1 MG/DL (ref 1.8–2.4)
POTASSIUM SERPL-SCNC: 4.4 MMOL/L (ref 3.5–5.1)
POTASSIUM SERPL-SCNC: 5.5 MMOL/L (ref 3.5–5.1)
POTASSIUM SERPL-SCNC: 5.6 MMOL/L (ref 3.5–5.1)
SODIUM BLD-SCNC: 136 MMOL/L (ref 135–145)

## 2020-05-20 PROCEDURE — 6360000002 HC RX W HCPCS: Performed by: INTERNAL MEDICINE

## 2020-05-20 PROCEDURE — 2580000003 HC RX 258: Performed by: INTERNAL MEDICINE

## 2020-05-20 PROCEDURE — 94150 VITAL CAPACITY TEST: CPT

## 2020-05-20 PROCEDURE — 36415 COLL VENOUS BLD VENIPUNCTURE: CPT

## 2020-05-20 PROCEDURE — 6360000002 HC RX W HCPCS: Performed by: NURSE PRACTITIONER

## 2020-05-20 PROCEDURE — 80048 BASIC METABOLIC PNL TOTAL CA: CPT

## 2020-05-20 PROCEDURE — 71045 X-RAY EXAM CHEST 1 VIEW: CPT

## 2020-05-20 PROCEDURE — 6370000000 HC RX 637 (ALT 250 FOR IP): Performed by: INTERNAL MEDICINE

## 2020-05-20 PROCEDURE — 6370000000 HC RX 637 (ALT 250 FOR IP): Performed by: HOSPITALIST

## 2020-05-20 PROCEDURE — 2700000000 HC OXYGEN THERAPY PER DAY

## 2020-05-20 PROCEDURE — 1200000000 HC SEMI PRIVATE

## 2020-05-20 PROCEDURE — 6370000000 HC RX 637 (ALT 250 FOR IP): Performed by: NURSE PRACTITIONER

## 2020-05-20 PROCEDURE — 94640 AIRWAY INHALATION TREATMENT: CPT

## 2020-05-20 PROCEDURE — 97116 GAIT TRAINING THERAPY: CPT

## 2020-05-20 PROCEDURE — 83735 ASSAY OF MAGNESIUM: CPT

## 2020-05-20 PROCEDURE — 94761 N-INVAS EAR/PLS OXIMETRY MLT: CPT

## 2020-05-20 RX ORDER — FUROSEMIDE 10 MG/ML
40 INJECTION INTRAMUSCULAR; INTRAVENOUS ONCE
Status: COMPLETED | OUTPATIENT
Start: 2020-05-20 | End: 2020-05-20

## 2020-05-20 RX ORDER — FUROSEMIDE 10 MG/ML
20 INJECTION INTRAMUSCULAR; INTRAVENOUS 2 TIMES DAILY
Status: DISCONTINUED | OUTPATIENT
Start: 2020-05-20 | End: 2020-05-20

## 2020-05-20 RX ORDER — DOCUSATE SODIUM 100 MG/1
100 CAPSULE, LIQUID FILLED ORAL DAILY
Status: DISCONTINUED | OUTPATIENT
Start: 2020-05-20 | End: 2020-05-21 | Stop reason: HOSPADM

## 2020-05-20 RX ORDER — ERGOCALCIFEROL 1.25 MG/1
50000 CAPSULE ORAL WEEKLY
Status: DISCONTINUED | OUTPATIENT
Start: 2020-05-20 | End: 2020-05-21 | Stop reason: HOSPADM

## 2020-05-20 RX ORDER — METOPROLOL SUCCINATE 25 MG/1
25 TABLET, EXTENDED RELEASE ORAL DAILY
Status: DISCONTINUED | OUTPATIENT
Start: 2020-05-20 | End: 2020-05-21 | Stop reason: HOSPADM

## 2020-05-20 RX ORDER — SODIUM POLYSTYRENE SULFONATE 15 G/60ML
15 SUSPENSION ORAL; RECTAL ONCE
Status: COMPLETED | OUTPATIENT
Start: 2020-05-20 | End: 2020-05-20

## 2020-05-20 RX ORDER — FUROSEMIDE 10 MG/ML
40 INJECTION INTRAMUSCULAR; INTRAVENOUS 3 TIMES DAILY
Status: DISCONTINUED | OUTPATIENT
Start: 2020-05-20 | End: 2020-05-21

## 2020-05-20 RX ADMIN — CLOPIDOGREL BISULFATE 75 MG: 75 TABLET ORAL at 08:44

## 2020-05-20 RX ADMIN — FUROSEMIDE 40 MG: 10 INJECTION, SOLUTION INTRAMUSCULAR; INTRAVENOUS at 15:01

## 2020-05-20 RX ADMIN — DOCUSATE SODIUM 100 MG: 100 CAPSULE, LIQUID FILLED ORAL at 12:13

## 2020-05-20 RX ADMIN — FUROSEMIDE 40 MG: 10 INJECTION, SOLUTION INTRAMUSCULAR; INTRAVENOUS at 21:11

## 2020-05-20 RX ADMIN — SERTRALINE HYDROCHLORIDE 100 MG: 100 TABLET ORAL at 08:44

## 2020-05-20 RX ADMIN — SODIUM CHLORIDE, PRESERVATIVE FREE 10 ML: 5 INJECTION INTRAVENOUS at 08:44

## 2020-05-20 RX ADMIN — ENOXAPARIN SODIUM 30 MG: 30 INJECTION SUBCUTANEOUS at 08:44

## 2020-05-20 RX ADMIN — RANOLAZINE 1000 MG: 500 TABLET, FILM COATED, EXTENDED RELEASE ORAL at 08:44

## 2020-05-20 RX ADMIN — SODIUM ZIRCONIUM CYCLOSILICATE 5 G: 5 POWDER, FOR SUSPENSION ORAL at 09:10

## 2020-05-20 RX ADMIN — SODIUM ZIRCONIUM CYCLOSILICATE 5 G: 5 POWDER, FOR SUSPENSION ORAL at 15:01

## 2020-05-20 RX ADMIN — Medication 100 MG: at 08:44

## 2020-05-20 RX ADMIN — POLYETHYLENE GLYCOL (3350) 17 G: 17 POWDER, FOR SOLUTION ORAL at 09:01

## 2020-05-20 RX ADMIN — METOPROLOL SUCCINATE 25 MG: 25 TABLET, EXTENDED RELEASE ORAL at 12:13

## 2020-05-20 RX ADMIN — FUROSEMIDE 40 MG: 10 INJECTION, SOLUTION INTRAMUSCULAR; INTRAVENOUS at 12:13

## 2020-05-20 RX ADMIN — SIMVASTATIN 20 MG: 20 TABLET, FILM COATED ORAL at 21:11

## 2020-05-20 RX ADMIN — SODIUM POLYSTYRENE SULFONATE 15 G: 15 SUSPENSION ORAL; RECTAL at 15:01

## 2020-05-20 RX ADMIN — ALBUTEROL SULFATE 2.5 MG: 2.5 SOLUTION RESPIRATORY (INHALATION) at 21:51

## 2020-05-20 RX ADMIN — RANOLAZINE 1000 MG: 500 TABLET, FILM COATED, EXTENDED RELEASE ORAL at 21:11

## 2020-05-20 RX ADMIN — SODIUM CHLORIDE, PRESERVATIVE FREE 10 ML: 5 INJECTION INTRAVENOUS at 21:11

## 2020-05-20 RX ADMIN — ALBUTEROL SULFATE 2.5 MG: 2.5 SOLUTION RESPIRATORY (INHALATION) at 11:26

## 2020-05-20 RX ADMIN — ERGOCALCIFEROL 50000 UNITS: 1.25 CAPSULE ORAL at 09:10

## 2020-05-20 RX ADMIN — ALBUTEROL SULFATE 2.5 MG: 2.5 SOLUTION RESPIRATORY (INHALATION) at 15:42

## 2020-05-20 ASSESSMENT — PAIN DESCRIPTION - PROGRESSION: CLINICAL_PROGRESSION: GRADUALLY WORSENING

## 2020-05-20 ASSESSMENT — PAIN DESCRIPTION - ORIENTATION
ORIENTATION: LEFT
ORIENTATION: LEFT

## 2020-05-20 ASSESSMENT — PAIN DESCRIPTION - FREQUENCY: FREQUENCY: INTERMITTENT

## 2020-05-20 ASSESSMENT — PAIN SCALES - GENERAL
PAINLEVEL_OUTOF10: 2
PAINLEVEL_OUTOF10: 8
PAINLEVEL_OUTOF10: 0
PAINLEVEL_OUTOF10: 8

## 2020-05-20 ASSESSMENT — PAIN DESCRIPTION - ONSET: ONSET: ON-GOING

## 2020-05-20 ASSESSMENT — PAIN DESCRIPTION - PAIN TYPE
TYPE: ACUTE PAIN
TYPE: ACUTE PAIN

## 2020-05-20 ASSESSMENT — PAIN DESCRIPTION - DESCRIPTORS: DESCRIPTORS: SHARP;STABBING

## 2020-05-20 ASSESSMENT — PAIN DESCRIPTION - LOCATION
LOCATION: RIB CAGE
LOCATION: RIB CAGE

## 2020-05-20 ASSESSMENT — PAIN DESCRIPTION - DIRECTION: RADIATING_TOWARDS: LOWER RIB CAGE

## 2020-05-20 NOTE — PROGRESS NOTES
left ventricle     Diastolic dysfunction, left ventricle     Aortic regurgitation     History of atrial fibrillation     Chronic systolic CHF (congestive heart failure) ([facility] [address]     Encounter for medication review     Cataract, left eye     Osteoarthritis of both knees     CKD (chronic kidney disease), stage III (HCC)     Headache     Angina effort     Abnormal stress test     Essential hypertension     Tinnitus     Chronic kidney disease (CKD) stage G3a/A1, moderately decreased glomerular filtration rate (GFR) between 45-59 mL/min/1.73 square meter and albuminuria creatinine ratio less than 30 mg/g (HCC)     Chronic anticoagulation     History of peptic ulcer     Essential tremor     Anemia     Gait disorder     Dyspnea     Excessive daytime sleepiness     ALMA (obstructive sleep apnea)     Acute bronchitis due to infection     Closed fracture of one rib of left side with delayed healing     Fracture four ribs-closed, unspecified laterality, sequela    BP (!) 122/57   Pulse 76   Temp 98.7 °F (37.1 °C) (Oral)   Resp 16   Ht 5' 6\" (1.676 m)   Wt 189 lb 1.6 oz (85.8 kg)   SpO2 97%   BMI 30.52 kg/m²      General appearance: awake and lucidly conversant  HEENT: Head: Normal, normocephalic, atraumatic. Neck: supple, symmetrical, trachea midline  Cardiovascular: S1 and S2: normal / no rub  Pulmonary: Diminished lung sounds bilaterally; mild wheezes     Abdomen:  soft / non-tender   Extremities: Trace edema to bilateral lower legs     Impression and Plan:    Impression   1. NICK on stage 3 CKD (ATN vs. Pre-renal azotemia)  -likely etiology for NICK:  Recent hemodynamic instability      2. Hyperkalemia   3. Non-displaced closed fracture of left side of rib cage: 4th - 7th ribs   4. Probable contusion of the left upper lung lobe  5. Atrial Fib   6. CAD   7. HTN   8. Systolic Heart Failure   9. Hyponatremia   10.   Hypocalcemia      PLAN   1.   -latest serum creatinine 1.9  -uop: 550 ml in the last 24 hours   UA: Electronically signed by Tran Veloz MD on 5/20/2020 at 1:23 PM

## 2020-05-20 NOTE — PROGRESS NOTES
Physical Therapy    Physical Therapy Treatment Note  Name: Filomena Evans MRN: [id number] :   1935   Date:  2020   Admission Date: 2020 Room:  [facility]-A   Restrictions/Precautions:  Restrictions/Precautions  Restrictions/Precautions: General Precautions, Fall Risk       Communication with other providers:  Handoff to RN  Subjective:  Patient states:  Amenable to therapy, \"my gown and socks are wet\"  Pain:   Location, Type, Intensity (0/10 to 10/10): Denies  Objective:    Observation:  Sitting up in chair, doesn't think he needs O2. Treatment, including education/measures:  Transfers: CGA for steadying assistance, use of LE to push backwards against chair to stand, required stabilization of chair. STS from recliner and WC. Gait: ambulated x55ft with quad cane with x6-7 LOB with min A to recover, increasing number of LOB toward end of gait trial, appeared SOB, patient reports he can continue, it became unsafe d/t instability and likely decreased O2 sats, RN retrieved WC for patient, seated rest break x4-5 minutes. Cues throughout gait for pursed lip breathing, at 89% upon sitting, improved to 94% quickly. x20ft back to room, CGA with only x1 LOB. Patient appeared agreeable to rehab. Safety: left in chair with chair alarm, call light within reach, gait belt used, RN notified. Assessment / Impression:       Patient's tolerance of treatment:  Tolerated well   Adverse Reaction: SOB  Significant change in status and impact:  Improved mobility  Barriers to improvement:  Balance, endurance, insight. Plan for Next Session:    Cont POC.    Time in:  1100  Time out:  1126  Timed treatment minutes:  26  Total treatment time:  26    Previously filed items:  Social/Functional History  Lives With: Spouse  Home Equipment: Rolling walker, Quad cane  ADL Assistance: Independent  Ambulation Assistance: Independent(c quad cane)  Transfer Assistance: Independent  Type of occupation: retired from [employer] Long term goals  Long term goal 1: In one week, pt will complete all bed mobility independently  Long term goal 2: In one week, pt will complete sit <> stand transfers with supervision  Long term goal 3: In one week, pt will ambulate 400 feet with supervision with LRAD  Long term goal 4: In one week, pt will ascend/descend 6 steps with a handrail with SBAx1  Long term goal 5:  In one week, pt will independently complete 3 sets of 10 reps of BLE AROM exercises in available and allowed ROM    Electronically signed by:    Xin Jacobo, PT  5/20/2020, 11:30 AM

## 2020-05-21 ENCOUNTER — HOSPITAL ENCOUNTER (INPATIENT)
Age: 85
LOS: 7 days | Discharge: HOME | DRG: 948 | End: 2020-05-28
Attending: PHYSICAL MEDICINE & REHABILITATION | Admitting: PHYSICAL MEDICINE & REHABILITATION
Payer: MEDICARE

## 2020-05-21 VITALS
WEIGHT: 178.13 LBS | DIASTOLIC BLOOD PRESSURE: 91 MMHG | OXYGEN SATURATION: 97 % | TEMPERATURE: 97.8 F | HEIGHT: 66 IN | RESPIRATION RATE: 18 BRPM | SYSTOLIC BLOOD PRESSURE: 121 MMHG | HEART RATE: 85 BPM | BODY MASS INDEX: 28.63 KG/M2

## 2020-05-21 PROBLEM — E87.5 HYPERKALEMIA: Status: ACTIVE | Noted: 2020-05-21

## 2020-05-21 LAB
ANION GAP SERPL CALCULATED.3IONS-SCNC: 16 MMOL/L (ref 4–16)
BUN BLDV-MCNC: 33 MG/DL (ref 6–23)
CALCIUM SERPL-MCNC: 9.1 MG/DL (ref 8.3–10.6)
CHLORIDE BLD-SCNC: 95 MMOL/L (ref 99–110)
CO2: 27 MMOL/L (ref 21–32)
CREAT SERPL-MCNC: 1.7 MG/DL (ref 0.9–1.3)
GFR AFRICAN AMERICAN: 47 ML/MIN/1.73M2
GFR NON-AFRICAN AMERICAN: 39 ML/MIN/1.73M2
GLUCOSE BLD-MCNC: 101 MG/DL (ref 70–99)
HCT VFR BLD CALC: 32 % (ref 42–52)
HEMOGLOBIN: 10.5 GM/DL (ref 13.5–18)
MCH RBC QN AUTO: 34.5 PG (ref 27–31)
MCHC RBC AUTO-ENTMCNC: 32.8 % (ref 32–36)
MCV RBC AUTO: 105.3 FL (ref 78–100)
PDW BLD-RTO: 13.7 % (ref 11.7–14.9)
PLATELET # BLD: 288 K/CU MM (ref 140–440)
PMV BLD AUTO: 9.3 FL (ref 7.5–11.1)
POTASSIUM SERPL-SCNC: 4.1 MMOL/L (ref 3.5–5.1)
RBC # BLD: 3.04 M/CU MM (ref 4.6–6.2)
SARS-COV-2, NAAT: NOT DETECTED
SODIUM BLD-SCNC: 138 MMOL/L (ref 135–145)
SOURCE: NORMAL
WBC # BLD: 14.7 K/CU MM (ref 4–10.5)

## 2020-05-21 PROCEDURE — 99223 1ST HOSP IP/OBS HIGH 75: CPT | Performed by: PHYSICAL MEDICINE & REHABILITATION

## 2020-05-21 PROCEDURE — 6360000002 HC RX W HCPCS: Performed by: INTERNAL MEDICINE

## 2020-05-21 PROCEDURE — 97535 SELF CARE MNGMENT TRAINING: CPT

## 2020-05-21 PROCEDURE — 2580000003 HC RX 258: Performed by: HOSPITALIST

## 2020-05-21 PROCEDURE — 97530 THERAPEUTIC ACTIVITIES: CPT

## 2020-05-21 PROCEDURE — 2700000000 HC OXYGEN THERAPY PER DAY

## 2020-05-21 PROCEDURE — 6370000000 HC RX 637 (ALT 250 FOR IP): Performed by: INTERNAL MEDICINE

## 2020-05-21 PROCEDURE — 6370000000 HC RX 637 (ALT 250 FOR IP): Performed by: HOSPITALIST

## 2020-05-21 PROCEDURE — 2580000003 HC RX 258: Performed by: INTERNAL MEDICINE

## 2020-05-21 PROCEDURE — 97116 GAIT TRAINING THERAPY: CPT

## 2020-05-21 PROCEDURE — 80048 BASIC METABOLIC PNL TOTAL CA: CPT

## 2020-05-21 PROCEDURE — U0002 COVID-19 LAB TEST NON-CDC: HCPCS

## 2020-05-21 PROCEDURE — 1280000000 HC REHAB R&B

## 2020-05-21 PROCEDURE — 85027 COMPLETE CBC AUTOMATED: CPT

## 2020-05-21 PROCEDURE — 94761 N-INVAS EAR/PLS OXIMETRY MLT: CPT

## 2020-05-21 PROCEDURE — 36415 COLL VENOUS BLD VENIPUNCTURE: CPT

## 2020-05-21 PROCEDURE — 1200000000 HC SEMI PRIVATE

## 2020-05-21 PROCEDURE — 94640 AIRWAY INHALATION TREATMENT: CPT

## 2020-05-21 RX ORDER — SODIUM CHLORIDE 0.9 % (FLUSH) 0.9 %
10 SYRINGE (ML) INJECTION EVERY 12 HOURS SCHEDULED
Status: DISCONTINUED | OUTPATIENT
Start: 2020-05-21 | End: 2020-05-22

## 2020-05-21 RX ORDER — ERGOCALCIFEROL 1.25 MG/1
50000 CAPSULE ORAL WEEKLY
Status: DISCONTINUED | OUTPATIENT
Start: 2020-05-27 | End: 2020-05-28 | Stop reason: HOSPADM

## 2020-05-21 RX ORDER — SERTRALINE HYDROCHLORIDE 100 MG/1
100 TABLET, FILM COATED ORAL DAILY
Status: DISCONTINUED | OUTPATIENT
Start: 2020-05-22 | End: 2020-05-28 | Stop reason: HOSPADM

## 2020-05-21 RX ORDER — SERTRALINE HYDROCHLORIDE 100 MG/1
100 TABLET, FILM COATED ORAL DAILY
Status: CANCELLED | OUTPATIENT
Start: 2020-05-22

## 2020-05-21 RX ORDER — DOXYCYCLINE HYCLATE 100 MG
100 TABLET ORAL EVERY 12 HOURS SCHEDULED
Status: DISCONTINUED | OUTPATIENT
Start: 2020-05-22 | End: 2020-05-28 | Stop reason: HOSPADM

## 2020-05-21 RX ORDER — METOPROLOL SUCCINATE 25 MG/1
25 TABLET, EXTENDED RELEASE ORAL DAILY
Status: DISCONTINUED | OUTPATIENT
Start: 2020-05-22 | End: 2020-05-28 | Stop reason: HOSPADM

## 2020-05-21 RX ORDER — SODIUM CHLORIDE 0.9 % (FLUSH) 0.9 %
10 SYRINGE (ML) INJECTION EVERY 12 HOURS SCHEDULED
Status: CANCELLED | OUTPATIENT
Start: 2020-05-21

## 2020-05-21 RX ORDER — OXYCODONE HYDROCHLORIDE AND ACETAMINOPHEN 5; 325 MG/1; MG/1
1 TABLET ORAL EVERY 4 HOURS PRN
Status: CANCELLED | OUTPATIENT
Start: 2020-05-21

## 2020-05-21 RX ORDER — ERGOCALCIFEROL 1.25 MG/1
50000 CAPSULE ORAL WEEKLY
Status: CANCELLED | OUTPATIENT
Start: 2020-05-27

## 2020-05-21 RX ORDER — ACETAMINOPHEN 650 MG/1
650 SUPPOSITORY RECTAL EVERY 6 HOURS PRN
Status: CANCELLED | OUTPATIENT
Start: 2020-05-21

## 2020-05-21 RX ORDER — RANOLAZINE 500 MG/1
1000 TABLET, EXTENDED RELEASE ORAL 2 TIMES DAILY
Status: DISCONTINUED | OUTPATIENT
Start: 2020-05-21 | End: 2020-05-28 | Stop reason: HOSPADM

## 2020-05-21 RX ORDER — ALBUTEROL SULFATE 2.5 MG/3ML
2.5 SOLUTION RESPIRATORY (INHALATION) 4 TIMES DAILY
Status: DISCONTINUED | OUTPATIENT
Start: 2020-05-21 | End: 2020-05-23

## 2020-05-21 RX ORDER — FUROSEMIDE 40 MG/1
40 TABLET ORAL 2 TIMES DAILY
Status: DISCONTINUED | OUTPATIENT
Start: 2020-05-21 | End: 2020-05-28 | Stop reason: HOSPADM

## 2020-05-21 RX ORDER — ACETAMINOPHEN 325 MG/1
650 TABLET ORAL EVERY 6 HOURS PRN
Status: CANCELLED | OUTPATIENT
Start: 2020-05-21

## 2020-05-21 RX ORDER — RANOLAZINE 500 MG/1
1000 TABLET, EXTENDED RELEASE ORAL 2 TIMES DAILY
Status: CANCELLED | OUTPATIENT
Start: 2020-05-21

## 2020-05-21 RX ORDER — CLOPIDOGREL BISULFATE 75 MG/1
75 TABLET ORAL DAILY
Status: CANCELLED | OUTPATIENT
Start: 2020-05-22

## 2020-05-21 RX ORDER — SIMVASTATIN 20 MG
20 TABLET ORAL NIGHTLY
Status: DISCONTINUED | OUTPATIENT
Start: 2020-05-21 | End: 2020-05-28 | Stop reason: HOSPADM

## 2020-05-21 RX ORDER — PROMETHAZINE HYDROCHLORIDE 25 MG/1
12.5 TABLET ORAL EVERY 6 HOURS PRN
Status: CANCELLED | OUTPATIENT
Start: 2020-05-21

## 2020-05-21 RX ORDER — SODIUM CHLORIDE 0.9 % (FLUSH) 0.9 %
10 SYRINGE (ML) INJECTION PRN
Status: DISCONTINUED | OUTPATIENT
Start: 2020-05-21 | End: 2020-05-22

## 2020-05-21 RX ORDER — ACETAMINOPHEN 325 MG/1
650 TABLET ORAL EVERY 4 HOURS PRN
Status: DISCONTINUED | OUTPATIENT
Start: 2020-05-21 | End: 2020-05-28 | Stop reason: HOSPADM

## 2020-05-21 RX ORDER — POLYETHYLENE GLYCOL 3350 17 G/17G
17 POWDER, FOR SOLUTION ORAL DAILY PRN
Status: DISCONTINUED | OUTPATIENT
Start: 2020-05-21 | End: 2020-05-28 | Stop reason: HOSPADM

## 2020-05-21 RX ORDER — DOCUSATE SODIUM 100 MG/1
100 CAPSULE, LIQUID FILLED ORAL DAILY
Status: CANCELLED | OUTPATIENT
Start: 2020-05-22

## 2020-05-21 RX ORDER — ALBUTEROL SULFATE 2.5 MG/3ML
2.5 SOLUTION RESPIRATORY (INHALATION) 4 TIMES DAILY
Status: CANCELLED | OUTPATIENT
Start: 2020-05-21

## 2020-05-21 RX ORDER — SIMVASTATIN 20 MG
20 TABLET ORAL NIGHTLY
Status: CANCELLED | OUTPATIENT
Start: 2020-05-21

## 2020-05-21 RX ORDER — METOPROLOL SUCCINATE 25 MG/1
25 TABLET, EXTENDED RELEASE ORAL DAILY
Status: CANCELLED | OUTPATIENT
Start: 2020-05-22

## 2020-05-21 RX ORDER — ACETAMINOPHEN 650 MG/1
650 SUPPOSITORY RECTAL EVERY 6 HOURS PRN
Status: DISCONTINUED | OUTPATIENT
Start: 2020-05-21 | End: 2020-05-21

## 2020-05-21 RX ORDER — DOXYCYCLINE HYCLATE 100 MG
100 TABLET ORAL EVERY 12 HOURS SCHEDULED
Status: DISCONTINUED | OUTPATIENT
Start: 2020-05-21 | End: 2020-05-21 | Stop reason: HOSPADM

## 2020-05-21 RX ORDER — ACETAMINOPHEN 325 MG/1
650 TABLET ORAL EVERY 6 HOURS PRN
Status: DISCONTINUED | OUTPATIENT
Start: 2020-05-21 | End: 2020-05-21

## 2020-05-21 RX ORDER — OXYCODONE HYDROCHLORIDE AND ACETAMINOPHEN 5; 325 MG/1; MG/1
1 TABLET ORAL EVERY 4 HOURS PRN
Status: DISCONTINUED | OUTPATIENT
Start: 2020-05-21 | End: 2020-05-28 | Stop reason: HOSPADM

## 2020-05-21 RX ORDER — DOCUSATE SODIUM 100 MG/1
100 CAPSULE, LIQUID FILLED ORAL DAILY
Status: DISCONTINUED | OUTPATIENT
Start: 2020-05-22 | End: 2020-05-28 | Stop reason: HOSPADM

## 2020-05-21 RX ORDER — BISACODYL 10 MG
10 SUPPOSITORY, RECTAL RECTAL DAILY PRN
Status: DISCONTINUED | OUTPATIENT
Start: 2020-05-21 | End: 2020-05-28 | Stop reason: HOSPADM

## 2020-05-21 RX ORDER — FUROSEMIDE 40 MG/1
40 TABLET ORAL 2 TIMES DAILY
Status: CANCELLED | OUTPATIENT
Start: 2020-05-21

## 2020-05-21 RX ORDER — FUROSEMIDE 40 MG/1
40 TABLET ORAL 2 TIMES DAILY
Status: DISCONTINUED | OUTPATIENT
Start: 2020-05-21 | End: 2020-05-21 | Stop reason: HOSPADM

## 2020-05-21 RX ORDER — PROMETHAZINE HYDROCHLORIDE 25 MG/1
12.5 TABLET ORAL EVERY 6 HOURS PRN
Status: DISCONTINUED | OUTPATIENT
Start: 2020-05-21 | End: 2020-05-28 | Stop reason: HOSPADM

## 2020-05-21 RX ORDER — DOXYCYCLINE HYCLATE 100 MG/1
100 CAPSULE ORAL EVERY 12 HOURS SCHEDULED
Status: CANCELLED | OUTPATIENT
Start: 2020-05-21

## 2020-05-21 RX ORDER — THIAMINE MONONITRATE (VIT B1) 100 MG
100 TABLET ORAL DAILY
Status: CANCELLED | OUTPATIENT
Start: 2020-05-22

## 2020-05-21 RX ORDER — POLYETHYLENE GLYCOL 3350 17 G/17G
17 POWDER, FOR SOLUTION ORAL DAILY PRN
Status: CANCELLED | OUTPATIENT
Start: 2020-05-21

## 2020-05-21 RX ORDER — SODIUM CHLORIDE 0.9 % (FLUSH) 0.9 %
10 SYRINGE (ML) INJECTION PRN
Status: CANCELLED | OUTPATIENT
Start: 2020-05-21

## 2020-05-21 RX ORDER — THIAMINE MONONITRATE (VIT B1) 100 MG
100 TABLET ORAL DAILY
Status: DISCONTINUED | OUTPATIENT
Start: 2020-05-22 | End: 2020-05-28 | Stop reason: HOSPADM

## 2020-05-21 RX ORDER — CLOPIDOGREL BISULFATE 75 MG/1
75 TABLET ORAL DAILY
Status: DISCONTINUED | OUTPATIENT
Start: 2020-05-22 | End: 2020-05-28 | Stop reason: HOSPADM

## 2020-05-21 RX ADMIN — ENOXAPARIN SODIUM 30 MG: 30 INJECTION SUBCUTANEOUS at 08:34

## 2020-05-21 RX ADMIN — DOCUSATE SODIUM 100 MG: 100 CAPSULE, LIQUID FILLED ORAL at 08:34

## 2020-05-21 RX ADMIN — RANOLAZINE 1000 MG: 500 TABLET, FILM COATED, EXTENDED RELEASE ORAL at 21:28

## 2020-05-21 RX ADMIN — MORPHINE SULFATE 1 MG: 2 INJECTION, SOLUTION INTRAMUSCULAR; INTRAVENOUS at 07:48

## 2020-05-21 RX ADMIN — Medication 100 MG: at 08:34

## 2020-05-21 RX ADMIN — SODIUM CHLORIDE, PRESERVATIVE FREE 10 ML: 5 INJECTION INTRAVENOUS at 07:49

## 2020-05-21 RX ADMIN — SODIUM CHLORIDE, PRESERVATIVE FREE 10 ML: 5 INJECTION INTRAVENOUS at 21:28

## 2020-05-21 RX ADMIN — ALBUTEROL SULFATE 2.5 MG: 2.5 SOLUTION RESPIRATORY (INHALATION) at 11:16

## 2020-05-21 RX ADMIN — ALBUTEROL SULFATE 2.5 MG: 2.5 SOLUTION RESPIRATORY (INHALATION) at 07:09

## 2020-05-21 RX ADMIN — CLOPIDOGREL BISULFATE 75 MG: 75 TABLET ORAL at 08:35

## 2020-05-21 RX ADMIN — SERTRALINE HYDROCHLORIDE 100 MG: 100 TABLET ORAL at 08:34

## 2020-05-21 RX ADMIN — DOXYCYCLINE HYCLATE 100 MG: 100 TABLET, COATED ORAL at 12:32

## 2020-05-21 RX ADMIN — ALBUTEROL SULFATE 2.5 MG: 2.5 SOLUTION RESPIRATORY (INHALATION) at 15:11

## 2020-05-21 RX ADMIN — METOPROLOL SUCCINATE 25 MG: 25 TABLET, EXTENDED RELEASE ORAL at 08:34

## 2020-05-21 RX ADMIN — FUROSEMIDE 40 MG: 40 TABLET ORAL at 18:17

## 2020-05-21 RX ADMIN — RANOLAZINE 1000 MG: 500 TABLET, FILM COATED, EXTENDED RELEASE ORAL at 08:34

## 2020-05-21 RX ADMIN — SIMVASTATIN 20 MG: 20 TABLET, FILM COATED ORAL at 21:28

## 2020-05-21 RX ADMIN — FUROSEMIDE 40 MG: 10 INJECTION, SOLUTION INTRAMUSCULAR; INTRAVENOUS at 08:34

## 2020-05-21 ASSESSMENT — PAIN SCALES - GENERAL
PAINLEVEL_OUTOF10: 0
PAINLEVEL_OUTOF10: 7

## 2020-05-21 NOTE — DISCHARGE SUMMARY
vascular surgery        Outstanding Order Results     No orders found from 4/19/2020 to 5/19/2020. Discharge Exam:  HEENT: Normal HEENT exam.    Lungs: There are decreased breath sounds. Heart: Normal rate. Abdomen: Abdomen is soft. Bowel sounds are normal.     Extremities: Decreased range of motion. Neurological: Patient is alert. Pupils:  Pupils are equal, round, and reactive to light. Skin:  Warm.       Disposition: ARU    Patient Instructions:   Current Facility-Administered Medications   Medication Dose Route Frequency Provider Last Rate Last Dose    furosemide (LASIX) tablet 40 mg  40 mg Oral BID Warner Cassidy MD        doxycycline hyclate (VIBRAMYCIN) capsule 100 mg  100 mg Oral 2 times per day Zia Alexander MD        vitamin D (ERGOCALCIFEROL) capsule 50,000 Units  50,000 Units Oral Weekly Monster TRISH Pascual - CNP   50,000 Units at 05/20/20 9078    metoprolol succinate (TOPROL XL) extended release tablet 25 mg  25 mg Oral Daily Zia Alexander MD   25 mg at 05/21/20 0834    docusate sodium (COLACE) capsule 100 mg  100 mg Oral Daily Zia Alexander MD   100 mg at 05/21/20 0834    bisacodyl (DULCOLAX) EC tablet 5 mg  5 mg Oral Daily PRN Zia Alexander MD        albuterol (PROVENTIL) nebulizer solution 2.5 mg  2.5 mg Nebulization 4x daily Hughes Ganser, MD   2.5 mg at 05/21/20 0709    simvastatin (ZOCOR) tablet 20 mg  20 mg Oral Nightly Hughes Ganser, MD   20 mg at 05/20/20 2111    ranolazine (RANEXA) extended release tablet 1,000 mg  1,000 mg Oral BID Hughes Ganser, MD   1,000 mg at 05/21/20 0834    sertraline (ZOLOFT) tablet 100 mg  100 mg Oral Daily Hughes Ganser, MD   100 mg at 05/21/20 0834    vitamin B-1 (THIAMINE) tablet 100 mg  100 mg Oral Daily Hughes Ganser, MD   100 mg at 05/21/20 0834    clopidogrel (PLAVIX) tablet 75 mg  75 mg Oral Daily Hughes Ganser, MD   75 mg at 05/21/20 0835    oxyCODONE-acetaminophen (PERCOCET) 5-325 MG per tablet 1 tablet

## 2020-05-21 NOTE — PROGRESS NOTES
Physical Therapy    Physical Therapy Treatment Note  Name: Gurwinder Quinn MRN: [id number] :   1935   Date:  2020   Admission Date: 2020 Room:  30273027-A   Restrictions/Precautions:  Restrictions/Precautions  Restrictions/Precautions: General Precautions, Fall Risk       Communication with other providers:  Handoff to RN  Subjective:  Patient states:  \"Where's today's paper? \"  Pain:   Location, Type, Intensity (0/10 to 10/10): Denies  Objective:    Observation:  Supine in bed  Treatment, including education/measures:  Sup to sit: use of bed features, SBA, effortful, use of bedrail but with inefficient body mechanics, provided verbal cues for sequencing and technique patient either not hearing or not following cues. Transfers: STS CGA with RW, min instability. Gait: ambulated with RW, CGA, does demonstrates min instability but no formal LOB during session. X50ft, x35, x25, x50, standing rest breaks between trials with cues for pursed lip breathing. Safety: left in chair with chair alarm, call light within reach, gait belt used, RN notified. Assessment / Impression:       Patient's tolerance of treatment:  Tolerated well   Adverse Reaction: SOB  Significant change in status and impact:  Improved mobility  Barriers to improvement:  Balance, cognition, endurance  Plan for Next Session:    Cont POC. Time in:  1049  Time out:  1108  Timed treatment minutes:  19  Total treatment time:  19    Previously filed items:  Social/Functional History  Lives With: Spouse  Home Equipment: Rolling walker, Quad cane  ADL Assistance: Independent  Ambulation Assistance: Independent(c quad cane)  Transfer Assistance: Independent  Type of occupation: retired from  [employer]  term goals  Long term goal 1: In one week, pt will complete all bed mobility independently  Long term goal 2: In one week, pt will complete sit <> stand transfers with supervision  Long term goal 3:  In one week, pt will ambulate 400

## 2020-05-21 NOTE — PROGRESS NOTES
Akira Mckeon is a 80 y.o. male patient feels better    Current Facility-Administered Medications   Medication Dose Route Frequency Provider Last Rate Last Dose    vitamin D (ERGOCALCIFEROL) capsule 50,000 Units  50,000 Units Oral Weekly TRISH Higginbotham - CNP   50,000 Units at 05/20/20 1600    metoprolol succinate (TOPROL XL) extended release tablet 25 mg  25 mg Oral Daily Dhara Florez MD   25 mg at 05/20/20 1213    docusate sodium (COLACE) capsule 100 mg  100 mg Oral Daily Dhara Florez MD   100 mg at 05/20/20 1213    bisacodyl (DULCOLAX) EC tablet 5 mg  5 mg Oral Daily PRN Dhara Florez MD        furosemide (LASIX) injection 40 mg  40 mg Intravenous TID Laurian Cogan, MD   40 mg at 05/20/20 2111    albuterol (PROVENTIL) nebulizer solution 2.5 mg  2.5 mg Nebulization 4x daily Tina Marshall MD   2.5 mg at 05/21/20 0709    simvastatin (ZOCOR) tablet 20 mg  20 mg Oral Nightly Tina Marshall MD   20 mg at 05/20/20 2111    ranolazine (RANEXA) extended release tablet 1,000 mg  1,000 mg Oral BID Tina Marshall MD   1,000 mg at 05/20/20 2111    sertraline (ZOLOFT) tablet 100 mg  100 mg Oral Daily Tina Marshall MD   100 mg at 05/20/20 0844    vitamin B-1 (THIAMINE) tablet 100 mg  100 mg Oral Daily Tina Marshall MD   100 mg at 05/20/20 0844    clopidogrel (PLAVIX) tablet 75 mg  75 mg Oral Daily Tina Marshall MD   75 mg at 05/20/20 0844    oxyCODONE-acetaminophen (PERCOCET) 5-325 MG per tablet 1 tablet  1 tablet Oral Q4H PRN Tina Marshall MD   1 tablet at 05/18/20 2244    morphine (PF) injection 1 mg  1 mg Intravenous Q4H PRN Tina Marshall MD   1 mg at 05/21/20 0748    sodium chloride flush 0.9 % injection 10 mL  10 mL Intravenous 2 times per day Tina Marshall MD   10 mL at 05/21/20 0749    sodium chloride flush 0.9 % injection 10 mL  10 mL Intravenous PRN Tina Marshall MD        acetaminophen (TYLENOL) tablet 650 mg  650 mg Oral Q6H PRN Tina Marshall MD        Or -stable  Macrocytosis  -B12 folate level wnl  DVT prophylaxis  -lovenox      Discharge to ARU today  Dieter Diaz MD  5/21/2020

## 2020-05-21 NOTE — CARE COORDINATION
Spoke with Tamika/ARU admissions who states patient is able to admit today (if medically stable). Patient needs COVID testing as his was from an ambulatory setting prior to admission. PS to Dr. Kevin Machuca to request test be ordered. Updated Ludwin Jackson RN. 11:41 AM  Returned call to patient jama/Kassi [phone] to provide update as she left a voicemail for this CM.     2:27 PM  Spoke with patient Handy and updated her about dc plans and answered questions.

## 2020-05-21 NOTE — PROGRESS NOTES
Nephrology Progress Note  5/21/2020 9:41 AM  Subjective:   Admit Date: 5/18/2020  PCP: Kesha Ferraro MD  Interval History: pt appear better and less sob    Diet: DIET CARDIAC; Daily Fluid Restriction: 1500 ml; Low Potassium  Pain is:Mild      Data:   Scheduled Meds:   vitamin D  50,000 Units Oral Weekly    metoprolol succinate  25 mg Oral Daily    docusate sodium  100 mg Oral Daily    furosemide  40 mg Intravenous TID    albuterol  2.5 mg Nebulization 4x daily    simvastatin  20 mg Oral Nightly    ranolazine  1,000 mg Oral BID    sertraline  100 mg Oral Daily    vitamin B-1  100 mg Oral Daily    clopidogrel  75 mg Oral Daily    sodium chloride flush  10 mL Intravenous 2 times per day    enoxaparin  30 mg Subcutaneous Daily     Continuous Infusions:  PRN Meds:bisacodyl, oxyCODONE-acetaminophen, morphine, sodium chloride flush, acetaminophen **OR** acetaminophen, polyethylene glycol, promethazine **OR** [DISCONTINUED] ondansetron  I/O last 3 completed shifts:  In: -   Out: 2525 [Urine:2525]  I/O this shift:  In: -   Out: 125 [Urine:125]    Intake/Output Summary (Last 24 hours) at 5/21/2020 0941  Last data filed at 5/21/2020 0841  Gross per 24 hour   Intake --   Output 2650 ml   Net -2650 ml     CBC:   Recent Labs     05/18/20  1045 05/19/20  0332 05/21/20  0718   WBC 12.9* 12.7* 14.7*   HGB 10.3* 9.6* 10.5*    214 288     BMP:    Recent Labs     05/20/20  1310 05/20/20  2028 05/21/20  0718    136 138   K 5.5* 4.4 4.1    98* 95*   CO2 26 25 27   BUN 33* 35* 33*   CREATININE 1.6* 1.7* 1.7*   GLUCOSE 98 90 101*     Hepatic: No results for input(s): AST, ALT, ALB, BILITOT, ALKPHOS in the last 72 hours. Troponin: No results for input(s): TROPONINI in the last 72 hours. BNP: No results for input(s): BNP in the last 72 hours. Lipids: No results for input(s): CHOL, HDL in the last 72 hours.     Invalid input(s): LDLCALCU  ABGs:   Lab Results   Component Value Date    PO2ART 50 06/08/2014

## 2020-05-21 NOTE — PLAN OF CARE
Problem: Pain:  Description: Pain management should include both nonpharmacologic and pharmacologic interventions. Goal: Pain level will decrease  Description: Pain level will decrease  5/18/2020 2347 by Jackie Beckman RN  Outcome: Ongoing  5/18/2020 2028 by Lamar Rogers. Jose A Garland RN  Outcome: Ongoing  Goal: Control of acute pain  Description: Control of acute pain  5/18/2020 2347 by Jackie Beckman RN  Outcome: Ongoing  5/18/2020 2028 by Lamar Rogers. Jose A Garland RN  Outcome: Ongoing  Goal: Control of chronic pain  Description: Control of chronic pain  5/18/2020 2347 by Jackie Beckman RN  Outcome: Ongoing  5/18/2020 2028 by Lamar Rogers. Jose A Garland RN  Outcome: Ongoing     Problem: Falls - Risk of:  Goal: Will remain free from falls  Description: Will remain free from falls  5/18/2020 2347 by Jackie Beckman RN  Outcome: Ongoing  5/18/2020 2028 by Lamar Rogers. Jose A Garland RN  Outcome: Ongoing  Goal: Absence of physical injury  Description: Absence of physical injury  5/18/2020 2347 by Jackie Beckman RN  Outcome: Ongoing  5/18/2020 2028 by Lamar Rogers. Jose A Garland RN  Outcome: Ongoing     Problem: Infection:  Goal: Will remain free from infection  Description: Will remain free from infection  5/18/2020 2347 by Jackie Beckman RN  Outcome: Ongoing  5/18/2020 2028 by Lamar Rogers. Jose A Garland RN  Outcome: Ongoing     Problem: Daily Care:  Goal: Daily care needs are met  Description: Daily care needs are met  5/18/2020 2347 by Jackie Beckman RN  Outcome: Ongoing  5/18/2020 2028 by Lamar Rogers. Jose A Garland RN  Outcome: Ongoing     Problem: Discharge Planning:  Goal: Patients continuum of care needs are met  Description: Patients continuum of care needs are met  5/18/2020 2347 by Jackie Beckman RN  Outcome: Ongoing  5/18/2020 2028 by Lamar Rogers. Jose A Garland RN  Outcome: Ongoing     Problem:  Activity:  Goal: Mobility will improve  Description: Mobility will improve  5/18/2020 2347 by Jackie Beckman RN  Outcome: Ongoing  5/18/2020 2028 by
Problem: Pain:  Description: Pain management should include both nonpharmacologic and pharmacologic interventions. Goal: Pain level will decrease  Description: Pain level will decrease  Outcome: Ongoing  Goal: Control of acute pain  Description: Control of acute pain  Outcome: Ongoing  Goal: Control of chronic pain  Description: Control of chronic pain  Outcome: Ongoing     Problem: Falls - Risk of:  Goal: Will remain free from falls  Description: Will remain free from falls  Outcome: Ongoing  Goal: Absence of physical injury  Description: Absence of physical injury  Outcome: Ongoing     Problem: Infection:  Goal: Will remain free from infection  Description: Will remain free from infection  Outcome: Ongoing     Problem: Daily Care:  Goal: Daily care needs are met  Description: Daily care needs are met  Outcome: Ongoing     Problem: Discharge Planning:  Goal: Patients continuum of care needs are met  Description: Patients continuum of care needs are met  Outcome: Ongoing     Problem:  Activity:  Goal: Mobility will improve  Description: Mobility will improve  Outcome: Ongoing     Problem: Physical Regulation:  Goal: Complications related to the disease process, condition or treatment will be avoided or minimized  Description: Complications related to the disease process, condition or treatment will be avoided or minimized  Outcome: Ongoing
Problem: Pain:  Goal: Pain level will decrease  Description: Pain level will decrease  Outcome: Ongoing  Goal: Control of acute pain  Description: Control of acute pain  Outcome: Ongoing  Goal: Control of chronic pain  Description: Control of chronic pain  Outcome: Ongoing     Problem: Falls - Risk of:  Goal: Will remain free from falls  Description: Will remain free from falls  Outcome: Ongoing  Goal: Absence of physical injury  Description: Absence of physical injury  Outcome: Ongoing     Problem: Infection:  Goal: Will remain free from infection  Description: Will remain free from infection  Outcome: Ongoing     Problem: Daily Care:  Goal: Daily care needs are met  Description: Daily care needs are met  Outcome: Ongoing     Problem: Discharge Planning:  Goal: Patients continuum of care needs are met  Description: Patients continuum of care needs are met  Outcome: Ongoing     Problem:  Activity:  Goal: Mobility will improve  Description: Mobility will improve  Outcome: Ongoing     Problem: Physical Regulation:  Goal: Complications related to the disease process, condition or treatment will be avoided or minimized  Description: Complications related to the disease process, condition or treatment will be avoided or minimized  Outcome: Ongoing
treatment will be avoided or minimized  Description: Complications related to the disease process, condition or treatment will be avoided or minimized  5/19/2020 2332 by Shantel De Leon RN  Outcome: Ongoing  5/19/2020 1612 by Nisha Cherry RN  Outcome: Ongoing

## 2020-05-22 LAB
ANION GAP SERPL CALCULATED.3IONS-SCNC: 13 MMOL/L (ref 4–16)
BUN BLDV-MCNC: 33 MG/DL (ref 6–23)
CALCIUM SERPL-MCNC: 8.8 MG/DL (ref 8.3–10.6)
CHLORIDE BLD-SCNC: 94 MMOL/L (ref 99–110)
CO2: 29 MMOL/L (ref 21–32)
CREAT SERPL-MCNC: 1.7 MG/DL (ref 0.9–1.3)
GFR AFRICAN AMERICAN: 47 ML/MIN/1.73M2
GFR NON-AFRICAN AMERICAN: 39 ML/MIN/1.73M2
GLUCOSE BLD-MCNC: 98 MG/DL (ref 70–99)
POTASSIUM SERPL-SCNC: 3.9 MMOL/L (ref 3.5–5.1)
SODIUM BLD-SCNC: 136 MMOL/L (ref 135–145)

## 2020-05-22 PROCEDURE — 6370000000 HC RX 637 (ALT 250 FOR IP): Performed by: PHYSICAL MEDICINE & REHABILITATION

## 2020-05-22 PROCEDURE — 97110 THERAPEUTIC EXERCISES: CPT

## 2020-05-22 PROCEDURE — 94640 AIRWAY INHALATION TREATMENT: CPT

## 2020-05-22 PROCEDURE — 6360000002 HC RX W HCPCS: Performed by: HOSPITALIST

## 2020-05-22 PROCEDURE — 97530 THERAPEUTIC ACTIVITIES: CPT

## 2020-05-22 PROCEDURE — 97166 OT EVAL MOD COMPLEX 45 MIN: CPT

## 2020-05-22 PROCEDURE — 1200000000 HC SEMI PRIVATE

## 2020-05-22 PROCEDURE — 99232 SBSQ HOSP IP/OBS MODERATE 35: CPT | Performed by: PHYSICAL MEDICINE & REHABILITATION

## 2020-05-22 PROCEDURE — 97112 NEUROMUSCULAR REEDUCATION: CPT

## 2020-05-22 PROCEDURE — 97116 GAIT TRAINING THERAPY: CPT

## 2020-05-22 PROCEDURE — 2700000000 HC OXYGEN THERAPY PER DAY

## 2020-05-22 PROCEDURE — 97162 PT EVAL MOD COMPLEX 30 MIN: CPT

## 2020-05-22 PROCEDURE — 2580000003 HC RX 258: Performed by: HOSPITALIST

## 2020-05-22 PROCEDURE — 80048 BASIC METABOLIC PNL TOTAL CA: CPT

## 2020-05-22 PROCEDURE — 36415 COLL VENOUS BLD VENIPUNCTURE: CPT

## 2020-05-22 PROCEDURE — 6370000000 HC RX 637 (ALT 250 FOR IP): Performed by: HOSPITALIST

## 2020-05-22 PROCEDURE — 1280000000 HC REHAB R&B

## 2020-05-22 PROCEDURE — 94761 N-INVAS EAR/PLS OXIMETRY MLT: CPT

## 2020-05-22 PROCEDURE — 97535 SELF CARE MNGMENT TRAINING: CPT

## 2020-05-22 RX ADMIN — ALBUTEROL SULFATE 2.5 MG: 2.5 SOLUTION RESPIRATORY (INHALATION) at 08:33

## 2020-05-22 RX ADMIN — METOPROLOL SUCCINATE 25 MG: 25 TABLET, EXTENDED RELEASE ORAL at 08:06

## 2020-05-22 RX ADMIN — DOCUSATE SODIUM 100 MG: 100 CAPSULE, LIQUID FILLED ORAL at 08:06

## 2020-05-22 RX ADMIN — RANOLAZINE 1000 MG: 500 TABLET, FILM COATED, EXTENDED RELEASE ORAL at 08:06

## 2020-05-22 RX ADMIN — SERTRALINE 100 MG: 100 TABLET, FILM COATED ORAL at 08:06

## 2020-05-22 RX ADMIN — DOXYCYCLINE HYCLATE 100 MG: 100 TABLET, COATED ORAL at 08:06

## 2020-05-22 RX ADMIN — CLOPIDOGREL BISULFATE 75 MG: 75 TABLET ORAL at 08:06

## 2020-05-22 RX ADMIN — RANOLAZINE 1000 MG: 500 TABLET, FILM COATED, EXTENDED RELEASE ORAL at 21:10

## 2020-05-22 RX ADMIN — FUROSEMIDE 40 MG: 40 TABLET ORAL at 17:21

## 2020-05-22 RX ADMIN — ALBUTEROL SULFATE 2.5 MG: 2.5 SOLUTION RESPIRATORY (INHALATION) at 19:35

## 2020-05-22 RX ADMIN — Medication 100 MG: at 08:06

## 2020-05-22 RX ADMIN — ALBUTEROL SULFATE 2.5 MG: 2.5 SOLUTION RESPIRATORY (INHALATION) at 16:11

## 2020-05-22 RX ADMIN — SIMVASTATIN 20 MG: 20 TABLET, FILM COATED ORAL at 21:10

## 2020-05-22 RX ADMIN — ACETAMINOPHEN 650 MG: 325 TABLET ORAL at 09:45

## 2020-05-22 RX ADMIN — ALBUTEROL SULFATE 2.5 MG: 2.5 SOLUTION RESPIRATORY (INHALATION) at 12:08

## 2020-05-22 RX ADMIN — SODIUM CHLORIDE, PRESERVATIVE FREE 10 ML: 5 INJECTION INTRAVENOUS at 08:08

## 2020-05-22 RX ADMIN — ENOXAPARIN SODIUM 30 MG: 30 INJECTION SUBCUTANEOUS at 08:07

## 2020-05-22 RX ADMIN — FUROSEMIDE 40 MG: 40 TABLET ORAL at 08:06

## 2020-05-22 RX ADMIN — DOXYCYCLINE HYCLATE 100 MG: 100 TABLET, COATED ORAL at 21:10

## 2020-05-22 ASSESSMENT — PAIN SCALES - GENERAL: PAINLEVEL_OUTOF10: 5

## 2020-05-22 NOTE — PROGRESS NOTES
Case Management Admission Note      Patient:Fred Melony Sicard      CJD:[id number]  [facility]:[id number]  Rehab Dx/Hx: Hyperkalemia [E87.5]    Chief Complaint:   Past Medical History:   Diagnosis Date    Atrial fibrillation ([address]     CAD (coronary artery disease)     S/P CABG x 4 6/2014    CH[facility]congestive [facility] failure) [facility]     Chronic back pain     Excessive daytime sleepiness 9/19/2018    Fractured rib 4th and 7th left rib    fracture 4th and 7th ribs    H/O cardiovascular stress test 5/22/2014    EF 50%. There is evidence of mild to moderate ischemia in the mid inferolateral regions.  H/O diagnostic tests 01/27/2011    Aortic Duplex scan. Normal study.  H/O Doppler ultrasound 07/19/2017    Carotid-Moderate disease of the Bilateral internal carotid arteries. Calcific plaque noted throughout.  H/O echocardiogram 7/19/17,6/3/14    EF 55-60% Mild AS Aortic valve leaflets are somewhat thickened. Mildly enlarged right atrium size and dimension.  History of echocardiogram 12/14/2016    LV function is normal. Mild aortic Stenosis noted.  History of nuclear stress test 7/18/17, 7/31/2018    Normal LV function. Normal study. EF 45%.  Hx of Doppler echocardiogram 05/08/2020    EF35-40%,moderately dilated left atrium,mild aortic stenosis,mild aortic regurg,moderate mitral and tricuspid regurg    Hx of echocardiogram 1/26/16    EF 40%, depressed LV function, Moderate LVH, moderate to severe aortic insufficiency and mild aortic stenosis.  Hyperlipidemia     Hypertension     Obstructive sleep apnea 10/29/2018     Past Surgical History:   Procedure Laterality Date    APPENDECTOMY      CHOLECYSTECTOMY      CORONARY ANGIOPLASTY WITH STENT PLACEMENT  2006    stent to the LAD and Circ    CORONARY ARTERY BYPASS GRAFT  6/4/14    CABG x4; LIMA to LAD & diag, SVG to CX marginal 1, SVG to CX marginal 2    DIAGNOSTIC CARDIAC CATH LAB PROCEDURE  03/07/2006    Normal LM, 60-70% ostial and prox LAD, 30% prox.

## 2020-05-22 NOTE — PROGRESS NOTES
Nutrition Assessment    Type and Reason for Visit: Initial, Consult(rehab admit )    Nutrition Recommendations:   Continue current diet per order -cardiac low potassium     Nutrition Assessment: Admit with hx fall and rib fractures. Currently on cardiac diet with low potassium, 1500 ml fluid restriction. Meal intake %. Reasonable intake, no recent weight loss. Low nutrition risk at this time.      Malnutrition Assessment:  · Malnutrition Status: Insufficient data  · Context: Acute illness or injury    Nutrition Risk Level: Low    Nutrient Needs:  · Estimated Daily Total Kcal: 2730-6131 (20-25 brianne/kg current weight)   · Estimated Daily Protein (g): 64-76 (1-1.2 g/kg IBW)   · Estimated Daily Total Fluid (ml/day): 1600 -2000 (1ml/brianne)     Nutrition Diagnosis:   · Problem: Altered nutrition-related lab values  · Etiology: related to Renal dysfunction     Signs and symptoms:  as evidenced by Lab values    Objective Information:  · Nutrition-Focused Physical Findings: Up in chair for lunch, elevated potassium during admit with need for low potassium diet, hx CKD   · Wound Type: None  · Current Nutrition Therapies:  · Oral Diet Orders: Cardiac, Low Potassium, Fluid Restriction   · Oral Diet intake: %  · Oral Nutrition Supplement (ONS) Orders: None  · ONS intake:    · Anthropometric Measures:  · Ht: 5' 6\" (167.6 cm)   · Current Body Wt: 183 lb 3.2 oz (83.1 kg)  · Admission Body Wt: 183 lb 3.2 oz (83.1 kg)  · Usual Body Wt: (hx range 185-189# )  · % Weight Change:  ,  no loss reported, no significant change per hx   · Ideal Body Wt: 142 lb (64.4 kg), % Ideal Body 129  · BMI Classification: BMI 25.0 - 29.9 Overweight(BMI-29.6 )    Nutrition Interventions:   Continue current diet  Continued Inpatient Monitoring, Education not appropriate at this time, Coordination of Care    Nutrition Evaluation:   · Evaluation: Goals set   · Goals: Patient will continue to consume at least 75% at meals     · Monitoring: Meal

## 2020-05-22 NOTE — H&P
Nyasia Lu    : 1935  Acct #: [de-identified]  MRN: [id number]              History and physical      Admitting diagnosis: Debility due to hyperkalemia    Comorbid diagnoses impacting rehabilitation: Acute kidney injury, rib fractures (left fourth-7), bronchiolitis, paroxysmal atrial fibrillation, congestive congestive heart failure, uncontrolled pain, generalized weakness    Chief complaint: Some shortness of breath with exertion and fatigue. History of present illness: Patient is an 72-year-old right-hand-dominant male who was in his usual state of fair health until 5/15/2020. That day he stumbled walking on some steps and landed awkwardly onto his trunk/chest.  He had immediate pain in his ribs but did not seek medical attention. After 3 days he could no longer get his breath or care for himself so he presented to our ED on 2020. He was found to have multiple fractures in the left chest (4-7) as well as significant hyperkalemia. He was treated for acute kidney injury with fluid resuscitation and medication adjustments. He had electrolyte disturbance, congestive heart failure and prerenal azotemia. He has been rehydrated and his chemistries are improving but he still is unable to do his own toileting, dressing or ambulation. He is unsafe to return home and requires inpatient rehabilitation. Review of systems: He has an occasional cough, dyspnea with exertion and left thoracic chest wall pain. His appetite is been fair. He is sleeping fair. He has positional dizziness. He denies new numbness or tingling of the limbs. Bowel movements are infrequent. He controls his bladder. The remainder of their review of systems was negative except as mentioned in the history of present illness. Social History: The patient is  and lives with his wife in a two-step entry single-story home with a tub shower combination for bathing and a standard height commode.   There are grab bars and a shower chair in the bathroom. He has access to a rolling walker and a quad cane but typically uses the walker in the home. He does his own personal hygiene, dressing and medication administration. He shares homemaking responsibilities with his wife. He drives locally. He reports that he quit smoking about 46 years ago. He has a 25.00 pack-year smoking history. He has never used smokeless tobacco. He reports that he does not drink alcohol or use drugs. Prior (baseline) level of function: Modified independent with mobility and self-care. Current level of function: Mild to moderate physical assistance needed for mobility and self-care. Allergies:  Patient has no known allergies. Past Medical History:   Past Medical History:   Diagnosis Date    Atrial fibrillation ([facility] [address]     CAD (coronary artery disease)     S/P CABG x 4 6/2014    [facility]F [facility]congestive heart failure) ([facility])     Chronic back pain     Excessive daytime sleepiness 9/19/2018    Fractured rib 4th and 7th left rib    fracture 4th and 7th ribs    H/O cardiovascular stress test 5/22/2014    EF 50%. There is evidence of mild to moderate ischemia in the mid inferolateral regions.  H/O diagnostic tests 01/27/2011    Aortic Duplex scan. Normal study.  H/O Doppler ultrasound 07/19/2017    Carotid-Moderate disease of the Bilateral internal carotid arteries. Calcific plaque noted throughout.  H/O echocardiogram 7/19/17,6/3/14    EF 55-60% Mild AS Aortic valve leaflets are somewhat thickened. Mildly enlarged right atrium size and dimension.  History of echocardiogram 12/14/2016    LV function is normal. Mild aortic Stenosis noted.  History of nuclear stress test 7/18/17, 7/31/2018    Normal LV function. Normal study. EF 45%.     Hx of Doppler echocardiogram 05/08/2020    EF35-40%,moderately dilated left atrium,mild aortic stenosis,mild aortic regurg,moderate mitral and tricuspid regurg    Hx of echocardiogram 1/26/16    EF 40%, 05/23/20 0149    ranolazine (RANEXA) extended release tablet 1,000 mg, 1,000 mg, Oral, BID, Zia Alexander MD, 1,000 mg at 05/23/20 0846    sertraline (ZOLOFT) tablet 100 mg, 100 mg, Oral, Daily, Zia Alexander MD, 100 mg at 05/23/20 0849    simvastatin (ZOCOR) tablet 20 mg, 20 mg, Oral, Nightly, Zia Alexander MD, 20 mg at 05/22/20 2110    vitamin B-1 (THIAMINE) tablet 100 mg, 100 mg, Oral, Daily, Zia Alexander MD, 100 mg at 05/23/20 0849    [START ON 5/27/2020] vitamin D (ERGOCALCIFEROL) capsule 50,000 Units, 50,000 Units, Oral, Weekly, Zia Alexander MD    enoxaparin (LOVENOX) injection 30 mg, 30 mg, Subcutaneous, Daily, Zai Alexander MD, 30 mg at 05/23/20 0846    polyethylene glycol (GLYCOLAX) packet 17 g, 17 g, Oral, Daily PRN, Zia Alexander MD    promethazine (PHENERGAN) tablet 12.5 mg, 12.5 mg, Oral, Q6H PRN, Zia Alexander MD    acetaminophen (TYLENOL) tablet 650 mg, 650 mg, Oral, Q4H PRN, ARSALAN Roberto MD, 650 mg at 05/22/20 0945    bisacodyl (DULCOLAX) suppository 10 mg, 10 mg, Rectal, Daily PRN, ARSALAN Roberto MD    Family History:   Family History   Problem Relation Age of Onset    Heart Disease Father        Exam:    Blood pressure (!) 188/75, pulse 74, temperature 97.8 °F (36.6 °C), temperature source Oral, resp. rate 18, height 5' 6\" (1.676 m), weight 183 lb 4 oz (83.1 kg), SpO2 96 %. Blood pressure (!) 138/51, pulse (!) 39, temperature 97.6 °F (36.4 °C), temperature source Oral, resp. rate 16, height 5' 6\" (1.676 m), weight 183 lb 4 oz (83.1 kg), SpO2 90 %. General: Patient was seen sitting up in bed. He has oxygen per nasal cannula. He is alert and oriented but soft-spoken. Easily distracted. Fair attention. HEENT: MMM. Neck supple. No JVD. No signs of trauma to his head or neck. Visual fields full. No adenopathy. Pulmonary: Diminished breath sounds in the bases of both lungs. Guarded deep inspiration with chest wall pain. No rales.     Cardiac: Occasional premature beat

## 2020-05-22 NOTE — PROGRESS NOTES
Physical Therapy  [facility] ARU PHYSICAL THERAPY EVALUATION    Chart Review:  Past Medical History:   Diagnosis Date    Atrial fibrillation ([address]     CAD (coronary artery disease)     S/P CABG x 4 6/2014    CHF (congestive heart failure) ([facility])     Chronic back pain     Excessive daytime sleepiness 9/19/2018    Fractured rib 4th and 7th left rib    fracture 4th and 7th ribs    H/O cardiovascular stress test 5/22/2014    EF 50%. There is evidence of mild to moderate ischemia in the mid inferolateral regions.  H/O diagnostic tests 01/27/2011    Aortic Duplex scan. Normal study.  H/O Doppler ultrasound 07/19/2017    Carotid-Moderate disease of the Bilateral internal carotid arteries. Calcific plaque noted throughout.  H/O echocardiogram 7/19/17,6/3/14    EF 55-60% Mild AS Aortic valve leaflets are somewhat thickened. Mildly enlarged right atrium size and dimension.  History of echocardiogram 12/14/2016    LV function is normal. Mild aortic Stenosis noted.  History of nuclear stress test 7/18/17, 7/31/2018    Normal LV function. Normal study. EF 45%.  Hx of Doppler echocardiogram 05/08/2020    EF35-40%,moderately dilated left atrium,mild aortic stenosis,mild aortic regurg,moderate mitral and tricuspid regurg    Hx of echocardiogram 1/26/16    EF 40%, depressed LV function, Moderate LVH, moderate to severe aortic insufficiency and mild aortic stenosis.  Hyperlipidemia     Hypertension     Obstructive sleep apnea 10/29/2018     Past Surgical History:   Procedure Laterality Date    APPENDECTOMY      CHOLECYSTECTOMY      CORONARY ANGIOPLASTY WITH STENT PLACEMENT  2006    stent to the LAD and Circ    CORONARY ARTERY BYPASS GRAFT  6/4/14    CABG x4; LIMA to LAD & diag, SVG to CX marginal 1, SVG to CX marginal 2    DIAGNOSTIC CARDIAC CATH LAB PROCEDURE  03/07/2006    Normal LM, 60-70% ostial and prox LAD, 30% prox. 1st diag, 60% prox.  circumflex (co-dominant vessel and collateralizes RCA), 80% OM2, 100% occluded RCA, very small PDA that fills by collaterals from CX, small abd aortic aneurysm, mild MR, normal aortic arch and origin of great vessels    EYE SURGERY Right     Cataract Extraction    EYE SURGERY Left 11/21/2014    Cataract Extraction    HERNIA REPAIR      Umbilical    TONSILLECTOMY Bilateral      Fall History: Pt states 4-5 falls this year, one injury  Social History:  Social/Functional History  Lives With: Spouse  Type of Home: House  Home Layout: Performs ADL's on one level, Able to Live on Main level with bedroom/bathroom  Home Access: Stairs to enter with rails  Entrance Stairs - Number of Steps: 2  Entrance Stairs - Rails: Right  Bathroom Shower/Tub: Tub/Shower unit, Shower chair with back  Bathroom Toilet: Standard  Bathroom Equipment: Grab bars in shower  Home Equipment: Rolling walker, Quad cane  ADL Assistance: Independent  Homemaking Responsibilities: Yes  Meal Prep Responsibility: Secondary(wife does most)  Laundry Responsibility: No  Cleaning Responsibility: Secondary  Bill Paying/Finance Responsibility: No  Shopping Responsibility: Secondary(share with wife, rides cart)  Ambulation Assistance: Independent(Pt states he uses RW more around the house and quad cane to take in car when they go out)  Transfer Assistance: Independent  Active : Yes  Mode of Transportation: Car, SUV  Occupation: Retired  Type of occupation: retired from [address] [place] [address] Avenue: [employer] in[employer] rider, does gardening  Additional Comments: Pt sleeps in flat bed at home. Pt states he has had 4-5 falls in the last year with this admission being the only one with injury.  Pt states usually his R knee will give out due to arthritis and he was told he is not a surgical candidate    Restrictions:  Restrictions/Precautions  Restrictions/Precautions: General Precautions, Fall Risk  Required Braces or Orthoses?: Yes            Pain Level: 5       Objective:  Orientation  Overall Orientation Status: Within Normal Limits        Vision  Vision: Within Functional Limits  Vision Exceptions: Wears glasses at all times  Hearing  Hearing: Within functional limits    ROM:      AROM RLE (degrees)  RLE AROM: WNL     AROM LLE (degrees)  LLE AROM : WNL                 Strength:    Strength RLE  Strength RLE: WFL  Comment: Pt complains of R knee giving out on him at times  Strength LLE  Strength LLE: WFL              Bed Mobility:   Lying to Sitting on Side of Bed  Assistance Needed: Supervision or touching assistance  CARE Score: 4  Discharge Goal: Independent  Roll Left and Right  Assistance Needed: Partial/moderate assistance  Comment: rolling to L min due to rib pain, to R independent  CARE Score: 3  Discharge Goal: Independent  Sit to Lying  Assistance Needed: Supervision or touching assistance  CARE Score: 4  Discharge Goal: Independent    Transfers:    Sit to Stand  Assistance Needed: Supervision or touching assistance  CARE Score: 4  Discharge Goal: Independent  Chair/Bed-to-Chair Transfer  Assistance Needed: Independent  CARE Score: 6  Discharge Goal: Independent     Car Transfer  Assistance Needed: Supervision or touching assistance  CARE Score: 4  Discharge Goal: Independent    Ambulation:    Walking Ability  Does the Patient Walk?: Yes     Walk 10 Feet  Assistance Needed: Supervision or touching assistance  Physical Assistance Level: No physical assistance  Comment: supervision  CARE Score: 4  Discharge Goal: Independent     Walk 50 Feet with Two Turns  Assistance Needed: Supervision or touching assistance  Physical Assistance Level: No physical assistance  CARE Score: 4  Discharge Goal: Independent     Walk 150 Feet  Assistance Needed: Supervision or touching assistance  Physical Assistance Level: No physical assistance  CARE Score: 4  Discharge Goal: Independent     Walking 10 Feet on Uneven Surfaces  Assistance Needed: Supervision or touching assistance  Physical Assistance Level: Less than 25%  CARE Score: 4  Discharge Goal: Independent     1 Step (Curb)  Assistance Needed: Supervision or touching assistance  Physical Assistance Level: No physical assistance  CARE Score: 4  Discharge Goal: Independent     4 Steps  Assistance Needed: Supervision or touching assistance  Physical Assistance Level: Less than 25%  CARE Score: 4  Discharge Goal: Independent     12 Steps  Assistance Needed: Supervision or touching assistance  Physical Assistance Level: Less than 25%  Comment: O2 92% on room air  CARE Score: 4  Discharge Goal: Supervision or touching assistance    Gait Deviations: []None []Slow kaylee  [] Increased CORY  [] Staggers []Deviated Path  [x] Decreased step length  [] Decreased step height  []Decreased arm swing  [] Shuffles  [x] Decreased head and trunk rotation  [x]other: increased lateral sway as faituges       Wheelchair:  Wheelchair Ability  Uses a Wheelchair and/or Scooter?: No                Balance:    Balance  Standing - Static: Fair, +  Standing - Dynamic: Fair   Object: Picking Up Object  Assistance Needed: Supervision or touching assistance  Physical Assistance Level: No physical assistance  CARE Score: 4  Discharge Goal: Independent    Assessment:   The patient is a 80year old male admitted onto ARU after hospitalization for fall with rib fractures and lung contusion causing decrease in mobility and activity tolerance, requiring supplemental O2. Pt was fully independent at home using cane or RW with mild SOB. Pt now has increased balance difficulty, decreased activity tolerance, increased reliance on supplemental O2 and assistive devices.  Pt will benefit from PT to return to home mod I.      Body structures, Functions, Activity limitations: Decreased functional mobility , Decreased high-level IADLs, Decreased endurance, Decreased balance, Decreased safe awareness     Prognosis: Excellent  Decision Making: Medium Complexity  Clinical Presentation: evolving with predicatable characteristics      Patient education:   ARU schedule, ARU expectations for participation, plan of care, incentive spirometer  Treatment Initiated:  Functional mobility training, gait training, patient education, breathing ex, balance training  Barriers to Improvement: activity tolerance  Discharge Recommendations:  Home with [facility], intermittent supervision  Equipment Recommendations:  Pt has needed equipment    Goals:        Long term goals  Time Frame for Long term goals : 5-7 days  Long term goal 1:  pt will complete all bed mobility independently  Long term goal 2:  pt will complete sit <> stand transfers with supervision  Long term goal 3: , pt will ambulate 400 feet with mod I with cane vs 2ww and no adverse cardiopulmonary issues  Long term goal 4: pt will ascend/descend 5 steps with a handrail with mod I  Long term goal 5: Pt will retrieve item from floor with mod I  Plan:    REQUIRES PT FOLLOW UP: Yes  Pt will be seen at least 60 minutes per day for a minimum of 5 days per week, plus group therapy as appropriate  Plan  Current Treatment Recommendations: Strengthening, ROM, Balance Training, Functional Mobility Training, Stair training, IADL Training, Gait Training, Transfer Training, Endurance Training, ADL/Self-care Training, Neuromuscular Re-education, Patient/Caregiver Education & Training, Pain Management, Equipment Evaluation, Education, & procurement, Home Exercise Program, Safety Education & Training    PT Individual Minutes  Time In: 1206  Time Out: 6524  Minutes: 90             Number of Minutes/Billable Intervention  PT Evaluation 15   Gait Training 40   Therapeutic Exercise 15   Neuro Re-Ed 10   Therapeutic Activity 10   Wheelchair Propulsion    Group    Other:    TOTAL 90       Electronically signed by:    Danish Hernandez, PT   5/22/2020,

## 2020-05-22 NOTE — PLAN OF CARE
Problem: Falls - Risk of:  Goal: Will remain free from falls  Outcome: Ongoing  Goal: Absence of physical injury  Outcome: Ongoing     Problem: Infection:  Goal: Will remain free from infection  Outcome: Ongoing     Problem: Safety:  Goal: Free from accidental physical injury  Outcome: Ongoing  Goal: Free from intentional harm  Outcome: Ongoing     Problem: Daily Care:  Goal: Daily care needs are met  Outcome: Ongoing     Problem: Pain:  Goal: Patient's pain/discomfort is manageable  Outcome: Ongoing     Problem: Skin Integrity:  Goal: Skin integrity will stabilize  Outcome: Ongoing     Problem: Discharge Planning:  Goal: Patients continuum of care needs are met  Outcome: Ongoing

## 2020-05-23 PROBLEM — J21.9 BRONCHIOLITIS: Status: ACTIVE | Noted: 2017-12-26

## 2020-05-23 LAB
ANION GAP SERPL CALCULATED.3IONS-SCNC: 15 MMOL/L (ref 4–16)
BUN BLDV-MCNC: 39 MG/DL (ref 6–23)
CALCIUM SERPL-MCNC: 8.5 MG/DL (ref 8.3–10.6)
CHLORIDE BLD-SCNC: 95 MMOL/L (ref 99–110)
CO2: 28 MMOL/L (ref 21–32)
CREAT SERPL-MCNC: 1.8 MG/DL (ref 0.9–1.3)
GFR AFRICAN AMERICAN: 44 ML/MIN/1.73M2
GFR NON-AFRICAN AMERICAN: 36 ML/MIN/1.73M2
GLUCOSE BLD-MCNC: 96 MG/DL (ref 70–99)
POTASSIUM SERPL-SCNC: 3.7 MMOL/L (ref 3.5–5.1)
SODIUM BLD-SCNC: 138 MMOL/L (ref 135–145)

## 2020-05-23 PROCEDURE — 97530 THERAPEUTIC ACTIVITIES: CPT

## 2020-05-23 PROCEDURE — 1200000000 HC SEMI PRIVATE

## 2020-05-23 PROCEDURE — 1280000000 HC REHAB R&B

## 2020-05-23 PROCEDURE — 97112 NEUROMUSCULAR REEDUCATION: CPT

## 2020-05-23 PROCEDURE — 94640 AIRWAY INHALATION TREATMENT: CPT

## 2020-05-23 PROCEDURE — 97110 THERAPEUTIC EXERCISES: CPT

## 2020-05-23 PROCEDURE — 6360000002 HC RX W HCPCS: Performed by: HOSPITALIST

## 2020-05-23 PROCEDURE — 6370000000 HC RX 637 (ALT 250 FOR IP): Performed by: PHYSICAL MEDICINE & REHABILITATION

## 2020-05-23 PROCEDURE — 6370000000 HC RX 637 (ALT 250 FOR IP): Performed by: HOSPITALIST

## 2020-05-23 PROCEDURE — 2700000000 HC OXYGEN THERAPY PER DAY

## 2020-05-23 PROCEDURE — 97116 GAIT TRAINING THERAPY: CPT

## 2020-05-23 PROCEDURE — 94761 N-INVAS EAR/PLS OXIMETRY MLT: CPT

## 2020-05-23 PROCEDURE — 80048 BASIC METABOLIC PNL TOTAL CA: CPT

## 2020-05-23 PROCEDURE — 36415 COLL VENOUS BLD VENIPUNCTURE: CPT

## 2020-05-23 PROCEDURE — 97535 SELF CARE MNGMENT TRAINING: CPT

## 2020-05-23 RX ORDER — ALBUTEROL SULFATE 2.5 MG/3ML
2.5 SOLUTION RESPIRATORY (INHALATION) EVERY 4 HOURS PRN
Status: DISCONTINUED | OUTPATIENT
Start: 2020-05-23 | End: 2020-05-28 | Stop reason: HOSPADM

## 2020-05-23 RX ADMIN — DOCUSATE SODIUM 100 MG: 100 CAPSULE, LIQUID FILLED ORAL at 08:46

## 2020-05-23 RX ADMIN — OXYCODONE AND ACETAMINOPHEN 1 TABLET: 5; 325 TABLET ORAL at 14:11

## 2020-05-23 RX ADMIN — ACETAMINOPHEN 650 MG: 325 TABLET ORAL at 14:11

## 2020-05-23 RX ADMIN — ENOXAPARIN SODIUM 30 MG: 30 INJECTION SUBCUTANEOUS at 08:46

## 2020-05-23 RX ADMIN — DOXYCYCLINE HYCLATE 100 MG: 100 TABLET, COATED ORAL at 19:39

## 2020-05-23 RX ADMIN — CLOPIDOGREL BISULFATE 75 MG: 75 TABLET ORAL at 08:46

## 2020-05-23 RX ADMIN — RANOLAZINE 1000 MG: 500 TABLET, FILM COATED, EXTENDED RELEASE ORAL at 08:46

## 2020-05-23 RX ADMIN — DOXYCYCLINE HYCLATE 100 MG: 100 TABLET, COATED ORAL at 08:46

## 2020-05-23 RX ADMIN — ALBUTEROL SULFATE 2.5 MG: 2.5 SOLUTION RESPIRATORY (INHALATION) at 07:45

## 2020-05-23 RX ADMIN — Medication 100 MG: at 08:49

## 2020-05-23 RX ADMIN — METOPROLOL SUCCINATE 25 MG: 25 TABLET, EXTENDED RELEASE ORAL at 08:46

## 2020-05-23 RX ADMIN — FUROSEMIDE 40 MG: 40 TABLET ORAL at 17:30

## 2020-05-23 RX ADMIN — FUROSEMIDE 40 MG: 40 TABLET ORAL at 08:46

## 2020-05-23 RX ADMIN — SIMVASTATIN 20 MG: 20 TABLET, FILM COATED ORAL at 19:39

## 2020-05-23 RX ADMIN — SERTRALINE 100 MG: 100 TABLET, FILM COATED ORAL at 08:49

## 2020-05-23 RX ADMIN — RANOLAZINE 1000 MG: 500 TABLET, FILM COATED, EXTENDED RELEASE ORAL at 19:38

## 2020-05-23 RX ADMIN — OXYCODONE AND ACETAMINOPHEN 1 TABLET: 5; 325 TABLET ORAL at 01:49

## 2020-05-23 ASSESSMENT — PAIN SCALES - GENERAL
PAINLEVEL_OUTOF10: 6
PAINLEVEL_OUTOF10: 0
PAINLEVEL_OUTOF10: 0
PAINLEVEL_OUTOF10: 7

## 2020-05-23 ASSESSMENT — PAIN DESCRIPTION - DESCRIPTORS: DESCRIPTORS: STABBING;SHARP

## 2020-05-23 ASSESSMENT — PAIN DESCRIPTION - PAIN TYPE: TYPE: ACUTE PAIN

## 2020-05-23 ASSESSMENT — PAIN DESCRIPTION - LOCATION: LOCATION: RIB CAGE

## 2020-05-23 ASSESSMENT — PAIN DESCRIPTION - FREQUENCY: FREQUENCY: INTERMITTENT

## 2020-05-23 ASSESSMENT — PAIN SCALES - WONG BAKER: WONGBAKER_NUMERICALRESPONSE: 0

## 2020-05-23 ASSESSMENT — PAIN DESCRIPTION - ORIENTATION: ORIENTATION: LEFT

## 2020-05-23 NOTE — PATIENT CARE CONFERENCE
Right  Assistance Needed: Supervision or touching assistance  Comment: rolling to L min due to rib pain, to R independent  CARE Score: 4  Discharge Goal: Independent  Sit to Lying  Assistance Needed: Supervision or touching assistance  CARE Score: 4  Discharge Goal: Independent  Lying to Sitting on Side of Bed  Assistance Needed: Supervision or touching assistance  CARE Score: 4  Discharge Goal: Independent  Sit to Stand  Assistance Needed: Supervision or touching assistance  CARE Score: 4  Discharge Goal: Independent  Chair/Bed-to-Chair Transfer  Assistance Needed: Independent  CARE Score: 6  Discharge Goal: Independent  Toilet Transfer  Assistance Needed: Supervision or touching assistance  CARE Score: 4  Discharge Goal: Independent  Car Transfer  Assistance Needed: Supervision or touching assistance  CARE Score: 4  Discharge Goal: Independent  Walk 10 Feet? Walk 10 Feet?: Yes  1 Step  1 Step?: Yes  Picking Up Object  Assistance Needed: Supervision or touching assistance  Physical Assistance Level: No physical assistance  CARE Score: 4  Discharge Goal: Independent  Wheelchair Ability  Uses a Wheelchair and/or Scooter?: No                  Fall Risk: [x]  Yes  []  No    OCCUPATIONAL THERAPY   Short term goals  Time Frame for Short term goals: STG=LTG :   Long term goals  Time Frame for Long term goals : until pt. is discharged or goals met  Long term goal 1: Pt will complete Indep LB bathing & dressing for Indep ADL functioning  NMET; supervision/setup.   Supervision for toileting, setup for UB dressing and footwear  Long term goal 2: Pt will complete Indep functional transfers ( toilet, chair, tub/shower) for Indep ADL functioning  NMET;  SBA c RW  Long term goal 3: Pt will complete Indep toileting for Indep ADL functioning NMET; supervision    Long term goal 4: Pt will complete Indep ADL setup & simple homemaking using appro AD prn safely for Indep ADL functioning  & mobility NMET; supervision  Long term goal 5: participating in and has a reasonable expectation to continue to benefit from the intensive rehabilitation therapy program   [x]  The estimated discharge date has been changed from initial team conference due to: progress   []  The estimated discharge destination has been changed from initial team conference due to:         Ongoing tx following discharge: [x]HHC PT   []OUTPATIENT     [] No Further Treatment     [] Family/Caregiver Training  []  Transitional Living Arrangement   [] Home Assessment (date  )     [] Family Conference   []  Therapeutic Pass       []  Other: (specify)    Estimated Discharge Date: 5/28/2020    Estimated Discharge Destination: []home alone   []home alone with assist prn  []Continuous supervision [x]Return home with spouse/family   [] Assisted living  []SNF     See team signature page for team members participating in today's conference. Goals have been updated to reflect recent status.       I have led this Team Conference and agree with the plan, Vimal Mejia MD, 5/26/2020, 12:53 PM    Team conference note transcribed this date by: Jade Pompa MA, [id number] [facility], Therapy Coordinator

## 2020-05-23 NOTE — PROGRESS NOTES
Marilu Cardenas    : 1935  Acct #: [de-identified]  MRN: [id number]              PM&R Progress Note      Admitting diagnosis: Debility due to hyperkalemia     Comorbid diagnoses impacting rehabilitation: Acute kidney injury, rib fractures (left fourth-7), bronchiolitis, paroxysmal atrial fibrillation, congestive congestive heart failure, uncontrolled pain, generalized weakness    Chief complaint: Some fatigue with exertion. No new chest pain or dizziness. Prior (baseline) level of function: Independent. Current level of function:         Current  IRF-ESTELA and Goals:   Hearing, Speech, and Vision  Expression of Ideas and Wants: Without difficulty  Understanding Verbal and Non-Verbal Content: Understands  Prior Functioning: Everyday Activities  Self Care: Independent  Indoor Mobility (Ambulation):  Independent  Stairs: Independent  Functional Cognition: Independent  Prior Device Use: Walker    Eating  Assistance Needed: Independent  CARE Score: 6  Discharge Goal: Independent  Oral Hygiene  Assistance Needed: Setup or clean-up assistance  CARE Score: 5  Discharge Goal: Independent  Toileting Hygiene  Assistance Needed: Supervision or touching assistance  CARE Score: 4  Discharge Goal: Independent  Shower/Bathe Self  Assistance Needed: Supervision or touching assistance  CARE Score: 4  Discharge Goal: Independent  Upper Body Dressing  Assistance Needed: Setup or clean-up assistance  CARE Score: 5  Discharge Goal: Independent  Lower Body Dressing  Assistance Needed: Supervision or touching assistance  CARE Score: 4  Discharge Goal: Independent  Putting On/Taking Off Footwear  Assistance Needed: Setup or clean-up assistance  CARE Score: 5  Discharge Goal: Independent    Roll Left and Right  Assistance Needed: Partial/moderate assistance  Comment: rolling to L min due to rib pain, to R independent  CARE Score: 3  Discharge Goal: Independent  Sit to Lying  Assistance Needed: Supervision or touching assistance  CARE Score: 4  Discharge Goal: Independent  Sit to Stand  Assistance Needed: Supervision or touching assistance  CARE Score: 4  Discharge Goal: Independent  Chair/Bed-to-Chair Transfer  Assistance Needed: Independent  CARE Score: 6  Discharge Goal: Independent  Toilet Transfer  Assistance Needed: Supervision or touching assistance  CARE Score: 4  Discharge Goal: Independent  Car Transfer  Assistance Needed: Supervision or touching assistance  CARE Score: 4  Discharge Goal: Independent   Walk 10 Feet? Walk 10 Feet?: Yes  1 Step  1 Step?: Yes  Picking Up Object  Assistance Needed: Supervision or touching assistance  Physical Assistance Level: No physical assistance  CARE Score: 4  Discharge Goal: Independent  Wheelchair Ability  Uses a Wheelchair and/or Scooter?: No                  I      Exam:    Blood pressure (!) 141/60, pulse 79, temperature 99 °F (37.2 °C), temperature source Oral, resp. rate 18, height 5' 6\" (1.676 m), weight 183 lb 4 oz (83.1 kg), SpO2 94 %. General: Up in a wheelchair. Alert. Slightly hard of hearing. HEENT: MMM. Neck supple. Speech clear. Pulmonary: Blunted breath sounds in the bases. No wheezes or rales. Cardiac: Occasional premature beat with controlled rate. Abdomen: Patient's abdomen is soft and nondistended. Bowel sounds were present throughout. There was no rebound, guarding or masses noted. Upper extremities: Slowly brings both hands up to meet mine. No reflexes. Normal tone. Fair dexterity. Lower extremities: Guarded hip flexion due to fatigue and chest pain. 4-/5 lower limb strength otherwise. Sitting balance was good. Standing balance was poor.     Lab Results   Component Value Date    WBC 14.7 (H) 05/21/2020    HGB 10.5 (L) 05/21/2020    HCT 32.0 (L) 05/21/2020    .3 (H) 05/21/2020     05/21/2020     Lab Results   Component Value Date    INR 1.08 08/14/2018    INR 1.30 10/27/2016    INR 1.93 06/20/2014    PROTIME 12.3 08/14/2018

## 2020-05-23 NOTE — PROGRESS NOTES
[x] daily progress note       [] discharge       Patient Name:  Karel Antoine   :  1935 MRN: [id number]  Room:  [address] [place] [address] Date of Admission: 2020  Rehabilitation Diagnosis:   Hyperkalemia [E87.5]       Date 2020       Day of ARU Week:  3   Time IN/OUT 0930/1035  1250/1345   Individual Tx Minutes 65 mins + 55 mins   Group Tx Minutes    Co-Treat Minutes    Concurrent Tx Minutes    TOTAL Tx Time Mins 120 mins   Variance Time    Variance Time []   Refusal due to:     []   Medical hold/reason:    []   Illness   []   Off Unit for test/procedure  []   Extra time needed to complete task  []   Therapeutic need  []   Other (specify):   Restrictions Restrictions/Precautions  Restrictions/Precautions: General Precautions, Fall Risk  Required Braces or Orthoses?: Yes      Communication with other providers: [x]   OK to see per nursing:     [x]   Spoke with team member regarding: Pt's request for pain medication in afternoon    Subjective observations and cognitive status: Pt supine in bed upon therapist arrival with reports of moderately severe ribcage pain prior to activity. Pt reports tolerable & agreeable to therapy session on first attempt. Pain level/location:    7-8/10       Location: B ribcage region (Afternoon session)   Discharge recommendations  Anticipated discharge date:  TBD  Destination: []home alone   []home alone with assist PRN     [] home w/ family      [] Continuous supervision  []SNF    [] Assisted living     [] Other:   Continued therapy: []HHC PT  []OUTPATIENT  PT   [] No Further PT  Equipment needs: TBD     Bed Mobility:           []   Pt received out of bed   Rolling R/L: S  Scooting: S  Lying --> Sit: S  Sit --> lying: S  Transfers:    Sit--> Stand: S  Stand --> Sit: S  Toilet Transfer (if applicable):  S  Assistive device required for transfer: RW/grab bars    Gait:    Distance: First session: Approximately 110 feet x 2, 200 feet, & 135 feet x 2  Second session: Approximately 175 a surgical candidate    Objective                                                                                    Goals:  (Update in navigator)   : Long term goals  Time Frame for Long term goals : 5-7 days  Long term goal 1:  pt will complete all bed mobility independently  Long term goal 2:  pt will complete sit <> stand transfers with supervision  Long term goal 3: , pt will ambulate 400 feet with mod I with cane vs 2ww and no adverse cardiopulmonary issues  Long term goal 4: pt will ascend/descend 5 steps with a handrail with mod I  Long term goal 5: Pt will retrieve item from floor with mod I:        Plan of Care                                                                              Times per week: 5 days per week for a minimum of 60 minutes/day plus group as appropriate for 60 minutes.   Treatment to include Current Treatment Recommendations: Strengthening, ROM, Balance Training, Functional Mobility Training, Stair training, IADL Training, Gait Training, Transfer Training, Endurance Training, ADL/Self-care Training, Neuromuscular Re-education, Patient/Caregiver Education & Training, Pain Management, Equipment Evaluation, Education, & procurement, Home Exercise Program, Safety Education & Training    Electronically signed by   Jaquelin Barron, PTA  5/23/2020, 8:29 AM

## 2020-05-24 LAB
ANION GAP SERPL CALCULATED.3IONS-SCNC: 12 MMOL/L (ref 4–16)
BUN BLDV-MCNC: 46 MG/DL (ref 6–23)
CALCIUM SERPL-MCNC: 8.9 MG/DL (ref 8.3–10.6)
CHLORIDE BLD-SCNC: 95 MMOL/L (ref 99–110)
CO2: 30 MMOL/L (ref 21–32)
CREAT SERPL-MCNC: 2 MG/DL (ref 0.9–1.3)
GFR AFRICAN AMERICAN: 39 ML/MIN/1.73M2
GFR NON-AFRICAN AMERICAN: 32 ML/MIN/1.73M2
GLUCOSE BLD-MCNC: 92 MG/DL (ref 70–99)
POTASSIUM SERPL-SCNC: 4.7 MMOL/L (ref 3.5–5.1)
SODIUM BLD-SCNC: 137 MMOL/L (ref 135–145)

## 2020-05-24 PROCEDURE — 97110 THERAPEUTIC EXERCISES: CPT

## 2020-05-24 PROCEDURE — 80048 BASIC METABOLIC PNL TOTAL CA: CPT

## 2020-05-24 PROCEDURE — 6360000002 HC RX W HCPCS: Performed by: HOSPITALIST

## 2020-05-24 PROCEDURE — 1280000000 HC REHAB R&B

## 2020-05-24 PROCEDURE — 2700000000 HC OXYGEN THERAPY PER DAY

## 2020-05-24 PROCEDURE — 97530 THERAPEUTIC ACTIVITIES: CPT

## 2020-05-24 PROCEDURE — 1200000000 HC SEMI PRIVATE

## 2020-05-24 PROCEDURE — 6370000000 HC RX 637 (ALT 250 FOR IP): Performed by: HOSPITALIST

## 2020-05-24 PROCEDURE — 97116 GAIT TRAINING THERAPY: CPT

## 2020-05-24 PROCEDURE — 97112 NEUROMUSCULAR REEDUCATION: CPT

## 2020-05-24 PROCEDURE — 36415 COLL VENOUS BLD VENIPUNCTURE: CPT

## 2020-05-24 PROCEDURE — 94761 N-INVAS EAR/PLS OXIMETRY MLT: CPT

## 2020-05-24 PROCEDURE — 94150 VITAL CAPACITY TEST: CPT

## 2020-05-24 PROCEDURE — 97535 SELF CARE MNGMENT TRAINING: CPT

## 2020-05-24 RX ADMIN — RANOLAZINE 1000 MG: 500 TABLET, FILM COATED, EXTENDED RELEASE ORAL at 20:50

## 2020-05-24 RX ADMIN — SIMVASTATIN 20 MG: 20 TABLET, FILM COATED ORAL at 20:50

## 2020-05-24 RX ADMIN — METOPROLOL SUCCINATE 25 MG: 25 TABLET, EXTENDED RELEASE ORAL at 07:52

## 2020-05-24 RX ADMIN — DOCUSATE SODIUM 100 MG: 100 CAPSULE, LIQUID FILLED ORAL at 07:52

## 2020-05-24 RX ADMIN — SERTRALINE 100 MG: 100 TABLET, FILM COATED ORAL at 07:53

## 2020-05-24 RX ADMIN — RANOLAZINE 1000 MG: 500 TABLET, FILM COATED, EXTENDED RELEASE ORAL at 07:52

## 2020-05-24 RX ADMIN — DOXYCYCLINE HYCLATE 100 MG: 100 TABLET, COATED ORAL at 20:50

## 2020-05-24 RX ADMIN — Medication 100 MG: at 07:53

## 2020-05-24 RX ADMIN — FUROSEMIDE 40 MG: 40 TABLET ORAL at 18:31

## 2020-05-24 RX ADMIN — OXYCODONE AND ACETAMINOPHEN 1 TABLET: 5; 325 TABLET ORAL at 06:43

## 2020-05-24 RX ADMIN — ENOXAPARIN SODIUM 30 MG: 30 INJECTION SUBCUTANEOUS at 07:53

## 2020-05-24 RX ADMIN — DOXYCYCLINE HYCLATE 100 MG: 100 TABLET, COATED ORAL at 07:52

## 2020-05-24 RX ADMIN — FUROSEMIDE 40 MG: 40 TABLET ORAL at 07:53

## 2020-05-24 RX ADMIN — CLOPIDOGREL BISULFATE 75 MG: 75 TABLET ORAL at 07:53

## 2020-05-24 RX ADMIN — OXYCODONE AND ACETAMINOPHEN 1 TABLET: 5; 325 TABLET ORAL at 20:50

## 2020-05-24 ASSESSMENT — PAIN SCALES - WONG BAKER
WONGBAKER_NUMERICALRESPONSE: 0
WONGBAKER_NUMERICALRESPONSE: 0

## 2020-05-24 ASSESSMENT — PAIN SCALES - GENERAL
PAINLEVEL_OUTOF10: 0
PAINLEVEL_OUTOF10: 5
PAINLEVEL_OUTOF10: 7
PAINLEVEL_OUTOF10: 5

## 2020-05-24 ASSESSMENT — PAIN DESCRIPTION - PROGRESSION: CLINICAL_PROGRESSION: GRADUALLY WORSENING

## 2020-05-24 ASSESSMENT — PAIN DESCRIPTION - ORIENTATION: ORIENTATION: LEFT

## 2020-05-24 ASSESSMENT — PAIN DESCRIPTION - LOCATION: LOCATION: RIB CAGE

## 2020-05-24 ASSESSMENT — PAIN DESCRIPTION - PAIN TYPE: TYPE: ACUTE PAIN

## 2020-05-24 NOTE — PROGRESS NOTES
dynamic standing balance during functional activities with no UE support requiring SBA while reaching out of CORY & crossing midline. VC for erect posture, adjusting CORY, & attention to safety to increase balance to increase (I) with functional activities. Pt performed dynamic sitting balance during functional activities with no UE support requiring SBA while reaching out of CORY & crossing midline. VC for erect posture, adjusting midline, & attention to safety to increase balance/standing tolerance to increase trunk control to increase (I) with functional transfers & functional bed mobility. [x]   Seated ther ex (reps/sets): Pt performed seated B LE AROM 10 x 3 each including ankle PF/DF, knee flex/ext, & hip flex/abd/add. VC for completion of full ROM, eccentric muscle control, & attention to task to increase LE strength & ROM to increase (I) with functional transfers & functional bed mobility. Patient/Caregiver Education and Training:   [x]   Bed Mobility/Transfer technique/safety  [x]   Gait technique/sequencing  []   Proper use of assistive device  []   Advanced mobility safety and technique  []   Reinforced patient's precautions/mobility while maintaining precautions  []   Postural awareness  []   Family training  []   Progress was updated and reviewed in Rehabtracker with patient and/or family this date. Treatment Plan for Next Session: Functional transfers, functional mobility, therapeutic exercises      Assessment: Pt tolerated treatment well requiring SBA/S for all functional transfers & functional mobility.      Treatment/Activity Tolerance:   [x] Tolerated treatment with no adverse effects    [] Patient limited by fatigue  [] Patient limited by pain   [] Patient limited by medical complications:    [] Adverse reaction to Tx:   [] Significant change in status    Safety:       []  bed alarm set    [x]  chair alarm set    []  Pt refused alarms                []  Telesitter activated      [x]

## 2020-05-24 NOTE — PROGRESS NOTES
Occupational Therapy  Physical Rehabilitation: OCCUPATIONAL THERAPY     [x] daily progress note       [] discharge       Patient Name:  Jenelle Strickland   :  1935 MRN: [id number]  Room:  [address] [place] [address] Date of Admission: 2020  Rehabilitation Diagnosis:   Hyperkalemia [E87.5]       Date 2020       Day of ARU Week:  4   Time IN/OUT 3895-5097   Individual Tx Minutes 60   Group Tx Minutes    Co-Treat Minutes    Concurrent Tx Minutes    TOTAL Tx Time Mins 60   Variance Time    Variance Time []   Refusal due to:     []   Medical hold/reason:    []   Illness   []   Off Unit for test/procedure  []   Extra time needed to complete task  []   Therapeutic need  []   Other (specify):   Restrictions Restrictions/Precautions  Restrictions/Precautions: General Precautions, Fall Risk  Required Braces or Orthoses?: Yes      Communication with other providers: [x]   OK to see per nursing:     []   Spoke with team member regarding:      Subjective observations and cognitive status: Pt agreeable to OT therapy.  Pt cooperative and pleasant     Pain level/location:  3  /10       Location: rib area   Discharge recommendations  Anticipated discharge date:  TBD  Destination: []home alone   []home alone w assist prn [] home w/ family    [] Continuous supervision       []SNF            [] Assisted living     [] Other:   Continued therapy: []HHC OT  []OUTPATIENT  OT   [] No Further OT  Equipment needs: TBD   (HIT F2 to transition between stars)    Eating/Feeding:        Extremity Used:        []    R UE []    L UE         Dentures: []   N/A    []    Partial []    Full  Adaptive Equipment Used:        Grooming:   Setup in standing positioning at sink  Oral Hygiene:  Setup in standing positioning at sink      Bathing:   SBA, completed 75% in seated position, while in stance pt require grab bar to maintain balance      Dressing:      Upper Body Dressing:  Setup  Lower Body Dressing:  Setup  Footwear: Setup    Toileting:   Sup for thoroughness       Toilet Transfers:   SBA for hand/RW placement to increase safety  Device Used:    [x]   Standard Toilet         [x]   Rosebud Eh           []  Bedside Commode       []   Elevated Toilet          []   Other:        Tub/Shower Transfer:   CGA with verbal cues for corrected techs   Device Used:    [x]   Shower Bench      []   Shower Chair      []   Tub Transfer Bench           []   Bathtub    []   Shower         []   Other:         Bed Mobility:           [x]   Pt received out of bed   Rolling R/L:    Scooting:    Supine --> Sit:    Sit --> Supine:      Transfers:    Sit--> Stand:  Sup  Stand --> Sit:   Sup  Stand-Pivot:   Sup,  Mod v/c  Staying within RW for BLE scissor gait,   Other:    Assistive device required for transfer:   RW      Functional Mobility:  Within room<>bathroom  Assistance:  Sup  Device:   [x]   Rolling Walker     []   Standard Walker []   Wheelchair        []   [employer]       []   4-Wheeled [employer]ples         []   Cardiac Abbie [employer]ples       []   Other:        Homemaking Tasks:          Additional Therapeutic activities/exercises completed this date:     [x]   ADL Training   [x]   Balance/Postural training     [x]   Bed/Transfer Training inconsistent hand placement and locking w/c when utilizing    []   Endurance Training   []   Neuromuscular Re-ed   []   Nu-step:  Time:        Level:         #Steps:       []   Rebounder:    []  Seated     []  Standing        []   Supine Ther Ex (reps/sets):     []   Seated Ther Ex (reps/sets):  BUE strengthening ex 1x set x15 reps utilizing purple theraband targeting all planes of motion   []   Standing Ther Ex (reps/sets):     []   Other:      Comments:  V/c for corrected techs and pace, Jeraline Chamisal with LUE ex due to rib pain    Patient/Caregiver Education and Training:   [x]   [employer] Equipment Use  [x]   Bed Mobility/Transfer Technique/Safety  []   Energy Conservation Tips  []   Family training  [x]   Postural Awareness  [x]   Safety During Functional

## 2020-05-24 NOTE — PROGRESS NOTES
RW    Uneven Surfaces:       Assistance:    CGA  Device:    RW  Surfaces Completed:   []  Green mat with bean bags beneath      []  Throw rugs       [x]  Ramp       [x]  Outdoor pavements        []  Grass             []  Loose gravel        []  Other:         Additional Therapeutic activities/exercises completed this date:     []   Nu-step:  Time:        Level:         #Steps:       []   Rebounder:    []  Seated     []  Standing        []   Balance training         []   Postural training    []   Supine ther ex (reps/sets):     []   Seated ther ex (reps/sets):     [x]   Standing ther ex (reps/sets):  AROM for BLE of hip abd, hip flexion, and heel raises x 15 reps   [x]   Picking up object from floor (standing):    SBA               [x]   Reacher used 6 beanbags   []   Other:   []   Other:    Comments:      Patient/Caregiver Education and Training:   [x]   Bed Mobility/Transfer technique/safety  [x]   Gait technique/sequencing  [x]   Proper use of assistive device  []   Advanced mobility safety and technique  []   Reinforced patient's precautions/mobility while maintaining precautions  []   Postural awareness  []   Family training  []   Progress was updated and reviewed in Rehabtracker with patient and/or family this date.     Treatment Plan for Next Session:Continue to monitor 02 with ex on RA, increase strength, balance and gait training     Assessment:      Treatment/Activity Tolerance:   [x] Tolerated treatment with no adverse effects    [] Patient limited by fatigue  [] Patient limited by pain   [] Patient limited by medical complications:    [] Adverse reaction to Tx:   [] Significant change in status    Safety:       []  bed alarm set    [x]  chair alarm set    []  Pt refused alarms                []  Telesitter activated      [x]  Gait belt used during tx session      []other:         Number of Minutes/Billable Intervention  Gait Training 15   Therapeutic Exercise 15   Neuro Re-Ed 15   Therapeutic Activity 15 Wheelchair Propulsion    Group    Other:    TOTAL 60         Social History  Social/Functional History  Lives With: Spouse  Type of Home: House  Home Layout: Performs ADL's on one level, Able to Live on Main level with bedroom/bathroom  Home Access: Stairs to enter with rails  Entrance Stairs - Number of Steps: 2  Entrance Stairs - Rails: Right  Bathroom Shower/Tub: Tub/Shower unit, Shower chair with back  Bathroom Toilet: Standard  Bathroom Equipment: Grab bars in [address] [place]: Rolling walker, Quad cane  ADL Assistance: Independent  Homemaking Responsibilities: Yes  Meal Prep Responsibility: Secondary(wife does most)  Laundry Responsibility: No  Cleaning Responsibility: Secondary  Bill Paying/Finance Responsibility: No  Shopping Responsibility: Secondary(share with wife, rides cart)  Ambulation Assistance: Independent(Pt states he uses RW more around the house and quad cane to take in car when they go out)  Transfer Assistance: Independent  Active : Yes  Mode of Transportation: Car, SUV  Occupation: Retired  Type of occupation: retired from [address] [place] [address]: rogelio guevara, does "[employer]  Additional Comments: Pt sleeps in flat bed at home. Pt states he has had 4-5 falls in the last year with this admission being the only one with injury. Pt states usually his R knee will give out due to arthritis and he was told he is not a surgical candidate    Objective                                                                                    Goals:  (Update in navigator)   :   Long term goals  Time Frame for Long term goals : 5-7 days  Long term goal 1:  pt will complete all bed mobility independently  Long term goal 2:  pt will complete sit <> stand transfers with supervision  Long term goal 3: , pt will ambulate 400 feet with mod I with cane vs 2ww and no adverse cardiopulmonary issues  Long term goal 4: pt will ascend/descend 5 steps with a handrail with mod I  Long term goal 5: Pt will retrieve item from floor with mod I:        Plan of Care                                                                              Times per week: 5 days per week for a minimum of 60 minutes/day plus group as appropriate for 60 minutes.   Treatment to include Current Treatment Recommendations: Strengthening, ROM, Balance Training, Functional Mobility Training, Stair training, IADL Training, Gait Training, Transfer Training, Endurance Training, ADL/Self-care Training, Neuromuscular Re-education, Patient/Caregiver Education & Training, Pain Management, Equipment Evaluation, Education, & procurement, Home Exercise Program, Safety Education & Training    Electronically signed by   Susie Dimas, PTA [id number]  5/24/2020, 8:19 AM

## 2020-05-25 LAB
ANION GAP SERPL CALCULATED.3IONS-SCNC: 12 MMOL/L (ref 4–16)
BUN BLDV-MCNC: 52 MG/DL (ref 6–23)
CALCIUM SERPL-MCNC: 8.8 MG/DL (ref 8.3–10.6)
CHLORIDE BLD-SCNC: 97 MMOL/L (ref 99–110)
CO2: 30 MMOL/L (ref 21–32)
CREAT SERPL-MCNC: 1.9 MG/DL (ref 0.9–1.3)
GFR AFRICAN AMERICAN: 41 ML/MIN/1.73M2
GFR NON-AFRICAN AMERICAN: 34 ML/MIN/1.73M2
GLUCOSE BLD-MCNC: 88 MG/DL (ref 70–99)
POTASSIUM SERPL-SCNC: 3.9 MMOL/L (ref 3.5–5.1)
SODIUM BLD-SCNC: 139 MMOL/L (ref 135–145)

## 2020-05-25 PROCEDURE — 1280000000 HC REHAB R&B

## 2020-05-25 PROCEDURE — 94150 VITAL CAPACITY TEST: CPT

## 2020-05-25 PROCEDURE — 80048 BASIC METABOLIC PNL TOTAL CA: CPT

## 2020-05-25 PROCEDURE — 99232 SBSQ HOSP IP/OBS MODERATE 35: CPT | Performed by: PHYSICAL MEDICINE & REHABILITATION

## 2020-05-25 PROCEDURE — 94761 N-INVAS EAR/PLS OXIMETRY MLT: CPT

## 2020-05-25 PROCEDURE — 6370000000 HC RX 637 (ALT 250 FOR IP): Performed by: HOSPITALIST

## 2020-05-25 PROCEDURE — 6360000002 HC RX W HCPCS: Performed by: HOSPITALIST

## 2020-05-25 PROCEDURE — 1200000000 HC SEMI PRIVATE

## 2020-05-25 RX ADMIN — Medication 100 MG: at 08:32

## 2020-05-25 RX ADMIN — ENOXAPARIN SODIUM 30 MG: 30 INJECTION SUBCUTANEOUS at 08:32

## 2020-05-25 RX ADMIN — FUROSEMIDE 40 MG: 40 TABLET ORAL at 08:32

## 2020-05-25 RX ADMIN — SIMVASTATIN 20 MG: 20 TABLET, FILM COATED ORAL at 20:06

## 2020-05-25 RX ADMIN — FUROSEMIDE 40 MG: 40 TABLET ORAL at 16:59

## 2020-05-25 RX ADMIN — CLOPIDOGREL BISULFATE 75 MG: 75 TABLET ORAL at 08:32

## 2020-05-25 RX ADMIN — RANOLAZINE 1000 MG: 500 TABLET, FILM COATED, EXTENDED RELEASE ORAL at 20:06

## 2020-05-25 RX ADMIN — DOXYCYCLINE HYCLATE 100 MG: 100 TABLET, COATED ORAL at 20:06

## 2020-05-25 RX ADMIN — DOCUSATE SODIUM 100 MG: 100 CAPSULE, LIQUID FILLED ORAL at 08:32

## 2020-05-25 RX ADMIN — DOXYCYCLINE HYCLATE 100 MG: 100 TABLET, COATED ORAL at 08:31

## 2020-05-25 RX ADMIN — RANOLAZINE 1000 MG: 500 TABLET, FILM COATED, EXTENDED RELEASE ORAL at 08:31

## 2020-05-25 RX ADMIN — SERTRALINE 100 MG: 100 TABLET, FILM COATED ORAL at 08:31

## 2020-05-25 RX ADMIN — METOPROLOL SUCCINATE 25 MG: 25 TABLET, EXTENDED RELEASE ORAL at 08:32

## 2020-05-25 ASSESSMENT — PAIN SCALES - GENERAL
PAINLEVEL_OUTOF10: 0
PAINLEVEL_OUTOF10: 0

## 2020-05-25 NOTE — PROGRESS NOTES
Filomena Evans    : 1935  Acct #: [de-identified]  MRN: [id number]              PM&R Progress Note      Admitting diagnosis: Debility due to hyperkalemia     Comorbid diagnoses impacting rehabilitation: Acute kidney injury, rib fractures (left fourth-7), bronchiolitis, paroxysmal atrial fibrillation, congestive congestive heart failure, uncontrolled pain, generalized weakness    Chief complaint: Sleepy today. No chills or new cough. Prior (baseline) level of function: Independent. Current level of function:         Current  IRF-ESTELA and Goals:   Hearing, Speech, and Vision  Expression of Ideas and Wants: Without difficulty  Understanding Verbal and Non-Verbal Content: Understands  Prior Functioning: Everyday Activities  Self Care: Independent  Indoor Mobility (Ambulation):  Independent  Stairs: Independent  Functional Cognition: Independent  Prior Device Use: Walker    Eating  Assistance Needed: Independent  CARE Score: 6  Discharge Goal: Independent  Oral Hygiene  Assistance Needed: Setup or clean-up assistance  CARE Score: 5  Discharge Goal: Independent  Toileting Hygiene  Assistance Needed: Supervision or touching assistance  CARE Score: 4  Discharge Goal: Independent  Shower/Bathe Self  Assistance Needed: Supervision or touching assistance  CARE Score: 4  Discharge Goal: Independent  Upper Body Dressing  Assistance Needed: Setup or clean-up assistance  CARE Score: 5  Discharge Goal: Independent  Lower Body Dressing  Assistance Needed: Supervision or touching assistance  CARE Score: 4  Discharge Goal: Independent  Putting On/Taking Off Footwear  Assistance Needed: Setup or clean-up assistance  CARE Score: 5  Discharge Goal: Independent    Roll Left and Right  Assistance Needed: Supervision or touching assistance  Comment: rolling to L min due to rib pain, to R independent  CARE Score: 4  Discharge Goal: Independent  Sit to Lying  Assistance Needed: Supervision or touching assistance  CARE Score:

## 2020-05-26 PROCEDURE — 97110 THERAPEUTIC EXERCISES: CPT

## 2020-05-26 PROCEDURE — 97116 GAIT TRAINING THERAPY: CPT

## 2020-05-26 PROCEDURE — 1200000000 HC SEMI PRIVATE

## 2020-05-26 PROCEDURE — 94761 N-INVAS EAR/PLS OXIMETRY MLT: CPT

## 2020-05-26 PROCEDURE — 99233 SBSQ HOSP IP/OBS HIGH 50: CPT | Performed by: PHYSICAL MEDICINE & REHABILITATION

## 2020-05-26 PROCEDURE — 6370000000 HC RX 637 (ALT 250 FOR IP): Performed by: HOSPITALIST

## 2020-05-26 PROCEDURE — 6360000002 HC RX W HCPCS: Performed by: HOSPITALIST

## 2020-05-26 PROCEDURE — 1280000000 HC REHAB R&B

## 2020-05-26 PROCEDURE — 97530 THERAPEUTIC ACTIVITIES: CPT

## 2020-05-26 RX ADMIN — RANOLAZINE 1000 MG: 500 TABLET, FILM COATED, EXTENDED RELEASE ORAL at 09:19

## 2020-05-26 RX ADMIN — DOXYCYCLINE HYCLATE 100 MG: 100 TABLET, COATED ORAL at 19:44

## 2020-05-26 RX ADMIN — DOXYCYCLINE HYCLATE 100 MG: 100 TABLET, COATED ORAL at 09:19

## 2020-05-26 RX ADMIN — CLOPIDOGREL BISULFATE 75 MG: 75 TABLET ORAL at 09:19

## 2020-05-26 RX ADMIN — ENOXAPARIN SODIUM 30 MG: 30 INJECTION SUBCUTANEOUS at 09:19

## 2020-05-26 RX ADMIN — SERTRALINE 100 MG: 100 TABLET, FILM COATED ORAL at 09:19

## 2020-05-26 RX ADMIN — DOCUSATE SODIUM 100 MG: 100 CAPSULE, LIQUID FILLED ORAL at 09:19

## 2020-05-26 RX ADMIN — FUROSEMIDE 40 MG: 40 TABLET ORAL at 16:54

## 2020-05-26 RX ADMIN — Medication 100 MG: at 09:18

## 2020-05-26 RX ADMIN — RANOLAZINE 1000 MG: 500 TABLET, FILM COATED, EXTENDED RELEASE ORAL at 19:43

## 2020-05-26 RX ADMIN — SIMVASTATIN 20 MG: 20 TABLET, FILM COATED ORAL at 19:44

## 2020-05-26 ASSESSMENT — PAIN SCALES - GENERAL
PAINLEVEL_OUTOF10: 0
PAINLEVEL_OUTOF10: 0

## 2020-05-26 NOTE — PROGRESS NOTES
5: Pt will complete therex/ therax to facilitate inc strengthening, endurance, activity tolerance c emphasis on standing balance/ tolerance > 15 min for Indep ADL functioning  & mobility:        Plan of Care                                                                              Times per week: 5 days per week for a minimum of 60 minutes/day plus group as appropriate for 60 minutes.   Treatment to include      Electronically signed by   Chase Umana. nicole Teixeira  5/26/2020, 8:15 AM

## 2020-05-26 NOTE — PROGRESS NOTES
Transfers:    Sit--> Stand: Mod I  Stand --> Sit:   Mod I, however cues one instance to not park AD, attempts to sit before lining up to chair req safety cues   Assistive device required for transfer:   RW    Gait:    Distance:  385'+185'+165'/184'  Assistance:  Supervision/  Device:  RW  Gait Quality:  amb in recip pattern, no evidence of knee buckling. Cues one episode due to clipping edge of L wall with walker wheel, with pt stating \" I see it. \"  Attempted trial of amb using SBQC in PM but pt refused due to fatigue. Wheelchair Propulsion:  Distance:  164' /185'  Assistance: Mod I  Extremities Used: B LEs only  Type:    [x]  Manual        []  Electric    Stairs   # Completed:  5/ 2/ 2  Assistance: SBA/ CGA/SBA ascending, CG-SBA descending  Supportive Device:   B rails/ one rail/ one rail + SBQC    Curb   Height:  4   Assistance:  SBA, demonstrates safety with AD  Device:  RW    Uneven Surfaces:       Assistance:    Supervision, self corrects AD one episode when getting caught in dip of pavement. Device:    RW  Surfaces Completed:   []  Green mat with bean bags beneath      []  Throw rugs       []  Ramp       [x]  Outdoor pavements        []  Grass             []  Loose gravel        []  Other:         Additional Therapeutic activities/exercises completed this date:     [x]   Nu-step:  Time: 20'       Level: 1        #Steps:  1096  Good tolerance   []   Rebounder:    []  Seated     []  Standing        []   Balance training         []   Postural training    []   Supine ther ex (reps/sets):     [x]   Seated ther ex (reps/sets):   BLE therx using red Tband for LAQ, hip flexion, hip abd/add x 15   []   Standing ther ex (reps/sets):     [x]   Picking up object from floor (standing):   Mod I at [employer]                 []   Reacher used   []   Other:   []   Other:      Patient/Caregiver Education and Training:   [x]   Role of PT  []   Education about Dx  [x]   Use of call light for assist   []   Wheelchair

## 2020-05-27 PROCEDURE — 97112 NEUROMUSCULAR REEDUCATION: CPT

## 2020-05-27 PROCEDURE — 6360000002 HC RX W HCPCS: Performed by: HOSPITALIST

## 2020-05-27 PROCEDURE — 6370000000 HC RX 637 (ALT 250 FOR IP): Performed by: HOSPITALIST

## 2020-05-27 PROCEDURE — 1200000000 HC SEMI PRIVATE

## 2020-05-27 PROCEDURE — 97535 SELF CARE MNGMENT TRAINING: CPT

## 2020-05-27 PROCEDURE — 97110 THERAPEUTIC EXERCISES: CPT

## 2020-05-27 PROCEDURE — 97530 THERAPEUTIC ACTIVITIES: CPT

## 2020-05-27 PROCEDURE — 94150 VITAL CAPACITY TEST: CPT

## 2020-05-27 PROCEDURE — 1280000000 HC REHAB R&B

## 2020-05-27 PROCEDURE — 97116 GAIT TRAINING THERAPY: CPT

## 2020-05-27 PROCEDURE — 94761 N-INVAS EAR/PLS OXIMETRY MLT: CPT

## 2020-05-27 PROCEDURE — 99232 SBSQ HOSP IP/OBS MODERATE 35: CPT | Performed by: PHYSICAL MEDICINE & REHABILITATION

## 2020-05-27 RX ADMIN — SERTRALINE 100 MG: 100 TABLET, FILM COATED ORAL at 08:42

## 2020-05-27 RX ADMIN — METOPROLOL SUCCINATE 25 MG: 25 TABLET, EXTENDED RELEASE ORAL at 08:43

## 2020-05-27 RX ADMIN — SIMVASTATIN 20 MG: 20 TABLET, FILM COATED ORAL at 20:54

## 2020-05-27 RX ADMIN — DOXYCYCLINE HYCLATE 100 MG: 100 TABLET, COATED ORAL at 20:54

## 2020-05-27 RX ADMIN — FUROSEMIDE 40 MG: 40 TABLET ORAL at 16:18

## 2020-05-27 RX ADMIN — ERGOCALCIFEROL 50000 UNITS: 1.25 CAPSULE ORAL at 08:43

## 2020-05-27 RX ADMIN — Medication 100 MG: at 08:43

## 2020-05-27 RX ADMIN — FUROSEMIDE 40 MG: 40 TABLET ORAL at 08:43

## 2020-05-27 RX ADMIN — ENOXAPARIN SODIUM 30 MG: 30 INJECTION SUBCUTANEOUS at 08:43

## 2020-05-27 RX ADMIN — RANOLAZINE 1000 MG: 500 TABLET, FILM COATED, EXTENDED RELEASE ORAL at 08:42

## 2020-05-27 RX ADMIN — DOXYCYCLINE HYCLATE 100 MG: 100 TABLET, COATED ORAL at 08:43

## 2020-05-27 RX ADMIN — CLOPIDOGREL BISULFATE 75 MG: 75 TABLET ORAL at 08:43

## 2020-05-27 RX ADMIN — DOCUSATE SODIUM 100 MG: 100 CAPSULE, LIQUID FILLED ORAL at 08:43

## 2020-05-27 RX ADMIN — RANOLAZINE 1000 MG: 500 TABLET, FILM COATED, EXTENDED RELEASE ORAL at 20:54

## 2020-05-27 ASSESSMENT — PAIN SCALES - GENERAL
PAINLEVEL_OUTOF10: 0

## 2020-05-27 ASSESSMENT — PAIN SCALES - WONG BAKER
WONGBAKER_NUMERICALRESPONSE: 0

## 2020-05-27 NOTE — PROGRESS NOTES
training/Advanced mobility safety and technique  []   Reinforced patient's precautions/mobility while maintaining precautions  []   Postural awareness  []   Family/caregiver training  []   Progress was updated and reviewed in Rehabtracker with patient and/or family this date. []   Other:      Treatment Plan for Next Session: n/a     Assessment: Pt has met all goals except needs supervision for gait over 200' due to fatigue causing decreased ability to self assess need for rest as quality of movement declines. Pt has had decreased pain in ribs allowing for improved bed mobility and transfers. Pt is performing mobility on room air with sats dropping to 88-89% at times and pt experiences SOB. Pt states it has been suggested to use O2 at home in the past, but that \"it didn't work for [facility]" with pt relating that he didn't want bothered with it. Pt was educated in risks of continuing to perform mobility when sats are dropping including falls, and pt states he plans to limit his walking at home and though had previously stated he wanted to get home to [facility], he now knows he needs to 'sit and watch the birds\" instead.      Treatment/Activity Tolerance:   [] Tolerated treatment with no adverse effects    [x] Patient limited by fatigue Pt with decreased gait distance in pm after 2 therapy sessions in am with only slight rest before afternoon session  [] Patient limited by pain   [] Patient limited by medical complications:    [] Adverse reaction to Tx:   [] Significant change in status    Barrier/s to progress/learning:   []   None  [x]   Cognition  []   Hearing deficit  []   Pre-morbid mental/psychological status   []   Motivation  []   Communication  []   Anxiety  []   Vision deficit  []   Attention  []   Other:      Safety:       []  bed alarm set    []  chair alarm set    []  Pt refused alarms                []  Telesitter activated      [x]  Gait belt used during tx session      []other:         Number of Minutes/Billable Intervention  Gait Training 53   Therapeutic Exercise 20   Neuro Re-Ed 12   Therapeutic Activity 35   Wheelchair Propulsion    Group    Other:    TOTAL 120         Social History  Social/Functional History  Lives With: Spouse  Type of Home: House  Home Layout: Performs ADL's on one level, Able to Live on Main level with bedroom/bathroom  Home Access: Stairs to enter with rails  Entrance Stairs - Number of Steps: 2  Entrance Stairs - Rails: Right  Bathroom Shower/Tub: Tub/Shower unit, Shower chair with back  Bathroom Toilet: Standard  Bathroom Equipment: Grab bars in shower  Home Equipment: Rolling walker, Quad cane  ADL Assistance: Independent  Homemaking Responsibilities: Yes  Meal Prep Responsibility: Secondary(wife does most)  Laundry Responsibility: No  Cleaning Responsibility: Secondary  Bill Paying/Finance Responsibility: No  Shopping Responsibility: Secondary(share with wife, rides cart)  Ambulation Assistance: Independent(Pt states he uses RW more around the house and quad cane to take in car when they go out)  Transfer Assistance: Independent  Active : Yes  Mode of Transportation: Car, SUV  Occupation: Retired  Type of occupation: retired from [address] [place] [address]: [employer], does [employer]  Additional Comments: Pt sleeps in flat bed at home. Pt states he has had 4-5 falls in the last year with this admission being the only one with injury. Pt states usually his R knee will give out due to arthritis and he was told he is not a surgical candidate    Objective                                                                                    Goals:  (Update in navigator)   :   Long term goals  Time Frame for Long term goals : 5-7 days  Long term goal 1:  pt will complete all bed mobility independently  Long term goal 2:  pt will complete sit <> stand transfers with supervision  Long term goal 3: , pt will ambulate 400 feet with mod I with cane vs 2ww and no adverse

## 2020-05-27 NOTE — PROGRESS NOTES
SHRUTI received a return call from patient's wife. She is prepared for him to return home and prefers morning discharge tomorrow.  HSRUTI Srivastava/HAZEL  5/27/2020, 9:02 AM

## 2020-05-27 NOTE — PLAN OF CARE
Problem: Falls - Risk of:  Goal: Will remain free from falls  Description: Will remain free from falls  5/26/2020 2304 by Heidi Conway LPN  Outcome: Ongoing  5/26/2020 0918 by Drake Marin LPN  Outcome: Ongoing  Goal: Absence of physical injury  Description: Absence of physical injury  5/26/2020 2304 by Heidi Conway LPN  Outcome: Ongoing  5/26/2020 0918 by Drake Marin LPN  Outcome: Ongoing     Problem: Infection:  Goal: Will remain free from infection  Description: Will remain free from infection  5/26/2020 2304 by Heidi Conway LPN  Outcome: Ongoing  5/26/2020 0918 by Drake Marin LPN  Outcome: Ongoing     Problem: Safety:  Goal: Free from accidental physical injury  Description: Free from accidental physical injury  5/26/2020 2304 by Heidi Conway LPN  Outcome: Ongoing  5/26/2020 0918 by Drake Marin LPN  Outcome: Ongoing  Goal: Free from intentional harm  Description: Free from intentional harm  5/26/2020 2304 by Heidi Conway LPN  Outcome: Ongoing  5/26/2020 0918 by Drake Marin LPN  Outcome: Ongoing     Problem: Daily Care:  Goal: Daily care needs are met  Description: Daily care needs are met  5/26/2020 2304 by Heidi Conway LPN  Outcome: Ongoing  5/26/2020 0918 by Drake Marin LPN  Outcome: Ongoing     Problem: Pain:  Goal: Patient's pain/discomfort is manageable  Description: Patient's pain/discomfort is manageable  5/26/2020 2304 by Heidi Conway LPN  Outcome: Ongoing  5/26/2020 0918 by Drake Marin LPN  Outcome: Ongoing     Problem: Skin Integrity:  Goal: Skin integrity will stabilize  Description: Skin integrity will stabilize  5/26/2020 2304 by Heidi Conway LPN  Outcome: Ongoing  5/26/2020 0918 by Drake Marin LPN  Outcome: Ongoing     Problem: Discharge Planning:  Goal: Patients continuum of care needs are met  Description: Patients continuum of care needs are met  5/26/2020 2304 by Heidi Conway LPN  Outcome: Ongoing  5/26/2020 0918 by Drake Marin

## 2020-05-27 NOTE — PROGRESS NOTES
Denis Yao    : 1935  Acct #: [de-identified]  MRN: [id number]              PM&R Progress Note      Admitting diagnosis: Debility due to hyperkalemia     Comorbid diagnoses impacting rehabilitation: Acute kidney injury, rib fractures (left fourth-7), bronchiolitis, paroxysmal atrial fibrillation, congestive congestive heart failure, uncontrolled pain, generalized weakness    Chief complaint: Fair appetite. Transient nausea this morning. No diarrhea. Prior (baseline) level of function: Independent. Current level of function:         Current  IRF-ESTELA and Goals:   Hearing, Speech, and Vision  Expression of Ideas and Wants: Without difficulty  Understanding Verbal and Non-Verbal Content: Understands  Prior Functioning: Everyday Activities  Self Care: Independent  Indoor Mobility (Ambulation):  Independent  Stairs: Independent  Functional Cognition: Independent  Prior Device Use: Walker    Eating  Assistance Needed: Independent  CARE Score: 6  Discharge Goal: Independent  Oral Hygiene  Assistance Needed: Setup or clean-up assistance  CARE Score: 5  Discharge Goal: Independent  Toileting Hygiene  Assistance Needed: Supervision or touching assistance  CARE Score: 4  Discharge Goal: Independent  Shower/Bathe Self  Assistance Needed: Supervision or touching assistance  CARE Score: 4  Discharge Goal: Independent  Upper Body Dressing  Assistance Needed: Setup or clean-up assistance  CARE Score: 5  Discharge Goal: Independent  Lower Body Dressing  Assistance Needed: Supervision or touching assistance  CARE Score: 4  Discharge Goal: Independent  Putting On/Taking Off Footwear  Assistance Needed: Setup or clean-up assistance  CARE Score: 5  Discharge Goal: Independent    Roll Left and Right  Assistance Needed: Supervision or touching assistance  Comment: rolling to L min due to rib pain, to R independent  CARE Score: 4  Discharge Goal: Independent  Sit to Lying  Assistance Needed: Supervision or touching assistance  CARE Score: 4  Discharge Goal: Independent  Sit to Stand  Assistance Needed: Supervision or touching assistance  CARE Score: 4  Discharge Goal: Independent  Chair/Bed-to-Chair Transfer  Assistance Needed: Independent  CARE Score: 6  Discharge Goal: Independent  Toilet Transfer  Assistance Needed: Supervision or touching assistance  CARE Score: 4  Discharge Goal: Independent  Car Transfer  Assistance Needed: Supervision or touching assistance  CARE Score: 4  Discharge Goal: Independent   Walk 10 Feet? Walk 10 Feet?: Yes  1 Step  1 Step?: Yes  Picking Up Object  Assistance Needed: Supervision or touching assistance  Physical Assistance Level: No physical assistance  CARE Score: 4  Discharge Goal: Independent  Wheelchair Ability  Uses a Wheelchair and/or Scooter?: No                  I      Exam:    Blood pressure 138/65, pulse 78, temperature 97.7 °F (36.5 °C), temperature source Oral, resp. rate 16, height 5' 6\" (1.676 m), weight 183 lb 4 oz (83.1 kg), SpO2 97 %. General: Lying across his bed. Easily awakened from a nap. Oriented x2. HEENT: MMM. Speech clear. Neck supple. Pulmonary: Tenderness to palpation along the left mid axillary line. No rales or rhonchi. No crepitus. Cardiac: Occasional premature beat. Rate controlled. Abdomen: Patient's abdomen is soft and nondistended. Bowel sounds were present throughout. There was no rebound, guarding or masses noted. Upper extremities: Cautiously raises both hands overhead. Strong . Lower extremities: Trace edema. Heels clear. No signs of DVT. 4/5 strength throughout. Fair-coordination. Sitting balance was good.   Standing balance was fair-.    Lab Results   Component Value Date    WBC 14.7 (H) 05/21/2020    HGB 10.5 (L) 05/21/2020    HCT 32.0 (L) 05/21/2020    .3 (H) 05/21/2020     05/21/2020     Lab Results   Component Value Date    INR 1.08 08/14/2018    INR 1.30 10/27/2016    INR 1.93 06/20/2014 PROTIME 12.3 08/14/2018    PROTIME 15.3 (H) 10/27/2016    PROTIME 21.0 (H) 06/20/2014     Lab Results   Component Value Date    CREATININE 1.9 (H) 05/25/2020    BUN 52 (H) 05/25/2020     05/25/2020    K 3.9 05/25/2020    CL 97 (L) 05/25/2020    CO2 30 05/25/2020     Lab Results   Component Value Date    ALT 12 09/18/2019    AST 18 09/18/2019    ALKPHOS 55 09/18/2019    BILITOT 0.4 09/18/2019       Expected length of stay  prior to a supervised level of function for discharge home with a walker and [address] OT/PT is 5/28/2020. Recommendations:    1. Hyperkalemia with gait disturbance:   Although his insight is suboptimal, his physical endurance is improving and his standing balance is better in the daily occupational physical therapy.    Potassium remains in a normal range.   Ongoing emphasis of consistent oral hydration and nutrition, regulation of his kidney injury and DVT prophylaxis.     He has benefited from pulmonary hygiene, cautious pain management and monitoring of his O2 saturations.  Front wheeled walker ambulation is recommended after discharge. 2. DVT prophylaxis: Lovenox 30 mg subcu daily.  I must monitor his hemoglobin and platelet count periodically while on this medication.  Weightbearing activities are increasing daily.  GI prophylaxis offered.  No  nines of acute blood loss. 3. Rib fractures: Aggressive pulmonary hygiene.  O2 supplementation to keep saturations greater than 90%.  Adequate hydration.  Doxycycline and PRN duo nebs.  Holding off with Mucinex. 4. Uncontrolled pain: Patient is on an SSRI. Tolerating the acetaminophen and scheduled narcotics as needed.  Oral bowel medications. 5. A. fib with combined CHF: Daily weights, Toprol for rate control and diuresis.  No added salt diet.          Counseling and Coordination of Care: In care conference today I met with the patient's OT, PT, RN and . We discussed the patient's problems, progress and prognosis.

## 2020-05-28 VITALS
BODY MASS INDEX: 27.8 KG/M2 | WEIGHT: 173 LBS | HEIGHT: 66 IN | HEART RATE: 62 BPM | OXYGEN SATURATION: 95 % | RESPIRATION RATE: 16 BRPM | TEMPERATURE: 97.6 F | DIASTOLIC BLOOD PRESSURE: 59 MMHG | SYSTOLIC BLOOD PRESSURE: 145 MMHG

## 2020-05-28 PROCEDURE — 99239 HOSP IP/OBS DSCHRG MGMT >30: CPT | Performed by: PHYSICAL MEDICINE & REHABILITATION

## 2020-05-28 PROCEDURE — 94761 N-INVAS EAR/PLS OXIMETRY MLT: CPT

## 2020-05-28 PROCEDURE — 6360000002 HC RX W HCPCS: Performed by: HOSPITALIST

## 2020-05-28 PROCEDURE — 94150 VITAL CAPACITY TEST: CPT

## 2020-05-28 PROCEDURE — 6370000000 HC RX 637 (ALT 250 FOR IP): Performed by: HOSPITALIST

## 2020-05-28 RX ORDER — FUROSEMIDE 40 MG/1
40 TABLET ORAL 2 TIMES DAILY
Qty: 60 TABLET | Refills: 0 | Status: SHIPPED | OUTPATIENT
Start: 2020-05-28 | End: 2021-03-24

## 2020-05-28 RX ORDER — PSEUDOEPHEDRINE HCL 30 MG
100 TABLET ORAL DAILY
COMMUNITY
Start: 2020-05-29

## 2020-05-28 RX ADMIN — SERTRALINE 100 MG: 100 TABLET, FILM COATED ORAL at 08:36

## 2020-05-28 RX ADMIN — CLOPIDOGREL BISULFATE 75 MG: 75 TABLET ORAL at 08:35

## 2020-05-28 RX ADMIN — RANOLAZINE 1000 MG: 500 TABLET, FILM COATED, EXTENDED RELEASE ORAL at 08:35

## 2020-05-28 RX ADMIN — DOCUSATE SODIUM 100 MG: 100 CAPSULE, LIQUID FILLED ORAL at 08:37

## 2020-05-28 RX ADMIN — Medication 100 MG: at 08:37

## 2020-05-28 RX ADMIN — METOPROLOL SUCCINATE 25 MG: 25 TABLET, EXTENDED RELEASE ORAL at 08:35

## 2020-05-28 RX ADMIN — ENOXAPARIN SODIUM 30 MG: 30 INJECTION SUBCUTANEOUS at 08:37

## 2020-05-28 RX ADMIN — DOXYCYCLINE HYCLATE 100 MG: 100 TABLET, COATED ORAL at 08:36

## 2020-05-28 RX ADMIN — FUROSEMIDE 40 MG: 40 TABLET ORAL at 08:35

## 2020-05-28 ASSESSMENT — PAIN SCALES - WONG BAKER
WONGBAKER_NUMERICALRESPONSE: 0

## 2020-05-28 ASSESSMENT — PAIN SCALES - GENERAL: PAINLEVEL_OUTOF10: 0

## 2020-05-28 NOTE — PLAN OF CARE
Problem: Falls - Risk of:  Goal: Will remain free from falls  Description: Will remain free from falls  5/28/2020 0945 by Bonne Sandifer, RN  Outcome: Completed  5/28/2020 0148 by Feliberto Shelton RN  Outcome: Ongoing  Goal: Absence of physical injury  Description: Absence of physical injury  5/28/2020 0945 by Bonne Sandifer, RN  Outcome: Completed  5/28/2020 0148 by Feliberto Shelton RN  Outcome: Ongoing     Problem: Infection:  Goal: Will remain free from infection  Description: Will remain free from infection  5/28/2020 0945 by Bonne Sandifer, RN  Outcome: Completed  5/28/2020 0148 by Feliberto Shelton RN  Outcome: Ongoing     Problem: Safety:  Goal: Free from accidental physical injury  Description: Free from accidental physical injury  5/28/2020 0945 by Bonne Sandifer, RN  Outcome: Completed  5/28/2020 0148 by Feliberto Shelton RN  Outcome: Ongoing  Goal: Free from intentional harm  Description: Free from intentional harm  5/28/2020 0945 by Bonne Sandifer, RN  Outcome: Completed  5/28/2020 0148 by Feliberto Shelton RN  Outcome: Ongoing     Problem: Daily Care:  Goal: Daily care needs are met  Description: Daily care needs are met  5/28/2020 0945 by Bonne Sandifer, RN  Outcome: Completed  5/28/2020 0148 by Felbierto Shelton RN  Outcome: Ongoing     Problem: Pain:  Goal: Patient's pain/discomfort is manageable  Description: Patient's pain/discomfort is manageable  5/28/2020 0945 by Bonne Sandifer, RN  Outcome: Completed  5/28/2020 0148 by Feliberto Shelton RN  Outcome: Ongoing     Problem: Skin Integrity:  Goal: Skin integrity will stabilize  Description: Skin integrity will stabilize  5/28/2020 0945 by Bonne Sandifer, RN  Outcome: Completed  5/28/2020 0148 by Feliberto Shelton RN  Outcome: Ongoing     Problem: Discharge Planning:  Goal: Patients continuum of care needs are met  Description: Patients continuum of care needs are met  5/28/2020 0945 by Bonne Sandifer, RN  Outcome: Completed  5/28/2020 0148 by Feliberto Shelton

## 2020-05-28 NOTE — PROGRESS NOTES
Cruz Layne    : 1935  Acct #: [de-identified]  MRN: [id number]              PM&R Progress Note      Admitting diagnosis: Debility due to hyperkalemia     Comorbid diagnoses impacting rehabilitation: Acute kidney injury, rib fractures (left fourth-7), bronchiolitis, paroxysmal atrial fibrillation, congestive congestive heart failure, uncontrolled pain, generalized weakness     Chief complaint: Anxious to get home tomorrow. Asked questions about his medications today. Minor left thorax pain. Prior (baseline) level of function: Independent. Current level of function:         Current  IRF-ESTELA and Goals:   Hearing, Speech, and Vision  Expression of Ideas and Wants: Without difficulty  Understanding Verbal and Non-Verbal Content: Understands  Prior Functioning: Everyday Activities  Self Care: Independent  Indoor Mobility (Ambulation):  Independent  Stairs: Independent  Functional Cognition: Independent  Prior Device Use: Walker    Eating  Assistance Needed: Independent  Comment: as per past performance  CARE Score: 6  Discharge Goal: Independent  Oral Hygiene  Assistance Needed: Independent  CARE Score: 6  Discharge Goal: Independent  Toileting Hygiene  Assistance Needed: Independent  CARE Score: 6  Discharge Goal: Independent  Shower/Bathe Self  Assistance Needed: Independent  CARE Score: 6  Discharge Goal: Independent  Upper Body Dressing  Assistance Needed: Independent  CARE Score: 6  Discharge Goal: Independent  Lower Body Dressing  Assistance Needed: Independent  CARE Score: 6  Discharge Goal: Independent  Putting On/Taking Off Footwear  Assistance Needed: Independent  CARE Score: 6  Discharge Goal: Independent    Roll Left and Right  Assistance Needed: Independent  Comment: 1/10 rib pain  CARE Score: 6  Discharge Goal: Independent  Sit to Lying  Assistance Needed: Independent  CARE Score: 6  Discharge Goal: Independent  Sit to Stand  Assistance Needed: Independent  Comment: 4(deemed usual performance per team huddle)  CARE Score: 6  Discharge Goal: Independent  Chair/Bed-to-Chair Transfer  Assistance Needed: Independent  CARE Score: 6  Discharge Goal: Independent  Toilet Transfer  Assistance Needed: Independent  CARE Score: 6  Discharge Goal: Independent  Car Transfer  Assistance Needed: Supervision or touching assistance  CARE Score: 4  Discharge Goal: Independent   Walk 10 Feet? Walk 10 Feet?: Yes  1 Step  1 Step?: Yes  Picking Up Object  Assistance Needed: Independent  Physical Assistance Level: No physical assistance  CARE Score: 6  Discharge Goal: Independent  Wheelchair Ability  Uses a Wheelchair and/or Scooter?: No                  I      Exam:    Blood pressure 125/61, pulse 75, temperature 96.7 °F (35.9 °C), temperature source Oral, resp. rate 16, height 5' 6\" (1.676 m), weight 173 lb (78.5 kg), SpO2 94 %. General: Sitting up in bed. Off oxygen. In good spirits. Oriented x2. HEENT: MMM. Neck supple. Visual fields full. Pulmonary: Basilar rhonchi. No crepitus. Left chest is sore to palpation. Cardiac: Occasional premature beat. Rate controlled. Abdomen: Patient's abdomen is soft and nondistended. Bowel sounds were present throughout. There was no rebound, guarding or masses noted. Upper extremities: Slow and deliberate with his arm movements but hand coordination is fair. Lower extremities: No signs of DVT. Heels clear. Sitting balance was good.   Standing balance was fair-  Lab Results   Component Value Date    WBC 14.7 (H) 05/21/2020    HGB 10.5 (L) 05/21/2020    HCT 32.0 (L) 05/21/2020    .3 (H) 05/21/2020     05/21/2020     Lab Results   Component Value Date    INR 1.08 08/14/2018    INR 1.30 10/27/2016    INR 1.93 06/20/2014    PROTIME 12.3 08/14/2018    PROTIME 15.3 (H) 10/27/2016    PROTIME 21.0 (H) 06/20/2014     Lab Results   Component Value Date    CREATININE 1.9 (H) 05/25/2020    BUN 52 (H) 05/25/2020     05/25/2020    K 3.9 05/25/2020

## 2020-06-08 RX ORDER — EZETIMIBE AND SIMVASTATIN 10; 20 MG/1; MG/1
TABLET ORAL
Qty: 90 TABLET | Refills: 0 | Status: SHIPPED | OUTPATIENT
Start: 2020-06-08 | End: 2020-09-11

## 2020-06-10 ENCOUNTER — VIRTUAL VISIT (OUTPATIENT)
Dept: CARDIOLOGY CLINIC | Age: 85
End: 2020-06-10

## 2020-06-10 NOTE — PROGRESS NOTES
yrs, case explained and discussed with patient  Continue plavix, vytorin, ranexa. lisinoril and lopressor  3. Shortness of breath:NORMAL PFT, echo ordered  4. Sleep apnea: recommend to start CPAP, patient did not start the CPAP  5. Paroxysmal afib:not a candidate for anticoagulation for GI bleeding and falls  6. Dyslipidemia: continue statins  1. HTN: stable, continue lopressor and lisinopril medicatonsHealth maintenance: exerise and diet  All labs, medications and tests reviewed, continue all other medications of all above medical condition listed as is. Denis Yao is a 80 y.o. male being evaluated by a Virtual Visit (video visit) encounter to address concerns as mentioned above. A caregiver was present when appropriate. Due to this being a TeleHealth encounter (During [facility] public health emergency), evaluation of the following organ systems was limited: Vitals/Constitutional/EENT/Resp/CV/GI//MS/Neuro/Skin/Heme-Lymph-Imm. Pursuant to the emergency declaration under the [address] [place] [address] and the [employer] Act, this Virtual Visit was conducted with patient's (and/or legal guardian's) consent, to reduce the patient's risk of exposure to COVID-19 and provide necessary medical care. The patient (and/or legal guardian) has also been advised to contact this office for worsening conditions or problems, and seek emergency medical treatment and/or call 911 if deemed necessary. Services were provided through a video synchronous discussion virtually to substitute for in-person clinic visit. Patient and provider were located at their individual homes. 1.   I confirm that this visit was completed in a telehealth setting ,using synchronous audiovisual technology for real time patient interaction . The patient identity with name and date of birth was confirmed .  This evaluation of patient was done by telehealth in the setting of [facility] emergency , which precluded assurance of safe in person visit at the time of service. The patient consented to and accepts potential risks associated with telemedical evaluation and care was taken to assess lizzy presence of any medical issues that would be more  appropriate for expedited in -person care. Pursuant to the emergency declaration under the [address] [place] [address] waiver authority and the [employer] and [employer] Act, this Virtual  Visit was conducted, with patient's consent, to reduce the patient's risk of exposure to COVID-19 and provide continuity of care for an established patient. Services were provided through a video synchronous discussion virtually to substitute for in-person clinic visit. 2. I Affirm this is a Patient Initiated Episode with an Established Patient who has not had a related appointment within my department in the past 7 days or scheduled within the next 24 hours. --Soheila Riggins MD on 6/10/2020 at 11:58 AM    An electronic signature was used to authenticate this note.

## 2020-06-11 ENCOUNTER — OFFICE VISIT (OUTPATIENT)
Dept: FAMILY MEDICINE CLINIC | Age: 85
End: 2020-06-11
Payer: MEDICARE

## 2020-06-11 VITALS
SYSTOLIC BLOOD PRESSURE: 130 MMHG | TEMPERATURE: 98.1 F | DIASTOLIC BLOOD PRESSURE: 70 MMHG | WEIGHT: 173 LBS | OXYGEN SATURATION: 96 % | BODY MASS INDEX: 27.92 KG/M2 | RESPIRATION RATE: 20 BRPM | HEART RATE: 72 BPM

## 2020-06-11 DIAGNOSIS — E87.5 HYPERKALEMIA: ICD-10-CM

## 2020-06-11 DIAGNOSIS — R04.0 EPISTAXIS: ICD-10-CM

## 2020-06-11 PROBLEM — Z91.81 AT HIGH RISK FOR FALLS: Status: ACTIVE | Noted: 2020-06-11

## 2020-06-11 PROBLEM — S22.42XD CLOSED FRACTURE OF MULTIPLE RIBS OF LEFT SIDE WITH ROUTINE HEALING: Status: ACTIVE | Noted: 2020-05-18

## 2020-06-11 PROCEDURE — 99214 OFFICE O/P EST MOD 30 MIN: CPT | Performed by: PHYSICIAN ASSISTANT

## 2020-06-11 PROCEDURE — 1111F DSCHRG MED/CURRENT MED MERGE: CPT | Performed by: PHYSICIAN ASSISTANT

## 2020-06-11 RX ORDER — TRAMADOL HYDROCHLORIDE 50 MG/1
50 TABLET ORAL EVERY 6 HOURS PRN
Qty: 10 TABLET | Refills: 0 | Status: SHIPPED | OUTPATIENT
Start: 2020-06-11 | End: 2020-06-16

## 2020-06-11 ASSESSMENT — ENCOUNTER SYMPTOMS
CHEST TIGHTNESS: 0
WHEEZING: 0
SHORTNESS OF BREATH: 1
ABDOMINAL PAIN: 0
RHINORRHEA: 1
BACK PAIN: 1
COUGH: 0

## 2020-06-11 ASSESSMENT — PATIENT HEALTH QUESTIONNAIRE - PHQ9
SUM OF ALL RESPONSES TO PHQ QUESTIONS 1-9: 0
2. FEELING DOWN, DEPRESSED OR HOPELESS: 0
SUM OF ALL RESPONSES TO PHQ QUESTIONS 1-9: 0
1. LITTLE INTEREST OR PLEASURE IN DOING THINGS: 0
SUM OF ALL RESPONSES TO PHQ9 QUESTIONS 1 & 2: 0

## 2020-06-11 NOTE — PROGRESS NOTES
On the basis of positive falls risk screening, assessment and plan is as follows: home safety tips provided. Post-Discharge Transitional Care Management Services or Hospital Follow Up      Jenelle Strickland   YOB: 1935    Date of Office Visit:  6/11/2020  Date of Hospital Admission: 5/21/20  Date of Hospital Discharge: 5/28/20  Readmission Risk Score(high >=14%.  Medium >=10%):Readmission Risk Score: 15      Care management risk score Rising risk (score 2-5) and Complex Care (Scores >=6): 3     Non face to face  following discharge, date last encounter closed (first attempt may have been earlier): *No documented post hospital discharge outreach found in the last 14 days *No documented post hospital discharge outreach found in the last 14 days    Call initiated 2 business days of discharge: *No response recorded in the last 14 days     Patient Active Problem List   Diagnosis    Chronic low back pain    CAD (coronary artery disease)    Hyperlipidemia    Paroxysmal atrial fibrillation ([address]    Systolic dysfunction, left ventricle    Diastolic dysfunction, left ventricle    Aortic regurgitation    History of atrial fibrillation    Chronic combined systolic and diastolic congestive heart failure ([address]    Encounter for medication review    Cataract, left eye    Osteoarthritis of both knees    CKD (chronic kidney disease), stage III ([address]    Headache    Angina effort    Abnormal stress test    Essential hypertension    Tinnitus    Acute kidney injury superimposed on chronic kidney disease ([address]    Chronic kidney disease (CKD) stage G3a/A1, moderately decreased glomerular filtration rate (GFR) between 45-59 mL/min/1.73 square meter and albuminuria creatinine ratio less than 30 mg/g (HCC)    Chronic anticoagulation    History of peptic ulcer    Essential tremor    Anemia    Bronchiolitis    Gait disorder    Dyspnea    Excessive daytime sleepiness    ALMA (obstructive sleep apnea) 6/5/2020. Inpatient course: Discharge summary reviewed- see chart. Interval history/Current status: pt states he feels better overall, still having pain with left rib fractures. Using cane with ambulation and is \"taking it easy\". Pt has had \"a few nosebleeds\" since discharge-well controlled with pressure within a few minutes. Has had f/u VV with cardiology yesterday and is to resume lisinopril. Current Outpatient Medications on File Prior to Visit   Medication Sig Dispense Refill    ezetimibe-simvastatin (VYTORIN) 10-20 MG per tablet TAKE 1 TABLET NIGHTLY 90 tablet 0    furosemide (LASIX) 40 MG tablet Take 1 tablet by mouth 2 times daily 60 tablet 0    docusate sodium (COLACE, DULCOLAX) 100 MG CAPS Take 100 mg by mouth daily      albuterol sulfate  (90 Base) MCG/ACT inhaler Inhale 2 puffs into the lungs 4 times daily as needed for Wheezing 1 Inhaler 5    clopidogrel (PLAVIX) 75 MG tablet TAKE 1 TABLET DAILY 30 tablet 5    sertraline (ZOLOFT) 100 MG tablet TAKE 1 TABLET DAILY 90 tablet 3    metoprolol tartrate (LOPRESSOR) 25 MG tablet Take 0.5 tablets by mouth 2 times daily 180 tablet 3    ranolazine (RANEXA) 1000 MG extended release tablet Take 1 tablet by mouth 2 times daily 180 tablet 3    acetaminophen (TYLENOL) 500 MG tablet Take 1,000 mg by mouth every 6 hours as needed for Pain      nitroGLYCERIN (NITROSTAT) 0.4 MG SL tablet PLACE 1 TABLET UNDER THE TONGUE EVERY 5 MINUTES AS NEEDED FOR CHEST PAIN 25 tablet 2    vitamin B-1 (THIAMINE) 100 MG tablet Take 1 tablet by mouth daily 90 tablet 3     No current facility-administered medications on file prior to visit. Review of PMH, PSH, Family Hx, Allergies and updates made as needed.     PHQ Scores 6/11/2020 1/7/2020 11/19/2019 3/14/2019 11/14/2018 10/10/2018 5/29/2018   PHQ2 Score 0 0 1 0 1 0 0   PHQ9 Score 0 0 1 0 1 0 0     Interpretation of Total Score Depression Severity: 1-4 = Minimal depression, 5-9 = Mild depression, 10-14 = breath sounds present. Abdominal:      General: Bowel sounds are normal.      Palpations: Abdomen is soft. Musculoskeletal:      Right lower leg: No edema. Left lower leg: No edema. Skin:     General: Skin is warm and dry. Neurological:      Mental Status: He is alert and oriented to person, place, and time. Psychiatric:         Mood and Affect: Mood normal.             Assessment/Plan:  1. Hyperkalemia    - [place] DISCHARGE MEDS RECONCILED W/ CURRENT OUTPATIENT MED LIST  - CBC Auto Differential; Future  - Comprehensive Metabolic Panel; Future    2. At high risk for falls  -home safety tips provided, continue to use cane/walker with ambulation    3. Closed fracture of multiple ribs of left side with routine healing    - traMADol (ULTRAM) 50 MG tablet; Take 1 tablet by mouth every 6 hours as needed for Pain for up to 5 days. Dispense: 10 tablet;  Refill: 0 Risks/benefits/SE reviewed, pt voices understanding      4. Epistaxis  -nasal saline as needed  - CBC Auto Differential; Future        Medical Decision Making: moderate complexity

## 2020-06-12 LAB
A/G RATIO: 1.1 (ref 1.1–2.2)
ALBUMIN SERPL-MCNC: 3.7 G/DL (ref 3.4–5)
ALP BLD-CCNC: 95 U/L (ref 40–129)
ALT SERPL-CCNC: 12 U/L (ref 10–40)
ANION GAP SERPL CALCULATED.3IONS-SCNC: 13 MMOL/L (ref 3–16)
AST SERPL-CCNC: 24 U/L (ref 15–37)
BASOPHILS ABSOLUTE: 0.1 K/UL (ref 0–0.2)
BASOPHILS RELATIVE PERCENT: 0.7 %
BILIRUB SERPL-MCNC: 0.4 MG/DL (ref 0–1)
BUN BLDV-MCNC: 33 MG/DL (ref 7–20)
CALCIUM SERPL-MCNC: 8.7 MG/DL (ref 8.3–10.6)
CHLORIDE BLD-SCNC: 100 MMOL/L (ref 99–110)
CO2: 25 MMOL/L (ref 21–32)
CREAT SERPL-MCNC: 1.5 MG/DL (ref 0.8–1.3)
EOSINOPHILS ABSOLUTE: 0.1 K/UL (ref 0–0.6)
EOSINOPHILS RELATIVE PERCENT: 1.2 %
GFR AFRICAN AMERICAN: 54
GFR NON-AFRICAN AMERICAN: 45
GLOBULIN: 3.3 G/DL
GLUCOSE BLD-MCNC: 88 MG/DL (ref 70–99)
HCT VFR BLD CALC: 33.6 % (ref 40.5–52.5)
HEMOGLOBIN: 11.2 G/DL (ref 13.5–17.5)
LYMPHOCYTES ABSOLUTE: 1.8 K/UL (ref 1–5.1)
LYMPHOCYTES RELATIVE PERCENT: 17 %
MCH RBC QN AUTO: 35 PG (ref 26–34)
MCHC RBC AUTO-ENTMCNC: 33.3 G/DL (ref 31–36)
MCV RBC AUTO: 105.1 FL (ref 80–100)
MONOCYTES ABSOLUTE: 1.1 K/UL (ref 0–1.3)
MONOCYTES RELATIVE PERCENT: 10.5 %
NEUTROPHILS ABSOLUTE: 7.6 K/UL (ref 1.7–7.7)
NEUTROPHILS RELATIVE PERCENT: 70.6 %
PDW BLD-RTO: 15.2 % (ref 12.4–15.4)
PLATELET # BLD: 199 K/UL (ref 135–450)
PMV BLD AUTO: 7.3 FL (ref 5–10.5)
POTASSIUM SERPL-SCNC: 4.3 MMOL/L (ref 3.5–5.1)
RBC # BLD: 3.19 M/UL (ref 4.2–5.9)
SODIUM BLD-SCNC: 138 MMOL/L (ref 136–145)
TOTAL PROTEIN: 7 G/DL (ref 6.4–8.2)
WBC # BLD: 10.7 K/UL (ref 4–11)

## 2020-06-23 NOTE — DISCHARGE SUMMARY
Patient Name: Georgianne Libman  Patient :  1935  Patient MRN:   [id number]      Admission Date:  2020  Discharge Date: 2020. Admitting diagnosis: Debility due to hyperkalemia     Comorbid diagnoses impacting rehabilitation: Acute kidney injury, rib fractures (left fourth-7), bronchiolitis, paroxysmal atrial fibrillation, congestive congestive heart failure, uncontrolled pain, generalized weakness    Discharging diagnosis: Debility due to hyperkalemia     Comorbid diagnoses impacting rehabilitation: Acute kidney injury, rib fractures (left fourth-7), bronchiolitis, paroxysmal atrial fibrillation, congestive congestive heart failure, uncontrolled pain, generalized weakness     History of present illness: Patient is an 78-year-old right-hand-dominant male who was in his usual state of fair health until 5/15/2020. That day he stumbled walking on some steps and landed awkwardly onto his trunk/chest.  He had immediate pain in his ribs but did not seek medical attention. After 3 days he could no longer get his breath or care for himself so he presented to our ED on 2020. He was found to have multiple fractures in the left chest (4-7) as well as significant hyperkalemia. He was treated for acute kidney injury with fluid resuscitation and medication adjustments. He had electrolyte disturbance, congestive heart failure and prerenal azotemia. He had been rehydrated and his chemistries were improving but he still was unable to do his own toileting, dressing or ambulation. Prior (baseline) level of function: Independent. Current level of function:         Current  IRF-ESTELA and Goals:   Hearing, Speech, and Vision  Expression of Ideas and Wants: Without difficulty  Understanding Verbal and Non-Verbal Content: Understands  Prior Functioning: Everyday Activities  Self Care: Independent  Indoor Mobility (Ambulation):  Independent  Stairs: Independent  Functional Cognition: Independent  Prior Device tolerating an oral diet without choking/coughing and was back to their baseline with regards to bowel and bladder control. Discharge instructions were reviewed with the patient and family. The patient is to follow up with the PCP in 1 week. No driving. [facility] PT/OT/RN will be arranged. David DOE   Home Medication Instructions [id number]    Printed on:06/23/20 5979   Medication Information                      acetaminophen (TYLENOL) 500 MG tablet  Take 1,000 mg by mouth every 6 hours as needed for Pain             albuterol sulfate  (90 Base) MCG/ACT inhaler  Inhale 2 puffs into the lungs 4 times daily as needed for Wheezing             clopidogrel (PLAVIX) 75 MG tablet  TAKE 1 TABLET DAILY             docusate sodium (COLACE, DULCOLAX) 100 MG CAPS  Take 100 mg by mouth daily             furosemide (LASIX) 40 MG tablet  Take 1 tablet by mouth 2 times daily             metoprolol tartrate (LOPRESSOR) 25 MG tablet  Take 0.5 tablets by mouth 2 times daily             nitroGLYCERIN (NITROSTAT) 0.4 MG SL tablet  PLACE 1 TABLET UNDER THE TONGUE EVERY 5 MINUTES AS NEEDED FOR CHEST PAIN             ranolazine (RANEXA) 1000 MG extended release tablet  Take 1 tablet by mouth 2 times daily             sertraline (ZOLOFT) 100 MG tablet  TAKE 1 TABLET DAILY             vitamin B-1 (THIAMINE) 100 MG tablet  Take 1 tablet by mouth daily                 CONDITION ON DISCHARGE: Stable. The prognosis is fair for further improvements in ADL's and safety with adapted gait/transfers. Record review, patient exam, discharge instructions, medication reconciliation and summary for this discharge visit took more than 30 minutes.

## 2020-07-07 RX ORDER — CLOPIDOGREL BISULFATE 75 MG/1
TABLET ORAL
Qty: 30 TABLET | Refills: 5 | Status: SHIPPED | OUTPATIENT
Start: 2020-07-07 | End: 2021-01-29

## 2020-07-22 ENCOUNTER — OFFICE VISIT (OUTPATIENT)
Dept: CARDIOLOGY CLINIC | Age: 85
End: 2020-07-22
Payer: MEDICARE

## 2020-07-22 VITALS
WEIGHT: 183 LBS | OXYGEN SATURATION: 98 % | HEART RATE: 71 BPM | HEIGHT: 66 IN | TEMPERATURE: 97.5 F | SYSTOLIC BLOOD PRESSURE: 124 MMHG | BODY MASS INDEX: 29.41 KG/M2 | DIASTOLIC BLOOD PRESSURE: 78 MMHG

## 2020-07-22 PROCEDURE — 4040F PNEUMOC VAC/ADMIN/RCVD: CPT | Performed by: INTERNAL MEDICINE

## 2020-07-22 PROCEDURE — 1036F TOBACCO NON-USER: CPT | Performed by: INTERNAL MEDICINE

## 2020-07-22 PROCEDURE — 99214 OFFICE O/P EST MOD 30 MIN: CPT | Performed by: INTERNAL MEDICINE

## 2020-07-22 PROCEDURE — G8427 DOCREV CUR MEDS BY ELIG CLIN: HCPCS | Performed by: INTERNAL MEDICINE

## 2020-07-22 PROCEDURE — G8417 CALC BMI ABV UP PARAM F/U: HCPCS | Performed by: INTERNAL MEDICINE

## 2020-07-22 PROCEDURE — 1123F ACP DISCUSS/DSCN MKR DOCD: CPT | Performed by: INTERNAL MEDICINE

## 2020-07-22 NOTE — PROGRESS NOTES
Mazin Edwards MD        OFFICE  FOLLOWUP NOTE    Chief complaints: patient is here for management of CAD cabg , HTN, AFIB, DYSLPIDEMIA, he had GI bleeding, sleep apnea, ckd    Subjective: patient feels better, no chest pain, no shortness of breath, no dizziness, no palpitations    EDWARD Thakur is a 80 y. o.year old who  has a past medical history of Atrial fibrillation ([facility] [address] CAD (coronary artery disease), CHF (congestive heart failure) ([facility] [address] Chronic back pain, Excessive daytime sleepiness, Fractured rib, H/O cardiovascular stress test, H/O diagnostic tests, H/O Doppler ultrasound, H/O echocardiogram, History of echocardiogram, History of nuclear stress test, Hx of Doppler echocardiogram, Hx of echocardiogram, Hyperlipidemia, Hypertension, and Obstructive sleep apnea. and presents for management of CAD cabg , HTN, AFIB, DYSLPIDEMIA, he had GI bleeding, sleep apnea which are well controlled    Patient fell down and had left sided rib fractures, was in rehab has epistaxis also, he had increase lung congestion and was started on lasix also and his lisinopril was stopped, last time his lisinopril was restarted in office. his blood pressure remains normal  Current Outpatient Medications   Medication Sig Dispense Refill    clopidogrel (PLAVIX) 75 MG tablet TAKE 1 TABLET DAILY 30 tablet 5    ezetimibe-simvastatin (VYTORIN) 10-20 MG per tablet TAKE 1 TABLET NIGHTLY 90 tablet 0    docusate sodium (COLACE, DULCOLAX) 100 MG CAPS Take 100 mg by mouth daily      albuterol sulfate  (90 Base) MCG/ACT inhaler Inhale 2 puffs into the lungs 4 times daily as needed for Wheezing 1 Inhaler 5    sertraline (ZOLOFT) 100 MG tablet TAKE 1 TABLET DAILY 90 tablet 3    metoprolol tartrate (LOPRESSOR) 25 MG tablet Take 0.5 tablets by mouth 2 times daily 180 tablet 3    ranolazine (RANEXA) 1000 MG extended release tablet Take 1 tablet by mouth 2 times daily 180 tablet 3    acetaminophen (TYLENOL) 500 MG tablet Take 1,000 mg by mouth every 6 hours as needed for Pain      nitroGLYCERIN (NITROSTAT) 0.4 MG SL tablet PLACE 1 TABLET UNDER THE TONGUE EVERY 5 MINUTES AS NEEDED FOR CHEST PAIN 25 tablet 2    vitamin B-1 (THIAMINE) 100 MG tablet Take 1 tablet by mouth daily 90 tablet 3    furosemide (LASIX) 40 MG tablet Take 1 tablet by mouth 2 times daily 60 tablet 0     No current facility-administered medications for this visit. Allergies: Patient has no known allergies. Past Medical History:   Diagnosis Date    Atrial fibrillation ([address]     CAD (coronary artery disease)     S/P CABG x 4 6/2014    CH[facility]congestive heart failure) ([facility])     Chronic back pain     Excessive daytime sleepiness 9/19/2018    Fractured rib 4th and 7th left rib    fracture 4th and 7th ribs    H/O cardiovascular stress test 5/22/2014    EF 50%. There is evidence of mild to moderate ischemia in the mid inferolateral regions.  H/O diagnostic tests 01/27/2011    Aortic Duplex scan. Normal study.  H/O Doppler ultrasound 07/19/2017    Carotid-Moderate disease of the Bilateral internal carotid arteries. Calcific plaque noted throughout.  H/O echocardiogram 7/19/17,6/3/14    EF 55-60% Mild AS Aortic valve leaflets are somewhat thickened. Mildly enlarged right atrium size and dimension.  History of echocardiogram 12/14/2016    LV function is normal. Mild aortic Stenosis noted.  History of nuclear stress test 7/18/17, 7/31/2018    Normal LV function. Normal study. EF 45%.  Hx of Doppler echocardiogram 05/08/2020    EF35-40%,moderately dilated left atrium,mild aortic stenosis,mild aortic regurg,moderate mitral and tricuspid regurg    Hx of echocardiogram 1/26/16    EF 40%, depressed LV function, Moderate LVH, moderate to severe aortic insufficiency and mild aortic stenosis.      Hyperlipidemia     Hypertension     Obstructive sleep apnea 10/29/2018     Past Surgical History:   Procedure Laterality Date    APPENDECTOMY      CHOLECYSTECTOMY      CORONARY ANGIOPLASTY WITH STENT PLACEMENT      stent to the LAD and Circ    CORONARY ARTERY BYPASS GRAFT  14    CABG x4; LIMA to LAD & diag, SVG to CX marginal 1, SVG to CX marginal 2    DIAGNOSTIC CARDIAC CATH LAB PROCEDURE  2006    Normal LM, 60-70% ostial and prox LAD, 30% prox. 1st diag, 60% prox.  circumflex (co-dominant vessel and collateralizes RCA), 80% OM2, 100% occluded RCA, very small PDA that fills by collaterals from CX, small abd aortic aneurysm, mild MR, normal aortic arch and origin of great vessels    EYE SURGERY Right     Cataract Extraction    EYE SURGERY Left 2014    Cataract Extraction    HERNIA REPAIR      Umbilical    TONSILLECTOMY Bilateral      Family History   Problem Relation Age of Onset    Heart Disease Father      Social History     Tobacco Use    Smoking status: Former Smoker     Packs/day: 1.00     Years: 25.00     Pack years: 25.00     Last attempt to quit: 1974     Years since quittin.3    Smokeless tobacco: Never Used   Substance Use Topics    Alcohol use: No      [unfilled]  Review of Systems:   · Constitutional: No Fever or Weight Loss   · Eyes: No Decreased Vision  · ENT: No Headaches, Hearing Loss or Vertigo  · Cardiovascular: No chest pain, dyspnea on exertion, palpitations or loss of consciousness  · Respiratory: No cough or wheezing    · Gastrointestinal: No abdominal pain, appetite loss, blood in stools, constipation, diarrhea or heartburn  · Genitourinary: No dysuria, trouble voiding, or hematuria  · Musculoskeletal:  No gait disturbance, weakness or joint complaints  · Integumentary: No rash or pruritis  · Neurological: No TIA or stroke symptoms  · Psychiatric: No anxiety or depression  · Endocrine: No malaise, fatigue or temperature intolerance  · Hematologic/Lymphatic: No bleeding problems, blood clots or swollen lymph nodes  · Allergic/Immunologic: No nasal congestion or hives  All systems negative except as

## 2020-09-11 ENCOUNTER — OFFICE VISIT (OUTPATIENT)
Dept: FAMILY MEDICINE CLINIC | Age: 85
End: 2020-09-11
Payer: MEDICARE

## 2020-09-11 VITALS
TEMPERATURE: 98.6 F | RESPIRATION RATE: 20 BRPM | DIASTOLIC BLOOD PRESSURE: 64 MMHG | WEIGHT: 181 LBS | BODY MASS INDEX: 29.21 KG/M2 | OXYGEN SATURATION: 95 % | HEART RATE: 71 BPM | SYSTOLIC BLOOD PRESSURE: 122 MMHG

## 2020-09-11 PROCEDURE — 4040F PNEUMOC VAC/ADMIN/RCVD: CPT | Performed by: PHYSICIAN ASSISTANT

## 2020-09-11 PROCEDURE — 99214 OFFICE O/P EST MOD 30 MIN: CPT | Performed by: PHYSICIAN ASSISTANT

## 2020-09-11 PROCEDURE — G8427 DOCREV CUR MEDS BY ELIG CLIN: HCPCS | Performed by: PHYSICIAN ASSISTANT

## 2020-09-11 PROCEDURE — 1123F ACP DISCUSS/DSCN MKR DOCD: CPT | Performed by: PHYSICIAN ASSISTANT

## 2020-09-11 PROCEDURE — G8417 CALC BMI ABV UP PARAM F/U: HCPCS | Performed by: PHYSICIAN ASSISTANT

## 2020-09-11 PROCEDURE — 1036F TOBACCO NON-USER: CPT | Performed by: PHYSICIAN ASSISTANT

## 2020-09-11 RX ORDER — ACETAMINOPHEN 500 MG
1000 TABLET ORAL 3 TIMES DAILY
Qty: 180 TABLET | Refills: 0 | Status: SHIPPED | OUTPATIENT
Start: 2020-09-11 | End: 2020-12-02

## 2020-09-11 RX ORDER — RANOLAZINE 1000 MG/1
1000 TABLET, EXTENDED RELEASE ORAL 2 TIMES DAILY
Qty: 180 TABLET | Refills: 3 | Status: SHIPPED | OUTPATIENT
Start: 2020-09-11 | End: 2021-09-11

## 2020-09-11 RX ORDER — EZETIMIBE AND SIMVASTATIN 10; 20 MG/1; MG/1
TABLET ORAL
Qty: 90 TABLET | Refills: 0 | Status: SHIPPED | OUTPATIENT
Start: 2020-09-11 | End: 2020-12-07

## 2020-09-11 RX ORDER — ALBUTEROL SULFATE 90 UG/1
2 AEROSOL, METERED RESPIRATORY (INHALATION) 4 TIMES DAILY PRN
Qty: 1 INHALER | Refills: 5 | Status: SHIPPED | OUTPATIENT
Start: 2020-09-11 | End: 2020-11-19 | Stop reason: SDUPTHER

## 2020-09-11 RX ORDER — SERTRALINE HYDROCHLORIDE 100 MG/1
TABLET, FILM COATED ORAL
Qty: 90 TABLET | Refills: 3 | Status: SHIPPED | OUTPATIENT
Start: 2020-09-11

## 2020-09-11 NOTE — PROGRESS NOTES
Elian Senior  1935  80 y.o.  male    SUBJECTIVE:    Chief Complaint   Patient presents with    3 Month Follow-Up     Pt here for 3 month follow up       HPI   Pt here for 3 month f/u chronic conditions as follows: Afib-follows with cardiology, last appt 7/22/2020, notes reviewed. CHF-combined systolic and diastolic. Pt to continue lasix and lisinopril    CKD-chronic, stable    CAD-s/p CABG, does have dyspnea on exertion which is chronic, uses albuterol inhaler prn, on plavix/ranexa. Last PFT normal, last echo EF 35-40%    HTN-The patient is taking hypertensive medications compliantly without side effects. Denies chest pain, edema, or TIA's. Chronic back/knee pain-using tylenol 1-2 times a day, having diff controlling pain. Unable to take nsaids due to CKD and anticoagulation. Current Outpatient Medications on File Prior to Visit   Medication Sig Dispense Refill    clopidogrel (PLAVIX) 75 MG tablet TAKE 1 TABLET DAILY 30 tablet 5    furosemide (LASIX) 40 MG tablet Take 1 tablet by mouth 2 times daily 60 tablet 0    docusate sodium (COLACE, DULCOLAX) 100 MG CAPS Take 100 mg by mouth daily      acetaminophen (TYLENOL) 500 MG tablet Take 1,000 mg by mouth every 6 hours as needed for Pain      nitroGLYCERIN (NITROSTAT) 0.4 MG SL tablet PLACE 1 TABLET UNDER THE TONGUE EVERY 5 MINUTES AS NEEDED FOR CHEST PAIN 25 tablet 2    vitamin B-1 (THIAMINE) 100 MG tablet Take 1 tablet by mouth daily 90 tablet 3     No current facility-administered medications on file prior to visit. Review of PMH, PSH, Family Hx, Allergies and updates made as needed.     PHQ Scores 6/11/2020 1/7/2020 11/19/2019 3/14/2019 11/14/2018 10/10/2018 5/29/2018   PHQ2 Score 0 0 1 0 1 0 0   PHQ9 Score 0 0 1 0 1 0 0     Interpretation of Total Score Depression Severity: 1-4 = Minimal depression, 5-9 = Mild depression, 10-14 = Moderate depression, 15-19 = Moderately severe depression, 20-27 = Severe depression      No Known Allergies    Past Medical History:   Diagnosis Date    Atrial fibrillation ([facility] [address]     CAD (coronary artery disease)     S/P CABG x 4 6/2014    [facility]congest[facility]) ([facility])     Chronic back pain     Excessive daytime sleepiness 9/19/2018    Fractured rib 4th and 7th left rib    fracture 4th and 7th ribs    H/O cardiovascular stress test 5/22/2014    EF 50%. There is evidence of mild to moderate ischemia in the mid inferolateral regions.  H/O diagnostic tests 01/27/2011    Aortic Duplex scan. Normal study.  H/O Doppler ultrasound 07/19/2017    Carotid-Moderate disease of the Bilateral internal carotid arteries. Calcific plaque noted throughout.  H/O echocardiogram 7/19/17,6/3/14    EF 55-60% Mild AS Aortic valve leaflets are somewhat thickened. Mildly enlarged right atrium size and dimension.  History of echocardiogram 12/14/2016    LV function is normal. Mild aortic Stenosis noted.  History of nuclear stress test 7/18/17, 7/31/2018    Normal LV function. Normal study. EF 45%.  Hx of Doppler echocardiogram 05/08/2020    EF35-40%,moderately dilated left atrium,mild aortic stenosis,mild aortic regurg,moderate mitral and tricuspid regurg    Hx of echocardiogram 1/26/16    EF 40%, depressed LV function, Moderate LVH, moderate to severe aortic insufficiency and mild aortic stenosis.  Hyperlipidemia     Hypertension     Obstructive sleep apnea 10/29/2018       Past Surgical History:   Procedure Laterality Date    APPENDECTOMY      CHOLECYSTECTOMY      CORONARY ANGIOPLASTY WITH STENT PLACEMENT  2006    stent to the LAD and Circ    CORONARY ARTERY BYPASS GRAFT  6/4/14    CABG x4; LIMA to LAD & diag, SVG to CX marginal 1, SVG to CX marginal 2    DIAGNOSTIC CARDIAC CATH LAB PROCEDURE  03/07/2006    Normal LM, 60-70% ostial and prox LAD, 30% prox. 1st diag, 60% prox.  circumflex (co-dominant vessel and collateralizes RCA), 80% OM2, 100% occluded RCA, very small PDA that fills by collaterals from CX, small abd aortic aneurysm, mild MR, normal aortic arch and origin of great vessels    EYE SURGERY Right     Cataract Extraction    EYE SURGERY Left 2014    Cataract Extraction    HERNIA REPAIR      Umbilical    TONSILLECTOMY Bilateral        Social History     Socioeconomic History    Marital status:      Spouse name: None    Number of children: None    Years of education: None    Highest education level: None   Occupational History    None   Social Needs    Financial resource strain: None    Food insecurity     Worry: None     Inability: None    Transportation needs     Medical: None     Non-medical: None   Tobacco Use    Smoking status: Former Smoker     Packs/day: 1.00     Years: 25.00     Pack years: 25.00     Last attempt to quit: 1974     Years since quittin.4    Smokeless tobacco: Never Used   Substance and Sexual Activity    Alcohol use: No    Drug use: No    Sexual activity: Yes     Partners: Female     Comment:    Lifestyle    Physical activity     Days per week: None     Minutes per session: None    Stress: None   Relationships    Social connections     Talks on phone: None     Gets together: None     Attends Hinduism service: None     Active member of club or organization: None     Attends meetings of clubs or organizations: None     Relationship status: None    Intimate partner violence     Fear of current or ex partner: None     Emotionally abused: None     Physically abused: None     Forced sexual activity: None   Other Topics Concern    None   Social History Narrative    None       Review of Systems   Constitutional: Negative for chills, fatigue and fever. HENT: Negative. Respiratory: Positive for shortness of breath. Negative for cough, chest tightness and wheezing. Cardiovascular: Negative for chest pain, palpitations and leg swelling. Gastrointestinal: Negative for abdominal pain. Endocrine: Negative. Musculoskeletal: Positive for arthralgias and back pain. Skin: Negative for rash. Neurological: Negative for dizziness and headaches. Psychiatric/Behavioral: Negative for dysphoric mood. The patient is not nervous/anxious. OBJECTIVE:    /64 (Site: Left Upper Arm, Position: Sitting, Cuff Size: Medium Adult)   Pulse 71   Temp 98.6 °F (37 °C) (Infrared)   Resp 20   Wt 181 lb (82.1 kg)   SpO2 95%   BMI 29.21 kg/m²     Physical Exam  Vitals signs reviewed. Constitutional:       General: He is not in acute distress. Appearance: Normal appearance. HENT:      Head: Normocephalic. Eyes:      Conjunctiva/sclera: Conjunctivae normal.   Neck:      Musculoskeletal: Normal range of motion and neck supple. Cardiovascular:      Rate and Rhythm: Normal rate and regular rhythm. Heart sounds: Normal heart sounds. Pulmonary:      Breath sounds: Normal breath sounds. Abdominal:      General: Bowel sounds are normal.      Palpations: Abdomen is soft. Musculoskeletal:         General: Tenderness present. Skin:     General: Skin is warm and dry. Neurological:      Mental Status: He is alert and oriented to person, place, and time.    Psychiatric:         Mood and Affect: Mood normal.         ASSESSMENT/PLAN:    Problem List        Circulatory    Paroxysmal atrial fibrillation (HCC) - Primary     Continue current meds, f/u with cardiology as scheduled         Chronic combined systolic and diastolic congestive heart failure ([address]     Continue current meds, f/u with cardiology as scheduled         Relevant Medications    ezetimibe-simvastatin (VYTORIN) 10-20 MG per tablet       Musculoskeletal and Integument    Osteoarthritis of both knees    Relevant Medications    acetaminophen (TYLENOL) 500 MG tablet    acetaminophen (TYLENOL) 500 MG tablet       Other    Dyspnea    Relevant Medications    albuterol sulfate  (90 Base) MCG/ACT inhaler    Chronic low back pain    Relevant Medications    acetaminophen (TYLENOL) 500 MG tablet    acetaminophen (TYLENOL) 500 MG tablet               Return in about 3 months (around 12/11/2020).

## 2020-09-14 PROBLEM — S22.32XG CLOSED FRACTURE OF ONE RIB OF LEFT SIDE WITH DELAYED HEALING: Status: RESOLVED | Noted: 2020-05-18 | Resolved: 2020-09-14

## 2020-09-14 PROBLEM — R04.0 EPISTAXIS: Status: RESOLVED | Noted: 2020-06-11 | Resolved: 2020-09-14

## 2020-09-14 PROBLEM — S22.42XD CLOSED FRACTURE OF MULTIPLE RIBS OF LEFT SIDE WITH ROUTINE HEALING: Status: RESOLVED | Noted: 2020-05-18 | Resolved: 2020-09-14

## 2020-09-14 PROBLEM — J20.9 ACUTE BRONCHITIS DUE TO INFECTION: Status: RESOLVED | Noted: 2020-01-07 | Resolved: 2020-09-14

## 2020-09-14 ASSESSMENT — ENCOUNTER SYMPTOMS
WHEEZING: 0
BACK PAIN: 1
CHEST TIGHTNESS: 0
COUGH: 0
SHORTNESS OF BREATH: 1
ABDOMINAL PAIN: 0

## 2020-10-29 RX ORDER — LISINOPRIL 5 MG/1
TABLET ORAL
Qty: 90 TABLET | Refills: 3 | Status: CANCELLED | OUTPATIENT
Start: 2020-10-29

## 2020-10-29 NOTE — TELEPHONE ENCOUNTER
This med was discontinued when he was last in hospital due to his potassium being elevated. This med can cause the potassium to go up so he should no longer be on it.

## 2020-11-14 ENCOUNTER — TELEPHONE (OUTPATIENT)
Dept: FAMILY MEDICINE CLINIC | Age: 85
End: 2020-11-14

## 2020-11-14 ENCOUNTER — HOSPITAL ENCOUNTER (EMERGENCY)
Age: 85
Discharge: HOME | End: 2020-11-14
Attending: EMERGENCY MEDICINE
Payer: MEDICARE

## 2020-11-14 VITALS
OXYGEN SATURATION: 92 % | BODY MASS INDEX: 29.21 KG/M2 | TEMPERATURE: 97.6 F | SYSTOLIC BLOOD PRESSURE: 131 MMHG | WEIGHT: 181 LBS | RESPIRATION RATE: 20 BRPM | DIASTOLIC BLOOD PRESSURE: 73 MMHG | HEART RATE: 69 BPM

## 2020-11-14 PROCEDURE — 93005 ELECTROCARDIOGRAM TRACING: CPT | Performed by: EMERGENCY MEDICINE

## 2020-11-14 PROCEDURE — U0002 COVID-19 LAB TEST NON-CDC: HCPCS

## 2020-11-14 PROCEDURE — 99284 EMERGENCY DEPT VISIT MOD MDM: CPT

## 2020-11-14 ASSESSMENT — PAIN DESCRIPTION - PAIN TYPE: TYPE: ACUTE PAIN

## 2020-11-14 ASSESSMENT — PAIN DESCRIPTION - LOCATION: LOCATION: LEG

## 2020-11-14 ASSESSMENT — PAIN DESCRIPTION - FREQUENCY: FREQUENCY: CONTINUOUS

## 2020-11-14 NOTE — ED PROVIDER NOTES
Emergency Department Encounter  Location: [address]    Patient: Kevon Alvarado  MRN: [id number]  : 1935  Date of evaluation: 2020  ED Provider: Allison Hernandez DO, FACEP    Chief Complaint:    Shortness of Breath (sent in by DR Alessandra HARRIS:  Kevon Alvarado is a 80 y.o. male that presents to the emergency department with complaints of concerns for COVID-19. The patient called his primary caregiver and spoke with Dr. Libby Bran. She was concerned that the patient may be showing signs of congestive heart failure and asked that he come to the emergency department to be evaluated upon arrival however the patient states that he is no further short of breath and is at his baseline and that he just wants \"that test\". Patient denies coughing is had no fever or chills does not have a sore throat and has not lost his taste or smell. His wife is simply concerned and thinks that he should be tested given his age and comorbidities including congestive heart failure and other heart problems. He states \"I got about hard I know that\". He is denying any chest pain denies any fever or chills. ROS - see HPI, below listed is current ROS at time of my eval:  At least 10 systems reviewed and otherwise acutely negative except as in the [address]   General:  No fevers, no chills, no weakness  Eyes:  No recent vison changes, no discharge  ENT:  No sore throat, no nasal congestion, no hearing changes  Cardiovascular:  No chest pain, no palpitations  Respiratory: Positive for shortness of breath, no cough, no wheezing  Gastrointestinal:  No pain, no nausea, no vomiting, no diarrhea  Musculoskeletal:  No muscle pain, no joint pain  Skin:  No rash, no pruritis, no easy bruising  Neurologic:  No speech problems, no headache, no extremity numbness, no extremity tingling, no extremity weakness  Psychiatric:  No anxiety  Genitourinary:  No dysuria, no hematuria  Endocrine:  No unexpected weight gain, no occluded RCA, very small PDA that fills by collaterals from CX, small abd aortic aneurysm, mild MR, normal aortic arch and origin of great vessels    EYE SURGERY Right     Cataract Extraction    EYE SURGERY Left 2014    Cataract Extraction    HERNIA REPAIR      Umbilical    TONSILLECTOMY Bilateral      Family History   Problem Relation Age of Onset    Heart Disease Father      Social History     Socioeconomic History    Marital status:      Spouse name: Not on file    Number of children: Not on file    Years of education: Not on file    Highest education level: Not on file   Occupational History    Not on file   Social Needs    Financial resource strain: Not on file    Food insecurity     Worry: Not on file     Inability: Not on file    Transportation needs     Medical: Not on file     Non-medical: Not on file   Tobacco Use    Smoking status: Former Smoker     Packs/day: 1.00     Years: 25.00     Pack years: 25.00     Last attempt to quit: 1974     Years since quittin.6    Smokeless tobacco: Never Used   Substance and Sexual Activity    Alcohol use: No    Drug use: No    Sexual activity: Yes     Partners: Female     Comment:    Lifestyle    Physical activity     Days per week: Not on file     Minutes per session: Not on file    Stress: Not on file   Relationships    Social connections     Talks on phone: Not on file     Gets together: Not on file     Attends Pentecostal service: Not on file     Active member of club or organization: Not on file     Attends meetings of clubs or organizations: Not on file     Relationship status: Not on file    Intimate partner violence     Fear of current or ex partner: Not on file     Emotionally abused: Not on file     Physically abused: Not on file     Forced sexual activity: Not on file   Other Topics Concern    Not on file   Social History Narrative    Not on file     No current facility-administered medications for this encounter. Current Outpatient Medications   Medication Sig Dispense Refill    ezetimibe-simvastatin (VYTORIN) 10-20 MG per tablet TAKE 1 TABLET NIGHTLY 90 tablet 0    albuterol sulfate  (90 Base) MCG/ACT inhaler Inhale 2 puffs into the lungs 4 times daily as needed for Wheezing 1 Inhaler 5    metoprolol tartrate (LOPRESSOR) 25 MG tablet Take 0.5 tablets by mouth 2 times daily 180 tablet 3    ranolazine (RANEXA) 1000 MG extended release tablet Take 1 tablet by mouth 2 times daily 180 tablet 3    sertraline (ZOLOFT) 100 MG tablet TAKE 1 TABLET DAILY 90 tablet 3    acetaminophen (TYLENOL) 500 MG tablet Take 2 tablets by mouth 3 times daily 180 tablet 0    clopidogrel (PLAVIX) 75 MG tablet TAKE 1 TABLET DAILY 30 tablet 5    furosemide (LASIX) 40 MG tablet Take 1 tablet by mouth 2 times daily 60 tablet 0    docusate sodium (COLACE, DULCOLAX) 100 MG CAPS Take 100 mg by mouth daily      acetaminophen (TYLENOL) 500 MG tablet Take 1,000 mg by mouth every 6 hours as needed for Pain      nitroGLYCERIN (NITROSTAT) 0.4 MG SL tablet PLACE 1 TABLET UNDER THE TONGUE EVERY 5 MINUTES AS NEEDED FOR CHEST PAIN 25 tablet 2    vitamin B-1 (THIAMINE) 100 MG tablet Take 1 tablet by mouth daily 90 tablet 3     No Known Allergies    Nursing Notes Reviewed    Physical Exam:  ED Triage Vitals [11/14/20 1311]   Enc Vitals Group      /73      Pulse 69      Resp 20      Temp 97.6 °F (36.4 °C)      Temp Source Oral      SpO2 92 %      Weight 181 lb (82.1 kg)      Height       Head Circumference       Peak Flow       Pain Score       Pain Loc       Pain Edu? Excl. in [address]? GENERAL APPEARANCE: Awake and alert. Cooperative. No acute distress. Nontoxic in appearance  HEAD: Normocephalic. Atraumatic. EYES: EOM's grossly intact. Sclera anicteric. ENT: Tolerates saliva. No trismus. NECK: Supple. Trachea midline. CARDIO: RRR. Radial pulse 2+. LUNGS: Respirations unlabored.   Decreased breath sounds bilaterally  ABDOMEN: Soft. Non-distended. Non-tender. EXTREMITIES: No acute deformities. Minimal pitting edema bilaterally  SKIN: Warm and dry. NEUROLOGICAL: No gross facial drooping. Moves all 4 extremities spontaneously. PSYCHIATRIC: Normal mood. Labs:  No results found for this visit on 11/14/20. EKG (if obtained): (All EKG's are interpreted by myself in the absence of a cardiologist)  The Ekg interpreted by me in the absence of a cardiologist shows. normal sinus rhythm with a rate of 69 with PVCs  Axis is   Left axis deviation  QTc is  516  Intervals and Durations are unremarkable. No specific ST-T wave changes appreciated. No evidence of acute ischemia. No significant change from prior EKG dated 18 May 2020      Radiographs (if obtained):  [] The following radiograph was interpreted by myself in the absence of a radiologist:  [x] Radiologist's Report reviewed at time of ED visit:  No orders to display       ED Course and MDM:  Here in the emergency department the patient is resistant to further testing. An EKG was obtained and I examined the patient. He appears to be in no acute distress at this time. Covid test is ordered and he is discharged in stable condition and is informed that the test will be available in 2 days. He is discharged in stable condition is instructed return for any problems or concerns. Final Impression:  1.  Encounter for patient concern about exposure to infectious organism      DISPOSITION Discharge - Pending Orders Complete    Patient referred to:  Juliocesar Oliver PA-C  [address] [place] [address]  [phone]    Schedule an appointment as soon as possible for a visit in 1 week  As needed    Discharge medications:  New Prescriptions    No medications on file     (Please note that portions of this note may have been completed with a voice recognition program. Efforts were made to edit the dictations but occasionally words are mis-transcribed.)    Laurence Anderson Inge Abrams, [facility]  Board certified in [address]  11/14/20 [address] [address]  11/14/20 1523

## 2020-11-16 ENCOUNTER — CARE COORDINATION (OUTPATIENT)
Dept: CARE COORDINATION | Age: 85
End: 2020-11-16

## 2020-11-16 LAB
SARS-COV-2: NOT DETECTED
SOURCE: NORMAL

## 2020-11-16 PROCEDURE — 93010 ELECTROCARDIOGRAM REPORT: CPT | Performed by: INTERNAL MEDICINE

## 2020-11-16 NOTE — CARE COORDINATION
Protect Yourself    Plan for follow-up call in 7-14 days based on severity of symptoms and risk factors. Spoke with pt & his wife intermittently. Pt continues to have SOB with minimal exertion, heard during telephone encounter. Pt denies any additional symptoms or possible COVID exposures. He does not check his weight daily & is unsure if he has gained any water weight. He was on lasix, but is not now. Encouraged pt to follow up with cardiologist, wife reports he has appt on 12/9, encouraged to telephone cardio office to see if he should be seen sooner based on symptoms. Will route this information to PCP for review as well. WANG CortesN RN  Ambulatory Care Manager  [phone] office/cell  [phone] fax  [email or web address]

## 2020-11-17 LAB
EKG ATRIAL RATE: 69 BPM
EKG DIAGNOSIS: NORMAL
EKG P AXIS: 86 DEGREES
EKG P-R INTERVAL: 168 MS
EKG Q-T INTERVAL: 482 MS
EKG QRS DURATION: 160 MS
EKG QTC CALCULATION (BAZETT): 516 MS
EKG R AXIS: -33 DEGREES
EKG T AXIS: 139 DEGREES
EKG VENTRICULAR RATE: 69 BPM

## 2020-11-18 ENCOUNTER — TELEPHONE (OUTPATIENT)
Dept: FAMILY MEDICINE CLINIC | Age: 85
End: 2020-11-18

## 2020-11-19 RX ORDER — ALBUTEROL SULFATE 90 UG/1
2 AEROSOL, METERED RESPIRATORY (INHALATION) 4 TIMES DAILY PRN
Qty: 1 INHALER | Refills: 5 | Status: SHIPPED | OUTPATIENT
Start: 2020-11-19

## 2020-11-30 ENCOUNTER — CARE COORDINATION (OUTPATIENT)
Dept: CARE COORDINATION | Age: 85
End: 2020-11-30

## 2020-11-30 NOTE — CARE COORDINATION
You Patient resolved from the Care Transitions episode on 11/30/20. Discussed COVID-19 related testing which was available at this time. Test results were negative. Patient informed of results, if available? Already aware    Patient/family has been provided the following resources and education related to COVID-19:                         Signs, symptoms and red flags related to COVID-19            CDC exposure and quarantine guidelines            [employer] exposure contact - [phone]            Contact for their local Department of Health                 Patient currently reports that the following symptoms have improved:  increased SOB, fatigue     No further outreach scheduled with this CTN/ACM. Episode of Care resolved. Patient has this CTN/ACM contact information if future needs arise. Spoke with pt wife, she reports pt continues to have SOB with minimal exertion & wheezing sometimes. She reports he is using the inhaler with mild improvement. Pt has f/u with cardiology this week. Pt wife states she was unable to reach PCP office for follow up. WANG HardingN RN  Ambulatory Care Manager  [phone] office/cell  [phone] fax  [email or web address]

## 2020-12-02 ENCOUNTER — HOSPITAL ENCOUNTER (OUTPATIENT)
Age: 85
Discharge: HOME | End: 2020-12-02
Payer: MEDICARE

## 2020-12-02 ENCOUNTER — HOSPITAL ENCOUNTER (OUTPATIENT)
Dept: GENERAL RADIOLOGY | Age: 85
Discharge: HOME | End: 2020-12-02
Payer: MEDICARE

## 2020-12-02 ENCOUNTER — OFFICE VISIT (OUTPATIENT)
Dept: CARDIOLOGY CLINIC | Age: 85
End: 2020-12-02
Payer: MEDICARE

## 2020-12-02 VITALS
DIASTOLIC BLOOD PRESSURE: 76 MMHG | TEMPERATURE: 97.1 F | BODY MASS INDEX: 28.77 KG/M2 | SYSTOLIC BLOOD PRESSURE: 106 MMHG | HEART RATE: 64 BPM | RESPIRATION RATE: 16 BRPM | WEIGHT: 179 LBS | HEIGHT: 66 IN

## 2020-12-02 LAB — PRO-BNP: 5833 PG/ML

## 2020-12-02 PROCEDURE — 99215 OFFICE O/P EST HI 40 MIN: CPT | Performed by: INTERNAL MEDICINE

## 2020-12-02 PROCEDURE — 83880 ASSAY OF NATRIURETIC PEPTIDE: CPT

## 2020-12-02 PROCEDURE — 71046 X-RAY EXAM CHEST 2 VIEWS: CPT

## 2020-12-02 PROCEDURE — G8427 DOCREV CUR MEDS BY ELIG CLIN: HCPCS | Performed by: INTERNAL MEDICINE

## 2020-12-02 PROCEDURE — G8484 FLU IMMUNIZE NO ADMIN: HCPCS | Performed by: INTERNAL MEDICINE

## 2020-12-02 PROCEDURE — 4040F PNEUMOC VAC/ADMIN/RCVD: CPT | Performed by: INTERNAL MEDICINE

## 2020-12-02 PROCEDURE — G8417 CALC BMI ABV UP PARAM F/U: HCPCS | Performed by: INTERNAL MEDICINE

## 2020-12-02 PROCEDURE — 1123F ACP DISCUSS/DSCN MKR DOCD: CPT | Performed by: INTERNAL MEDICINE

## 2020-12-02 PROCEDURE — 1036F TOBACCO NON-USER: CPT | Performed by: INTERNAL MEDICINE

## 2020-12-02 PROCEDURE — 36415 COLL VENOUS BLD VENIPUNCTURE: CPT

## 2020-12-02 RX ORDER — FUROSEMIDE 20 MG/1
20 TABLET ORAL 2 TIMES DAILY
Qty: 60 TABLET | Refills: 3 | Status: SHIPPED | OUTPATIENT
Start: 2020-12-02 | End: 2021-03-22

## 2020-12-02 NOTE — PROGRESS NOTES
Darlene Ramey MD        OFFICE  FOLLOWUP NOTE    Chief complaints: patient is here for management of CAD cabg , HTN, AFIB, DYSLPIDEMIA, he had GI bleeding, sleep apnea, ckd    Subjective: + chest pain,+ shortness of breath, no dizziness, no palpitations    HPI Delio Monsivais is a 80 y. o.year old who  has a past medical history of Atrial fibrillation ([facility] [address] CAD (coronary artery disease), CHF (congestive heart failure) ([facility] [address] Chronic back pain, Excessive daytime sleepiness, Fractured rib, H/O cardiovascular stress test, H/O diagnostic tests, H/O Doppler ultrasound, H/O echocardiogram, History of echocardiogram, History of nuclear stress test, Hx of Doppler echocardiogram, Hx of echocardiogram, Hyperlipidemia, Hypertension, and Obstructive sleep apnea. and presents for management of CAD cabg , HTN, AFIB, DYSLPIDEMIA, he had GI bleeding, sleep apnea, ckd which are well controlled  He has not been feeling good, he had stopped lisinopril and lasix, he stopped maxide also. /    Current Outpatient Medications   Medication Sig Dispense Refill    albuterol sulfate  (90 Base) MCG/ACT inhaler Inhale 2 puffs into the lungs 4 times daily as needed for Wheezing 1 Inhaler 5    ezetimibe-simvastatin (VYTORIN) 10-20 MG per tablet TAKE 1 TABLET NIGHTLY 90 tablet 0    metoprolol tartrate (LOPRESSOR) 25 MG tablet Take 0.5 tablets by mouth 2 times daily 180 tablet 3    ranolazine (RANEXA) 1000 MG extended release tablet Take 1 tablet by mouth 2 times daily 180 tablet 3    sertraline (ZOLOFT) 100 MG tablet TAKE 1 TABLET DAILY 90 tablet 3    clopidogrel (PLAVIX) 75 MG tablet TAKE 1 TABLET DAILY 30 tablet 5    docusate sodium (COLACE, DULCOLAX) 100 MG CAPS Take 100 mg by mouth daily      acetaminophen (TYLENOL) 500 MG tablet Take 1,000 mg by mouth every 6 hours as needed for Pain      nitroGLYCERIN (NITROSTAT) 0.4 MG SL tablet PLACE 1 TABLET UNDER THE TONGUE EVERY 5 MINUTES AS NEEDED FOR CHEST PAIN 25 tablet 2    furosemide (LASIX) 40 MG tablet Take 1 tablet by mouth 2 times daily (Patient not taking: Reported on 12/2/2020) 60 tablet 0     No current facility-administered medications for this visit. Allergies: Patient has no known allergies. Past Medical History:   Diagnosis Date    Atrial fibrillation ([address]     CAD (coronary artery disease)     S/P CABG x 4 6/2014    [facility]congest[facility]) [facility])     Chronic back pain     Excessive daytime sleepiness 9/19/2018    Fractured rib 4th and 7th left rib    fracture 4th and 7th ribs    H/O cardiovascular stress test 5/22/2014    EF 50%. There is evidence of mild to moderate ischemia in the mid inferolateral regions.  H/O diagnostic tests 01/27/2011    Aortic Duplex scan. Normal study.  H/O Doppler ultrasound 07/19/2017    Carotid-Moderate disease of the Bilateral internal carotid arteries. Calcific plaque noted throughout.  H/O echocardiogram 7/19/17,6/3/14    EF 55-60% Mild AS Aortic valve leaflets are somewhat thickened. Mildly enlarged right atrium size and dimension.  History of echocardiogram 12/14/2016    LV function is normal. Mild aortic Stenosis noted.  History of nuclear stress test 7/18/17, 7/31/2018    Normal LV function. Normal study. EF 45%.  Hx of Doppler echocardiogram 05/08/2020    EF35-40%,moderately dilated left atrium,mild aortic stenosis,mild aortic regurg,moderate mitral and tricuspid regurg    Hx of echocardiogram 1/26/16    EF 40%, depressed LV function, Moderate LVH, moderate to severe aortic insufficiency and mild aortic stenosis.      Hyperlipidemia     Hypertension     Obstructive sleep apnea 10/29/2018     Past Surgical History:   Procedure Laterality Date    APPENDECTOMY      CHOLECYSTECTOMY      CORONARY ANGIOPLASTY WITH STENT PLACEMENT  2006    stent to the LAD and Circ    CORONARY ARTERY BYPASS GRAFT  6/4/14    CABG x4; LIMA to LAD & diag, SVG to CX marginal 1, SVG to CX marginal 2    DIAGNOSTIC Last 3 Encounters:   12/02/20 179 lb (81.2 kg)   11/14/20 181 lb (82.1 kg)   09/11/20 181 lb (82.1 kg)     Body mass index is 28.89 kg/m². GENERAL - Alert, oriented, pleasant, in no apparent distress,normal grooming  HEENT - pupils are reactive to light and accomodation, cornea intact, conjunctive normal color, ears are normal in exam,throat exam in normal, teeth, gum and palate are normal, oral mucosa is normal without any notation of pallor or cyanosis  Neck - Supple. No jugular venous distention noted. No carotid bruits, no apical -carotid delay  Respiratory:  Normal breath sounds, No respiratory distress, No wheezing, No chest tenderness. ,no use of accessory muscles, diaphragm movement is normal  Cardiovascular: (PMI) apex non displaced,no lifts no thrills, no s3,no s4, Normal heart rate, Normal rhythm, No murmurs, No rubs, No gallops. Carotid arteries pulse and amplitude are normal no bruit, no abdominal bruit noted ( normal abdominal aorta ausculation), femoral arteries pulse and amplitude are normal no bruit, pedal pulses are normal  Femoral pulses have normal amplitude, no bruits   Extremities - No cyanosis, clubbing, or significant edema, no varicose veins    Abdomen - No masses, tenderness, or organomegaly, no hepato-splenomegally, no bruits  Musculoskeletal - No significant edema, no kyphosis or scoliosis, no deformity in any extremity noted, muscle strength and tone are normal  Skin: no ulcer,no scar,no stasis dermatitis   Neurologic - alert oriented times 3,Cranial nerves II through XII are grossly intact. There were no gross focal neurologic abnormalities.  All sensory and motor nerves examined and were normal  Psychiatric: normal mood and affect    Lab Results   Component Value Date    CKTOTAL 53 05/19/2020     BNP:  No results found for: BNP  PT/INR:  No results found for: Virtual 3-D Display for Smartphones  Lab Results   Component Value Date    LABA1C 6.1 06/08/2014    LABA1C 5.8 06/03/2014     Lab Results   Component Value

## 2020-12-07 RX ORDER — EZETIMIBE AND SIMVASTATIN 10; 20 MG/1; MG/1
TABLET ORAL
Qty: 90 TABLET | Refills: 0 | Status: SHIPPED | OUTPATIENT
Start: 2020-12-07

## 2020-12-09 ENCOUNTER — OFFICE VISIT (OUTPATIENT)
Dept: CARDIOLOGY CLINIC | Age: 85
End: 2020-12-09
Payer: MEDICARE

## 2020-12-09 VITALS
HEIGHT: 66 IN | SYSTOLIC BLOOD PRESSURE: 110 MMHG | WEIGHT: 176 LBS | TEMPERATURE: 97.8 F | DIASTOLIC BLOOD PRESSURE: 60 MMHG | HEART RATE: 72 BPM | BODY MASS INDEX: 28.28 KG/M2 | RESPIRATION RATE: 18 BRPM

## 2020-12-09 PROCEDURE — 1123F ACP DISCUSS/DSCN MKR DOCD: CPT | Performed by: INTERNAL MEDICINE

## 2020-12-09 PROCEDURE — G8427 DOCREV CUR MEDS BY ELIG CLIN: HCPCS | Performed by: INTERNAL MEDICINE

## 2020-12-09 PROCEDURE — 1036F TOBACCO NON-USER: CPT | Performed by: INTERNAL MEDICINE

## 2020-12-09 PROCEDURE — G8484 FLU IMMUNIZE NO ADMIN: HCPCS | Performed by: INTERNAL MEDICINE

## 2020-12-09 PROCEDURE — 4040F PNEUMOC VAC/ADMIN/RCVD: CPT | Performed by: INTERNAL MEDICINE

## 2020-12-09 PROCEDURE — G8417 CALC BMI ABV UP PARAM F/U: HCPCS | Performed by: INTERNAL MEDICINE

## 2020-12-09 PROCEDURE — 99214 OFFICE O/P EST MOD 30 MIN: CPT | Performed by: INTERNAL MEDICINE

## 2020-12-09 NOTE — PROGRESS NOTES
Jose March MD        OFFICE  FOLLOWUP NOTE    Chief complaints: patient is here for management of  CAD cabg , HTN, AFIB, DYSLPIDEMIA, he had GI bleeding, sleep apnea, ckd    Subjective: patient feels better, no chest pain,MILD shortness of breath, no dizziness, no palpitations    HPI Artemio Benites is a 80 y. o.year old who  has a past medical history of Atrial fibrillation ([facility] [address] CAD (coronary artery disease), CHF (congestive heart failure) ([facility] [address] Chronic back pain, Excessive daytime sleepiness, Fractured rib, H/O cardiovascular stress test, H/O diagnostic tests, H/O Doppler ultrasound, H/O echocardiogram, History of echocardiogram, History of nuclear stress test, Hx of Doppler echocardiogram, Hx of echocardiogram, Hyperlipidemia, Hypertension, and Obstructive sleep apnea.  and presents for management of  CAD cabg , HTN, AFIB, DYSLPIDEMIA, he had GI bleeding, sleep apnea, ckd which are well controlled  Last time lasix 20mg daily was given, he lost 3 lbs\, his Probnp was > 5000     Current Outpatient Medications   Medication Sig Dispense Refill    ezetimibe-simvastatin (VYTORIN) 10-20 MG per tablet TAKE 1 TABLET NIGHTLY 90 tablet 0    furosemide (LASIX) 20 MG tablet Take 1 tablet by mouth 2 times daily 60 tablet 3    albuterol sulfate  (90 Base) MCG/ACT inhaler Inhale 2 puffs into the lungs 4 times daily as needed for Wheezing 1 Inhaler 5    metoprolol tartrate (LOPRESSOR) 25 MG tablet Take 0.5 tablets by mouth 2 times daily 180 tablet 3    ranolazine (RANEXA) 1000 MG extended release tablet Take 1 tablet by mouth 2 times daily 180 tablet 3    sertraline (ZOLOFT) 100 MG tablet TAKE 1 TABLET DAILY 90 tablet 3    clopidogrel (PLAVIX) 75 MG tablet TAKE 1 TABLET DAILY 30 tablet 5    furosemide (LASIX) 40 MG tablet Take 1 tablet by mouth 2 times daily 60 tablet 0    docusate sodium (COLACE, DULCOLAX) 100 MG CAPS Take 100 mg by mouth daily      acetaminophen (TYLENOL) 500 MG tablet Take 1,000 mg by mouth every 6 hours as needed for Pain      nitroGLYCERIN (NITROSTAT) 0.4 MG SL tablet PLACE 1 TABLET UNDER THE TONGUE EVERY 5 MINUTES AS NEEDED FOR CHEST PAIN 25 tablet 2     No current facility-administered medications for this visit. Allergies: Patient has no known allergies. Past Medical History:   Diagnosis Date    Atrial fibrillation ([facility] [address]     CAD (coronary artery disease)     S/P CABG x 4 6/2014    [facility]cong[facility]) ([facility])     Chronic back pain     Excessive daytime sleepiness 9/19/2018    Fractured rib 4th and 7th left rib    fracture 4th and 7th ribs    H/O cardiovascular stress test 5/22/2014    EF 50%. There is evidence of mild to moderate ischemia in the mid inferolateral regions.  H/O diagnostic tests 01/27/2011    Aortic Duplex scan. Normal study.  H/O Doppler ultrasound 07/19/2017    Carotid-Moderate disease of the Bilateral internal carotid arteries. Calcific plaque noted throughout.  H/O echocardiogram 7/19/17,6/3/14    EF 55-60% Mild AS Aortic valve leaflets are somewhat thickened. Mildly enlarged right atrium size and dimension.  History of echocardiogram 12/14/2016    LV function is normal. Mild aortic Stenosis noted.  History of nuclear stress test 7/18/17, 7/31/2018    Normal LV function. Normal study. EF 45%.  Hx of Doppler echocardiogram 05/08/2020    EF35-40%,moderately dilated left atrium,mild aortic stenosis,mild aortic regurg,moderate mitral and tricuspid regurg    Hx of echocardiogram 1/26/16    EF 40%, depressed LV function, Moderate LVH, moderate to severe aortic insufficiency and mild aortic stenosis.      Hyperlipidemia     Hypertension     Obstructive sleep apnea 10/29/2018     Past Surgical History:   Procedure Laterality Date    APPENDECTOMY      CHOLECYSTECTOMY      CORONARY ANGIOPLASTY WITH STENT PLACEMENT  2006    stent to the LAD and Circ    CORONARY ARTERY BYPASS GRAFT  6/4/14    CABG x4; LIMA to LAD & diag, SVG to CX marginal 1, SVG to CX marginal 2    DIAGNOSTIC CARDIAC CATH LAB PROCEDURE  2006    Normal LM, 60-70% ostial and prox LAD, 30% prox. 1st diag, 60% prox. circumflex (co-dominant vessel and collateralizes RCA), 80% OM2, 100% occluded RCA, very small PDA that fills by collaterals from CX, small abd aortic aneurysm, mild MR, normal aortic arch and origin of great vessels    EYE SURGERY Right     Cataract Extraction    EYE SURGERY Left 2014    Cataract Extraction    HERNIA REPAIR      Umbilical    TONSILLECTOMY Bilateral      Family History   Problem Relation Age of Onset    Heart Disease Father      Social History     Tobacco Use    Smoking status: Former Smoker     Packs/day: 1.00     Years: 25.00     Pack years: 25.00     Last attempt to quit: 1974     Years since quittin.6    Smokeless tobacco: Never Used   Substance Use Topics    Alcohol use: No      [unfilled]  Review of Systems:   · Constitutional: No Fever or Weight Loss   · Eyes: No Decreased Vision  · ENT: No Headaches, Hearing Loss or Vertigo  · Cardiovascular: No chest pain, dyspnea on exertion, palpitations or loss of consciousness  · Respiratory: No cough or wheezing    · Gastrointestinal: No abdominal pain, appetite loss, blood in stools, constipation, diarrhea or heartburn  · Genitourinary: No dysuria, trouble voiding, or hematuria  · Musculoskeletal:  No gait disturbance, weakness or joint complaints  · Integumentary: No rash or pruritis  · Neurological: No TIA or stroke symptoms  · Psychiatric: No anxiety or depression  · Endocrine: No malaise, fatigue or temperature intolerance  · Hematologic/Lymphatic: No bleeding problems, blood clots or swollen lymph nodes  · Allergic/Immunologic: No nasal congestion or hives  All systems negative except as marked.    Objective:  /60   Pulse 72   Temp 97.8 °F (36.6 °C)   Resp 18   Ht 5' 6\" (1.676 m)   Wt 176 lb (79.8 kg)   BMI 28.41 kg/m²   Wt Readings from Last 3 Encounters:   12/09/20 176 lb (79.8 kg)   12/02/20 179 lb (81.2 kg)   11/14/20 181 lb (82.1 kg)     Body mass index is 28.41 kg/m². GENERAL - Alert, oriented, pleasant, in no apparent distress,normal grooming  HEENT - pupils are reactive to light and accomodation, cornea intact, conjunctive normal color, ears are normal in exam,throat exam in normal, teeth, gum and palate are normal, oral mucosa is normal without any notation of pallor or cyanosis  Neck - Supple. No jugular venous distention noted. No carotid bruits, no apical -carotid delay  Respiratory:  Normal breath sounds, No respiratory distress, No wheezing, No chest tenderness. ,no use of accessory muscles, diaphragm movement is normal  Cardiovascular: (PMI) apex non displaced,no lifts no thrills, no s3,no s4, Normal heart rate, Normal rhythm, No murmurs, No rubs, No gallops. Carotid arteries pulse and amplitude are normal no bruit, no abdominal bruit noted ( normal abdominal aorta ausculation), femoral arteries pulse and amplitude are normal no bruit, pedal pulses are normal  Femoral pulses have normal amplitude, no bruits   Extremities - No cyanosis, clubbing, or significant edema, no varicose veins    Abdomen - No masses, tenderness, or organomegaly, no hepato-splenomegally, no bruits  Musculoskeletal - No significant edema, no kyphosis or scoliosis, no deformity in any extremity noted, muscle strength and tone are normal  Skin: no ulcer,no scar,no stasis dermatitis   Neurologic - alert oriented times 3,Cranial nerves II through XII are grossly intact. There were no gross focal neurologic abnormalities.  All sensory and motor nerves examined and were normal  Psychiatric: normal mood and affect    Lab Results   Component Value Date    CKTOTAL 53 05/19/2020     BNP:  No results found for: BNP  PT/INR:  No results found for: [place]  Lab Results   Component Value Date    LABA1C 6.1 06/08/2014    LABA1C 5.8 06/03/2014     Lab Results   Component Value Date CHOL 130 09/18/2019    TRIG 93 09/18/2019    HDL 65 (H) 09/18/2019    LDLCALC 46 09/18/2019     Lab Results   Component Value Date    ALT 12 06/11/2020    AST 24 06/11/2020     TSH:    Lab Results   Component Value Date    TSH 2.76 09/18/2019       Impression:  Alida Alonso is a 80 y. o.year old who  has a past medical history of Atrial fibrillation ([facility] [address] CAD (coronary artery disease), CHF (congestive heart failure) ([facility] [address] Chronic back pain, Excessive daytime sleepiness, Fractured rib, H/O cardiovascular stress test, H/O diagnostic tests, H/O Doppler ultrasound, H/O echocardiogram, History of echocardiogram, History of nuclear stress test, Hx of Doppler echocardiogram, Hx of echocardiogram, Hyperlipidemia, Hypertension, and Obstructive sleep apnea. and presents with     Plan:  1. Chest pain and shortness of breath: hold off stress test ordered, his EKG 2 weeks ago has frequent PVCs, will  cxr REVIWED, he had mild CHF, will continue lasix for now, he had initially stopped lasix and maxide,  2. CAD s/p CABG and history of PCI before CABG, has dypsnea on exertion:STABLE, [facility], 2018 reviewed, he has patent 4/4 grafts, his LVEF is 35-40%%,its been low for few yrs, case explained and discussed with patient  Continue plavix, vytorin, ranexa. lisinoril and lopressor  3. CKD: stable  4. NORMAL PFT, echo EF is 35-40%  5. Sleep apnea: recommend to start CPAP, patient did not start the CPAP  6. Paroxysmal afib:not a candidate for anticoagulation for GI bleeding and falls  7. Dyslipidemia: continue statins  8. HTN: stable, continue lopressor and lisinopril [facility] All labs, medications and tests reviewed, continue all other medications of all above medical condition listed as is.     [unfilled]

## 2021-01-18 ENCOUNTER — TELEPHONE (OUTPATIENT)
Dept: FAMILY MEDICINE CLINIC | Age: 86
End: 2021-01-18

## 2021-01-18 NOTE — TELEPHONE ENCOUNTER
Called patient to inform him to call and get his COVID vaccine he received his first dose already and is scheduled for his second dose.

## 2021-01-29 DIAGNOSIS — R94.39 ABNORMAL STRESS TEST: ICD-10-CM

## 2021-01-29 DIAGNOSIS — I20.8 EXERCISE-INDUCED ANGINA (HCC): ICD-10-CM

## 2021-01-29 RX ORDER — CLOPIDOGREL BISULFATE 75 MG/1
TABLET ORAL
Qty: 30 TABLET | Refills: 5 | Status: SHIPPED | OUTPATIENT
Start: 2021-01-29

## 2021-03-22 RX ORDER — FUROSEMIDE 20 MG/1
TABLET ORAL
Qty: 180 TABLET | Refills: 3 | Status: SHIPPED | OUTPATIENT
Start: 2021-03-22 | End: 2021-04-14

## 2021-03-22 NOTE — TELEPHONE ENCOUNTER
eRx to [facility] [address]:  Furosemide 20 mg qd #180 Rx3 pended and routed to Time Hodgson APRN-CNP for approval.

## 2021-03-24 ENCOUNTER — OFFICE VISIT (OUTPATIENT)
Dept: CARDIOLOGY CLINIC | Age: 86
End: 2021-03-24
Payer: MEDICARE

## 2021-03-24 VITALS
HEIGHT: 67 IN | SYSTOLIC BLOOD PRESSURE: 128 MMHG | BODY MASS INDEX: 27.62 KG/M2 | DIASTOLIC BLOOD PRESSURE: 70 MMHG | WEIGHT: 176 LBS

## 2021-03-24 DIAGNOSIS — R06.02 SHORTNESS OF BREATH: Primary | ICD-10-CM

## 2021-03-24 PROCEDURE — 93000 ELECTROCARDIOGRAM COMPLETE: CPT | Performed by: INTERNAL MEDICINE

## 2021-03-24 PROCEDURE — 99214 OFFICE O/P EST MOD 30 MIN: CPT | Performed by: INTERNAL MEDICINE

## 2021-03-24 NOTE — PROGRESS NOTES
Ernie Hoff MD        OFFICE  FOLLOWUP NOTE    Chief complaints: patient is here for management of CAD cabg , HTN, AFIB, DYSLPIDEMIA, he had GI bleeding, sleep apnea, ckd,.preop for dental work    Subjective: patient is not feeling good,lost 3-4 lbs. Feels weak    HPI Viktor Hernández is a 80 y. o.year old who  has a past medical history of Atrial fibrillation ([facility] [address] CAD (coronary artery disease), CHF (congestive heart failure) ([facility] [address] Chronic back pain, Excessive daytime sleepiness, Fractured rib, H/O cardiovascular stress test, H/O diagnostic tests, H/O Doppler ultrasound, H/O echocardiogram, History of echocardiogram, History of nuclear stress test, Hx of Doppler echocardiogram, Hx of echocardiogram, Hyperlipidemia, Hypertension, and Obstructive sleep apnea. and presents for management of CAD cabg , HTN, AFIB, DYSLPIDEMIA, he had GI bleeding, sleep apnea, ckd  which are well controlled    Few weeks ago, he was wheezing, did not feel good, his daughter  with covid, he had covid vaccine.   Current Outpatient Medications   Medication Sig Dispense Refill    furosemide (LASIX) 20 MG tablet TAKE 1 TABLET BY MOUTH TWICE A  tablet 3    clopidogrel (PLAVIX) 75 MG tablet TAKE 1 TABLET DAILY 30 tablet 5    ezetimibe-simvastatin (VYTORIN) 10-20 MG per tablet TAKE 1 TABLET NIGHTLY 90 tablet 0    albuterol sulfate  (90 Base) MCG/ACT inhaler Inhale 2 puffs into the lungs 4 times daily as needed for Wheezing 1 Inhaler 5    metoprolol tartrate (LOPRESSOR) 25 MG tablet Take 0.5 tablets by mouth 2 times daily 180 tablet 3    ranolazine (RANEXA) 1000 MG extended release tablet Take 1 tablet by mouth 2 times daily 180 tablet 3    sertraline (ZOLOFT) 100 MG tablet TAKE 1 TABLET DAILY 90 tablet 3    docusate sodium (COLACE, DULCOLAX) 100 MG CAPS Take 100 mg by mouth daily      acetaminophen (TYLENOL) 500 MG tablet Take 1,000 mg by mouth every 6 hours as needed for Pain      nitroGLYCERIN (NITROSTAT) 0.4 MG SL tablet PLACE 1 TABLET UNDER THE TONGUE EVERY 5 MINUTES AS NEEDED FOR CHEST PAIN 25 tablet 2     No current facility-administered medications for this visit. Allergies: Patient has no known allergies. Past Medical History:   Diagnosis Date    Atrial fibrillation ([address]     CAD (coronary artery disease)     S/P CABG x 4 6/2014    [facility] ([facility])     Chronic back pain     Excessive daytime sleepiness 9/19/2018    Fractured rib 4th and 7th left rib    fracture 4th and 7th ribs    H/O cardiovascular stress test 5/22/2014    EF 50%. There is evidence of mild to moderate ischemia in the mid inferolateral regions.  H/O diagnostic tests 01/27/2011    Aortic Duplex scan. Normal study.  H/O Doppler ultrasound 07/19/2017    Carotid-Moderate disease of the Bilateral internal carotid arteries. Calcific plaque noted throughout.  H/O echocardiogram 7/19/17,6/3/14    EF 55-60% Mild AS Aortic valve leaflets are somewhat thickened. Mildly enlarged right atrium size and dimension.  History of echocardiogram 12/14/2016    LV function is normal. Mild aortic Stenosis noted.  History of nuclear stress test 7/18/17, 7/31/2018    Normal LV function. Normal study. EF 45%.  Hx of Doppler echocardiogram 05/08/2020    EF35-40%,moderately dilated left atrium,mild aortic stenosis,mild aortic regurg,moderate mitral and tricuspid regurg    Hx of echocardiogram 1/26/16    EF 40%, depressed LV function, Moderate LVH, moderate to severe aortic insufficiency and mild aortic stenosis.      Hyperlipidemia     Hypertension     Obstructive sleep apnea 10/29/2018     Past Surgical History:   Procedure Laterality Date    APPENDECTOMY      CHOLECYSTECTOMY      CORONARY ANGIOPLASTY WITH STENT PLACEMENT  2006    stent to the LAD and Circ    CORONARY ARTERY BYPASS GRAFT  6/4/14    CABG x4; LIMA to LAD & diag, SVG to CX marginal 1, SVG to CX marginal 2    DIAGNOSTIC CARDIAC CATH LAB sleepiness, Fractured rib, H/O cardiovascular stress test, H/O diagnostic tests, H/O Doppler ultrasound, H/O echocardiogram, History of echocardiogram, History of nuclear stress test, Hx of Doppler echocardiogram, Hx of echocardiogram, Hyperlipidemia, Hypertension, and Obstructive sleep apnea. and presents with     Plan:  1. OK TO HAVE teeth pulled   2. Feeling weak, lost weight: basic labs ordered, recommend to see PMD.,ECHO ORDERED  3. Chest pain and shortness of breath: hold off stress test ordered, his EKG 2 weeks ago has frequent PVCs, will  cxr REVIWED, he had mild CHF, will continue lasix for now, he had initially stopped lasix and maxide,  4. CAD s/p CABG and history of PCI before CABG, has dypsnea on exertion:STABLE, [facility], 2018 reviewed, he has patent 4/4 grafts, his LVEF is 35-40%%,its been low for few yrs, case explained and discussed with patient  Continue plavix, vytorin, ranexa. lisinoril and lopressor  5. CKD: stable  6. NORMAL PFT, echo EF is 35-40%  7. Sleep apnea: recommend to start CPAP, patient did not start the CPAP  8. Paroxysmal afib:not a candidate for anticoagulation for GI bleeding and falls  9. Dyslipidemia: continue statins  10. HTN: stable, continue lopressor and lisinopril [facility] All labs,   All labs, medications and tests reviewed, continue all other medications of all above medical condition listed as is.     [unfilled]

## 2021-04-01 ENCOUNTER — TELEPHONE (OUTPATIENT)
Dept: CARDIOLOGY CLINIC | Age: 86
End: 2021-04-01

## 2021-04-01 NOTE — TELEPHONE ENCOUNTER
Received cardiac clearance request from Dr. Roldan Camops  for extraction of multiple teeth under IV anesthesia. Date of procedure is pending clearance. Dr. Olman Garcia saw patient recently for OV and advised he may proceed with extractions. Clearance form completed/signed per Gurjit Dasilva APRN-CNP  Cardiac clearance faxed to Dr. Mac Acevedo at fax # [phone] along with copy of recent visit note. See cardiac clearance form attached.

## 2021-04-08 ENCOUNTER — HOSPITAL ENCOUNTER (OUTPATIENT)
Age: 86
Discharge: HOME | End: 2021-04-08
Payer: MEDICARE

## 2021-04-08 LAB
ALBUMIN SERPL-MCNC: 3.8 GM/DL (ref 3.4–5)
ALP BLD-CCNC: 82 IU/L (ref 40–129)
ALT SERPL-CCNC: 24 U/L (ref 10–40)
ANION GAP SERPL CALCULATED.3IONS-SCNC: 17 MMOL/L (ref 4–16)
AST SERPL-CCNC: 38 IU/L (ref 15–37)
BASOPHILS ABSOLUTE: 0.1 K/CU MM
BASOPHILS RELATIVE PERCENT: 0.4 % (ref 0–1)
BILIRUB SERPL-MCNC: 1 MG/DL (ref 0–1)
BILIRUBIN DIRECT: 0.4 MG/DL (ref 0–0.3)
BILIRUBIN, INDIRECT: 0.6 MG/DL (ref 0–0.7)
BUN BLDV-MCNC: 32 MG/DL (ref 6–23)
CALCIUM SERPL-MCNC: 9.3 MG/DL (ref 8.3–10.6)
CHLORIDE BLD-SCNC: 99 MMOL/L (ref 99–110)
CHOLESTEROL, FASTING: 122 MG/DL
CO2: 17 MMOL/L (ref 21–32)
CREAT SERPL-MCNC: 1.5 MG/DL (ref 0.9–1.3)
DIFFERENTIAL TYPE: ABNORMAL
EOSINOPHILS ABSOLUTE: 0 K/CU MM
EOSINOPHILS RELATIVE PERCENT: 0.1 % (ref 0–3)
GFR AFRICAN AMERICAN: 54 ML/MIN/1.73M2
GFR NON-AFRICAN AMERICAN: 44 ML/MIN/1.73M2
GLUCOSE FASTING: 129 MG/DL (ref 70–99)
HCT VFR BLD CALC: 33.9 % (ref 42–52)
HDLC SERPL-MCNC: 55 MG/DL
HEMOGLOBIN: 10.5 GM/DL (ref 13.5–18)
IMMATURE NEUTROPHIL %: 1.2 % (ref 0–0.43)
LDL CHOLESTEROL DIRECT: 59 MG/DL
LYMPHOCYTES ABSOLUTE: 1 K/CU MM
LYMPHOCYTES RELATIVE PERCENT: 6.6 % (ref 24–44)
MCH RBC QN AUTO: 33.2 PG (ref 27–31)
MCHC RBC AUTO-ENTMCNC: 31 % (ref 32–36)
MCV RBC AUTO: 107.3 FL (ref 78–100)
MONOCYTES ABSOLUTE: 1.6 K/CU MM
MONOCYTES RELATIVE PERCENT: 10.2 % (ref 0–4)
PDW BLD-RTO: 16 % (ref 11.7–14.9)
PLATELET # BLD: 208 K/CU MM (ref 140–440)
PMV BLD AUTO: 9.8 FL (ref 7.5–11.1)
POTASSIUM SERPL-SCNC: 4.3 MMOL/L (ref 3.5–5.1)
RBC # BLD: 3.16 M/CU MM (ref 4.6–6.2)
SEGMENTED NEUTROPHILS ABSOLUTE COUNT: 12.8 K/CU MM
SEGMENTED NEUTROPHILS RELATIVE PERCENT: 81.5 % (ref 36–66)
SODIUM BLD-SCNC: 133 MMOL/L (ref 135–145)
T3 FREE: 2.1 PG/ML (ref 2.3–4.2)
T4 FREE: 1.11 NG/DL (ref 0.9–1.8)
TOTAL IMMATURE NEUTOROPHIL: 0.19 K/CU MM
TOTAL PROTEIN: 7.5 GM/DL (ref 6.4–8.2)
TRIGLYCERIDE, FASTING: 62 MG/DL
TSH HIGH SENSITIVITY: 4.26 UIU/ML (ref 0.27–4.2)
WBC # BLD: 15.7 K/CU MM (ref 4–10.5)

## 2021-04-08 PROCEDURE — 84481 FREE ASSAY (FT-3): CPT

## 2021-04-08 PROCEDURE — 85025 COMPLETE CBC W/AUTO DIFF WBC: CPT

## 2021-04-08 PROCEDURE — 82248 BILIRUBIN DIRECT: CPT

## 2021-04-08 PROCEDURE — 36415 COLL VENOUS BLD VENIPUNCTURE: CPT

## 2021-04-08 PROCEDURE — 80061 LIPID PANEL: CPT

## 2021-04-08 PROCEDURE — 84443 ASSAY THYROID STIM HORMONE: CPT

## 2021-04-08 PROCEDURE — 84439 ASSAY OF FREE THYROXINE: CPT

## 2021-04-08 PROCEDURE — 80053 COMPREHEN METABOLIC PANEL: CPT

## 2021-04-14 ENCOUNTER — OFFICE VISIT (OUTPATIENT)
Dept: CARDIOLOGY CLINIC | Age: 86
End: 2021-04-14
Payer: MEDICARE

## 2021-04-14 ENCOUNTER — PROCEDURE VISIT (OUTPATIENT)
Dept: CARDIOLOGY CLINIC | Age: 86
End: 2021-04-14
Payer: MEDICARE

## 2021-04-14 VITALS
BODY MASS INDEX: 27.62 KG/M2 | HEIGHT: 67 IN | WEIGHT: 176 LBS | DIASTOLIC BLOOD PRESSURE: 68 MMHG | HEART RATE: 70 BPM | SYSTOLIC BLOOD PRESSURE: 110 MMHG

## 2021-04-14 DIAGNOSIS — R06.02 SHORTNESS OF BREATH: Primary | ICD-10-CM

## 2021-04-14 DIAGNOSIS — I50.20 SYSTOLIC CONGESTIVE HEART FAILURE, UNSPECIFIED HF CHRONICITY (HCC): Primary | ICD-10-CM

## 2021-04-14 LAB
LV EF: 25 %
LVEF MODALITY: NORMAL

## 2021-04-14 PROCEDURE — 93306 TTE W/DOPPLER COMPLETE: CPT | Performed by: INTERNAL MEDICINE

## 2021-04-14 PROCEDURE — 99214 OFFICE O/P EST MOD 30 MIN: CPT | Performed by: INTERNAL MEDICINE

## 2021-04-14 PROCEDURE — G8427 DOCREV CUR MEDS BY ELIG CLIN: HCPCS | Performed by: INTERNAL MEDICINE

## 2021-04-14 PROCEDURE — G8417 CALC BMI ABV UP PARAM F/U: HCPCS | Performed by: INTERNAL MEDICINE

## 2021-04-14 PROCEDURE — 1036F TOBACCO NON-USER: CPT | Performed by: INTERNAL MEDICINE

## 2021-04-14 PROCEDURE — 4040F PNEUMOC VAC/ADMIN/RCVD: CPT | Performed by: INTERNAL MEDICINE

## 2021-04-14 PROCEDURE — 1123F ACP DISCUSS/DSCN MKR DOCD: CPT | Performed by: INTERNAL MEDICINE

## 2021-04-14 RX ORDER — FUROSEMIDE 20 MG/1
40 TABLET ORAL 2 TIMES DAILY
Qty: 180 TABLET | Refills: 3 | Status: ON HOLD | OUTPATIENT
Start: 2021-04-14 | End: 2021-04-17 | Stop reason: HOSPADM

## 2021-04-14 NOTE — PROGRESS NOTES
prox LAD, 30% prox. 1st diag, 60% prox. circumflex (co-dominant vessel and collateralizes RCA), 80% OM2, 100% occluded RCA, very small PDA that fills by collaterals from CX, small abd aortic aneurysm, mild MR, normal aortic arch and origin of great vessels    EYE SURGERY Right     Cataract Extraction    EYE SURGERY Left 2014    Cataract Extraction    HERNIA REPAIR      Umbilical    TONSILLECTOMY Bilateral      Family History   Problem Relation Age of Onset    Heart Disease Father      Social History     Tobacco Use    Smoking status: Former Smoker     Packs/day: 1.00     Years: 25.00     Pack years: 25.00     Quit date: 1974     Years since quittin.0    Smokeless tobacco: Never Used   Substance Use Topics    Alcohol use: No      [unfilled]  Review of Systems:   · Constitutional: No Fever or Weight Loss   · Eyes: No Decreased Vision  · ENT: No Headaches, Hearing Loss or Vertigo  · Cardiovascular: No chest pain,+, dyspnea on exertion, palpitations or loss of consciousness  · Respiratory: No cough or wheezing    · Gastrointestinal: No abdominal pain, appetite loss, blood in stools, constipation, diarrhea or heartburn  · Genitourinary: No dysuria, trouble voiding, or hematuria  · Musculoskeletal:  No gait disturbance, weakness or joint complaints  · Integumentary: No rash or pruritis  · Neurological: No TIA or stroke symptoms  · Psychiatric: No anxiety or depression  · Endocrine: No malaise, fatigue or temperature intolerance  · Hematologic/Lymphatic: No bleeding problems, blood clots or swollen lymph nodes  · Allergic/Immunologic: No nasal congestion or hives  All systems negative except as marked. Objective:  /68   Pulse 70   Ht 5' 7\" (1.702 m)   Wt 176 lb (79.8 kg)   BMI 27.57 kg/m²   Wt Readings from Last 3 Encounters:   21 176 lb (79.8 kg)   21 176 lb (79.8 kg)   20 176 lb (79.8 kg)     Body mass index is 27.57 kg/m².   GENERAL - Alert, oriented, pleasant, in no apparent distress,normal grooming  HEENT  pupils are intact, cornea intact, conjunctive normal color, ears are normal in exam,  Neck - Supple. No jugular venous distention noted. No carotid bruits, no apical -carotid delay  Respiratory:  decreased breath sounds,+ crackles, No respiratory distress, No wheezing, No chest tenderness. ,no use of accessory muscles, diaphragm movement is normal  Cardiovascular: (PMI) apex non displaced,no lifts no thrills, no s3,no s4, Normal heart rate, Normal rhythm, No murmurs, No rubs, No gallops. Carotid arteries pulse and amplitude are normal no bruit, no abdominal bruit noted ( normal abdominal aorta ausculation),   Extremities - No cyanosis, clubbing, or significant edema, no varicose veins    Abdomen  No masses, tenderness, or organomegaly, no hepato-splenomegally, no bruits  Musculoskeletal +edema, no kyphosis or scoliosis, no deformity in any extremity noted, muscle strength and tone are normal  Skin: no ulcer,no scar,no stasis dermatitis   Neurologic  alert oriented times 3,Cranial nerves II through XII are grossly intact. There were no gross focal neurologic abnormalities. Psychiatric: normal mood and affect    Lab Results   Component Value Date    CKTOTAL 53 05/19/2020     BNP:  No results found for: BNP  PT/INR:  No results found for: PTINR  Lab Results   Component Value Date    LABA1C 6.1 06/08/2014    LABA1C 5.8 06/03/2014     Lab Results   Component Value Date    CHOL 130 09/18/2019    TRIG 93 09/18/2019    HDL 55 04/08/2021    LDLCALC 46 09/18/2019    LDLDIRECT 59 04/08/2021     Lab Results   Component Value Date    ALT 24 04/08/2021    AST 38 (H) 04/08/2021     TSH:    Lab Results   Component Value Date    TSH 2.76 09/18/2019       Impression:  Chyna Schulte is a 80 y. o.year old who  has a past medical history of Atrial fibrillation ([facility] [address] CAD (coronary artery disease), CHF (congestive heart failure) ([facility] [address] Chronic back pain, Excessive daytime sleepiness, Fractured rib,

## 2021-04-15 ENCOUNTER — HOSPITAL ENCOUNTER (INPATIENT)
Age: 86
LOS: 1 days | Discharge: ANOTHER HOSPITAL | DRG: 291 | End: 2021-04-16
Attending: EMERGENCY MEDICINE | Admitting: INTERNAL MEDICINE
Payer: MEDICARE

## 2021-04-15 ENCOUNTER — APPOINTMENT (OUTPATIENT)
Dept: GENERAL RADIOLOGY | Age: 86
DRG: 291 | End: 2021-04-15
Payer: MEDICARE

## 2021-04-15 DIAGNOSIS — D72.829 LEUKOCYTOSIS, UNSPECIFIED TYPE: ICD-10-CM

## 2021-04-15 DIAGNOSIS — E87.70 HYPERVOLEMIA, UNSPECIFIED HYPERVOLEMIA TYPE: ICD-10-CM

## 2021-04-15 DIAGNOSIS — N17.9 AKI (ACUTE KIDNEY INJURY) (HCC): ICD-10-CM

## 2021-04-15 DIAGNOSIS — I44.7 LBBB (LEFT BUNDLE BRANCH BLOCK): ICD-10-CM

## 2021-04-15 DIAGNOSIS — R79.89 ELEVATED BRAIN NATRIURETIC PEPTIDE (BNP) LEVEL: ICD-10-CM

## 2021-04-15 DIAGNOSIS — D64.9 ANEMIA, UNSPECIFIED TYPE: ICD-10-CM

## 2021-04-15 DIAGNOSIS — R77.8 ELEVATED TROPONIN: ICD-10-CM

## 2021-04-15 DIAGNOSIS — R06.09 DYSPNEA ON EXERTION: Primary | ICD-10-CM

## 2021-04-15 DIAGNOSIS — I50.9 CONGESTIVE HEART FAILURE, UNSPECIFIED HF CHRONICITY, UNSPECIFIED HEART FAILURE TYPE (HCC): ICD-10-CM

## 2021-04-15 PROBLEM — R06.02 SHORTNESS OF BREATH: Status: ACTIVE | Noted: 2021-04-15

## 2021-04-15 LAB
ALBUMIN SERPL-MCNC: 3.4 GM/DL (ref 3.4–5)
ALP BLD-CCNC: 100 IU/L (ref 40–129)
ALT SERPL-CCNC: 44 U/L (ref 10–40)
ANION GAP SERPL CALCULATED.3IONS-SCNC: 9 MMOL/L (ref 4–16)
AST SERPL-CCNC: 39 IU/L (ref 15–37)
BASOPHILS ABSOLUTE: 0.1 K/CU MM
BASOPHILS RELATIVE PERCENT: 1 % (ref 0–1)
BILIRUB SERPL-MCNC: 0.5 MG/DL (ref 0–1)
BUN BLDV-MCNC: 37 MG/DL (ref 6–23)
CALCIUM SERPL-MCNC: 8.4 MG/DL (ref 8.3–10.6)
CHLORIDE BLD-SCNC: 100 MMOL/L (ref 99–110)
CO2: 26 MMOL/L (ref 21–32)
CREAT SERPL-MCNC: 1.6 MG/DL (ref 0.9–1.3)
DIFFERENTIAL TYPE: ABNORMAL
EOSINOPHILS ABSOLUTE: 0.3 K/CU MM
EOSINOPHILS RELATIVE PERCENT: 2.4 % (ref 0–3)
GFR AFRICAN AMERICAN: 50 ML/MIN/1.73M2
GFR NON-AFRICAN AMERICAN: 41 ML/MIN/1.73M2
GLUCOSE BLD-MCNC: 83 MG/DL (ref 70–99)
HCT VFR BLD CALC: 29 % (ref 42–52)
HEMOGLOBIN: 9.1 GM/DL (ref 13.5–18)
IMMATURE NEUTROPHIL %: 7.9 % (ref 0–0.43)
LYMPHOCYTES ABSOLUTE: 0.9 K/CU MM
LYMPHOCYTES RELATIVE PERCENT: 7.1 % (ref 24–44)
MCH RBC QN AUTO: 33.1 PG (ref 27–31)
MCHC RBC AUTO-ENTMCNC: 31.4 % (ref 32–36)
MCV RBC AUTO: 105.5 FL (ref 78–100)
MONOCYTES ABSOLUTE: 1.3 K/CU MM
MONOCYTES RELATIVE PERCENT: 10.4 % (ref 0–4)
PDW BLD-RTO: 17.2 % (ref 11.7–14.9)
PLATELET # BLD: 186 K/CU MM (ref 140–440)
PMV BLD AUTO: 9.8 FL (ref 7.5–11.1)
POTASSIUM SERPL-SCNC: 3.7 MMOL/L (ref 3.5–5.1)
PRO-BNP: 7074 PG/ML
RBC # BLD: 2.75 M/CU MM (ref 4.6–6.2)
SARS-COV-2, NAAT: NOT DETECTED
SEGMENTED NEUTROPHILS ABSOLUTE COUNT: 9.2 K/CU MM
SEGMENTED NEUTROPHILS RELATIVE PERCENT: 71.2 % (ref 36–66)
SODIUM BLD-SCNC: 135 MMOL/L (ref 135–145)
SOURCE: NORMAL
TOTAL IMMATURE NEUTOROPHIL: 1.02 K/CU MM
TOTAL PROTEIN: 6.3 GM/DL (ref 6.4–8.2)
TROPONIN T: 0.03 NG/ML
WBC # BLD: 12.9 K/CU MM (ref 4–10.5)

## 2021-04-15 PROCEDURE — 71046 X-RAY EXAM CHEST 2 VIEWS: CPT

## 2021-04-15 PROCEDURE — 83880 ASSAY OF NATRIURETIC PEPTIDE: CPT

## 2021-04-15 PROCEDURE — 80053 COMPREHEN METABOLIC PANEL: CPT

## 2021-04-15 PROCEDURE — 6370000000 HC RX 637 (ALT 250 FOR IP): Performed by: INTERNAL MEDICINE

## 2021-04-15 PROCEDURE — 6360000002 HC RX W HCPCS: Performed by: INTERNAL MEDICINE

## 2021-04-15 PROCEDURE — 99285 EMERGENCY DEPT VISIT HI MDM: CPT

## 2021-04-15 PROCEDURE — 96374 THER/PROPH/DIAG INJ IV PUSH: CPT

## 2021-04-15 PROCEDURE — 1200000000 HC SEMI PRIVATE

## 2021-04-15 PROCEDURE — 87635 SARS-COV-2 COVID-19 AMP PRB: CPT

## 2021-04-15 PROCEDURE — 93005 ELECTROCARDIOGRAM TRACING: CPT | Performed by: EMERGENCY MEDICINE

## 2021-04-15 PROCEDURE — 2580000003 HC RX 258: Performed by: INTERNAL MEDICINE

## 2021-04-15 PROCEDURE — 6360000002 HC RX W HCPCS: Performed by: EMERGENCY MEDICINE

## 2021-04-15 PROCEDURE — 93010 ELECTROCARDIOGRAM REPORT: CPT | Performed by: INTERNAL MEDICINE

## 2021-04-15 PROCEDURE — 84484 ASSAY OF TROPONIN QUANT: CPT

## 2021-04-15 PROCEDURE — 85025 COMPLETE CBC W/AUTO DIFF WBC: CPT

## 2021-04-15 RX ORDER — SODIUM CHLORIDE 0.9 % (FLUSH) 0.9 %
5-40 SYRINGE (ML) INJECTION EVERY 12 HOURS SCHEDULED
Status: DISCONTINUED | OUTPATIENT
Start: 2021-04-15 | End: 2021-04-16 | Stop reason: HOSPADM

## 2021-04-15 RX ORDER — SODIUM CHLORIDE 0.9 % (FLUSH) 0.9 %
5-40 SYRINGE (ML) INJECTION PRN
Status: CANCELLED | OUTPATIENT
Start: 2021-04-15

## 2021-04-15 RX ORDER — SODIUM CHLORIDE 0.9 % (FLUSH) 0.9 %
5-40 SYRINGE (ML) INJECTION PRN
Status: DISCONTINUED | OUTPATIENT
Start: 2021-04-15 | End: 2021-04-16 | Stop reason: HOSPADM

## 2021-04-15 RX ORDER — ACETAMINOPHEN 325 MG/1
650 TABLET ORAL EVERY 6 HOURS PRN
Status: DISCONTINUED | OUTPATIENT
Start: 2021-04-15 | End: 2021-04-16 | Stop reason: HOSPADM

## 2021-04-15 RX ORDER — FUROSEMIDE 10 MG/ML
40 INJECTION INTRAMUSCULAR; INTRAVENOUS 2 TIMES DAILY
Status: DISCONTINUED | OUTPATIENT
Start: 2021-04-15 | End: 2021-04-16 | Stop reason: HOSPADM

## 2021-04-15 RX ORDER — SODIUM CHLORIDE 9 MG/ML
25 INJECTION, SOLUTION INTRAVENOUS PRN
Status: DISCONTINUED | OUTPATIENT
Start: 2021-04-15 | End: 2021-04-16 | Stop reason: HOSPADM

## 2021-04-15 RX ORDER — SODIUM CHLORIDE 9 MG/ML
25 INJECTION, SOLUTION INTRAVENOUS PRN
Status: CANCELLED | OUTPATIENT
Start: 2021-04-15

## 2021-04-15 RX ORDER — PROMETHAZINE HYDROCHLORIDE 12.5 MG/1
12.5 TABLET ORAL EVERY 6 HOURS PRN
Status: CANCELLED | OUTPATIENT
Start: 2021-04-15

## 2021-04-15 RX ORDER — FUROSEMIDE 10 MG/ML
40 INJECTION INTRAMUSCULAR; INTRAVENOUS 2 TIMES DAILY
Status: CANCELLED | OUTPATIENT
Start: 2021-04-16

## 2021-04-15 RX ORDER — ACETAMINOPHEN 325 MG/1
650 TABLET ORAL EVERY 6 HOURS PRN
Status: CANCELLED | OUTPATIENT
Start: 2021-04-15

## 2021-04-15 RX ORDER — ACETAMINOPHEN 650 MG/1
650 SUPPOSITORY RECTAL EVERY 6 HOURS PRN
Status: CANCELLED | OUTPATIENT
Start: 2021-04-15

## 2021-04-15 RX ORDER — ONDANSETRON 2 MG/ML
4 INJECTION INTRAMUSCULAR; INTRAVENOUS EVERY 6 HOURS PRN
Status: DISCONTINUED | OUTPATIENT
Start: 2021-04-15 | End: 2021-04-15 | Stop reason: ALTCHOICE

## 2021-04-15 RX ORDER — SODIUM CHLORIDE 0.9 % (FLUSH) 0.9 %
5-40 SYRINGE (ML) INJECTION EVERY 12 HOURS SCHEDULED
Status: CANCELLED | OUTPATIENT
Start: 2021-04-15

## 2021-04-15 RX ORDER — POLYETHYLENE GLYCOL 3350 17 G/17G
17 POWDER, FOR SOLUTION ORAL DAILY PRN
Status: CANCELLED | OUTPATIENT
Start: 2021-04-15

## 2021-04-15 RX ORDER — PROMETHAZINE HYDROCHLORIDE 12.5 MG/1
12.5 TABLET ORAL EVERY 6 HOURS PRN
Status: DISCONTINUED | OUTPATIENT
Start: 2021-04-15 | End: 2021-04-16 | Stop reason: HOSPADM

## 2021-04-15 RX ORDER — FAMOTIDINE 20 MG/1
20 TABLET, FILM COATED ORAL DAILY
Status: CANCELLED | OUTPATIENT
Start: 2021-04-16

## 2021-04-15 RX ORDER — ACETAMINOPHEN 650 MG/1
650 SUPPOSITORY RECTAL EVERY 6 HOURS PRN
Status: DISCONTINUED | OUTPATIENT
Start: 2021-04-15 | End: 2021-04-16 | Stop reason: HOSPADM

## 2021-04-15 RX ORDER — DOBUTAMINE HYDROCHLORIDE 200 MG/100ML
2-40 INJECTION INTRAVENOUS CONTINUOUS
Status: DISCONTINUED | OUTPATIENT
Start: 2021-04-15 | End: 2021-04-15

## 2021-04-15 RX ORDER — FAMOTIDINE 20 MG/1
20 TABLET, FILM COATED ORAL DAILY
Status: DISCONTINUED | OUTPATIENT
Start: 2021-04-15 | End: 2021-04-16 | Stop reason: HOSPADM

## 2021-04-15 RX ORDER — POLYETHYLENE GLYCOL 3350 17 G/17G
17 POWDER, FOR SOLUTION ORAL DAILY PRN
Status: DISCONTINUED | OUTPATIENT
Start: 2021-04-15 | End: 2021-04-16 | Stop reason: HOSPADM

## 2021-04-15 RX ORDER — FUROSEMIDE 10 MG/ML
40 INJECTION INTRAMUSCULAR; INTRAVENOUS ONCE
Status: COMPLETED | OUTPATIENT
Start: 2021-04-15 | End: 2021-04-15

## 2021-04-15 RX ADMIN — SODIUM CHLORIDE, PRESERVATIVE FREE 10 ML: 5 INJECTION INTRAVENOUS at 20:40

## 2021-04-15 RX ADMIN — FUROSEMIDE 40 MG: 10 INJECTION, SOLUTION INTRAVENOUS at 12:14

## 2021-04-15 RX ADMIN — FAMOTIDINE 20 MG: 20 TABLET ORAL at 18:01

## 2021-04-15 RX ADMIN — ENOXAPARIN SODIUM 40 MG: 40 INJECTION SUBCUTANEOUS at 18:01

## 2021-04-15 RX ADMIN — FUROSEMIDE 40 MG: 10 INJECTION, SOLUTION INTRAVENOUS at 18:01

## 2021-04-15 ASSESSMENT — ENCOUNTER SYMPTOMS
PHOTOPHOBIA: 0
ABDOMINAL PAIN: 0
RHINORRHEA: 0
FACIAL SWELLING: 0
EYE REDNESS: 0
WHEEZING: 0
VOICE CHANGE: 0
SORE THROAT: 0
ABDOMINAL DISTENTION: 0
EYE ITCHING: 0
SHORTNESS OF BREATH: 1
COUGH: 0
DIARRHEA: 0
STRIDOR: 0
TROUBLE SWALLOWING: 0
CHEST TIGHTNESS: 0
ANAL BLEEDING: 0
BLOOD IN STOOL: 0
NAUSEA: 0
SINUS PRESSURE: 0
EYE DISCHARGE: 0
CONSTIPATION: 0
EYE PAIN: 0
VOMITING: 0
BACK PAIN: 0

## 2021-04-15 NOTE — H&P
Name:  Sj Chen /Age/Sex: 1935  (80 y.o. male)   MRN & CSN:  [id number] & [id number] Admission Date/Time: 4/15/2021 10:28 AM   Location:   PCP: Thom Luna PA-C       Date of Admission: 4/15/2021    Chief Complaint: Shortness of Breath (Dr Olman Garcia advised that pt be seen in ED and admitted to receive IV lasix )      History of Present Illness: The patient is a 80 y.o. male with a history of Atrial fibrillation ([facility] CAD (coronary artery disease), CHF (congestive heart failure) ([facility] Chronic back pain, Excessive daytime sleepiness, Fractured rib, H/O cardiovascular stress test, H/O diagnostic tests, H/O Doppler ultrasound, H/O echocardiogram, History of echocardiogram, History of nuclear stress test, Hx of Doppler echocardiogram, Hx of echocardiogram, Hyperlipidemia, Hypertension, and Obstructive sleep apnea. and presents for EDUIN/fluid retention. Patient was seen by Dr. Olman Garcia yesterday and recommended that patient go to ED for further evaluation and admission for left heart cath. Patient refused at that time and was sent home. Patient returned to [facility] ED today with worsening shortness of breath and states that he would like to continue with left heart cath. Dr. Olman Garcia recommended transfer to [address] to continue with LHC and further work-up for reduced EF and aortic stenosis. Patient's past medical, social, and family history have been reviewed. Patient case discussed with ED provider     Ten point ROS: reviewed negative, unless as noted in above HPI. Past Medical History:        Diagnosis Date    Atrial fibrillation ([facility]     CAD (coronary artery disease)     S/P CABG x 4 2014    CHF (congestive heart failure) (HCC)     Chronic back pain     Excessive daytime sleepiness 2018    Fractured rib 4th and 7th left rib    fracture 4th and 7th ribs    H/O cardiovascular stress test 2014    EF 50%.  There is evidence of mild to moderate ischemia in the mid inferolateral regions.  H/O diagnostic tests 01/27/2011    Aortic Duplex scan. Normal study.  H/O Doppler ultrasound 07/19/2017    Carotid-Moderate disease of the Bilateral internal carotid arteries. Calcific plaque noted throughout.  H/O echocardiogram 7/19/17,6/3/14    EF 55-60% Mild AS Aortic valve leaflets are somewhat thickened. Mildly enlarged right atrium size and dimension.  History of echocardiogram 12/14/2016    LV function is normal. Mild aortic Stenosis noted.  History of nuclear stress test 7/18/17, 7/31/2018    Normal LV function. Normal study. EF 45%.  Hx of Doppler echocardiogram 04/14/2021    Left ventricular systolic function is severely depressed  EF 25%. Moderate aortic stenosis with mean gradient of 10.6 mmHg. Moderate AR, Mil-Moderate MR. Moderate-Severe TR. Moderate Pulmonary HTN.  Hx of echocardiogram 01/26/2016    EF 40%, depressed LV function, Moderate LVH, moderate to severe aortic insufficiency and mild aortic stenosis.  Hyperlipidemia     Hypertension     Obstructive sleep apnea 10/29/2018       Past Surgical History:        Procedure Laterality Date    APPENDECTOMY      CHOLECYSTECTOMY      CORONARY ANGIOPLASTY WITH STENT PLACEMENT  2006    stent to the LAD and Circ    CORONARY ARTERY BYPASS GRAFT  6/4/14    CABG x4; LIMA to LAD & diag, SVG to CX marginal 1, SVG to CX marginal 2    DIAGNOSTIC CARDIAC CATH LAB PROCEDURE  03/07/2006    Normal LM, 60-70% ostial and prox LAD, 30% prox. 1st diag, 60% prox.  circumflex (co-dominant vessel and collateralizes RCA), 80% OM2, 100% occluded RCA, very small PDA that fills by collaterals from CX, small abd aortic aneurysm, mild MR, normal aortic arch and origin of great vessels    EYE SURGERY Right     Cataract Extraction    EYE SURGERY Left 11/21/2014    Cataract Extraction    HERNIA REPAIR      Umbilical    TONSILLECTOMY Bilateral        Medications Prior to Admission:    Prior to Admission medications    Medication Sig Start Date End Date Taking? Authorizing Provider   furosemide (LASIX) 20 MG tablet Take 2 tablets by mouth 2 times daily 4/14/21  Yes Jorge Lozoya MD   clopidogrel (PLAVIX) 75 MG tablet TAKE 1 TABLET DAILY 1/29/21  Yes Alton Burks PA-C   ezetimibe-simvastatin (VYTORIN) 10-20 MG per tablet TAKE 1 TABLET NIGHTLY 12/7/20  Yes Alton Bursk PA-C   albuterol sulfate  (90 Base) MCG/ACT inhaler Inhale 2 puffs into the lungs 4 times daily as needed for Wheezing 11/19/20  Yes Alton Burks PA-C   metoprolol tartrate (LOPRESSOR) 25 MG tablet Take 0.5 tablets by mouth 2 times daily 9/11/20  Yes Alton Burks PA-C   ranolazine (RANEXA) 1000 MG extended release tablet Take 1 tablet by mouth 2 times daily 9/11/20 9/11/21 Yes Alton Burks PA-C   sertraline (ZOLOFT) 100 MG tablet TAKE 1 TABLET DAILY 9/11/20  Yes Alton Burks PA-C   docusate sodium (COLACE, DULCOLAX) 100 MG CAPS Take 100 mg by mouth daily 5/29/20  Yes Jonelle Jones MD   acetaminophen (TYLENOL) 500 MG tablet Take 1,000 mg by mouth every 6 hours as needed for Pain   Yes Historical Provider, MD   nitroGLYCERIN (NITROSTAT) 0.4 MG SL tablet PLACE 1 TABLET UNDER THE TONGUE EVERY 5 MINUTES AS NEEDED FOR CHEST PAIN 5/2/18  Yes Jorge Lozoya MD       Allergies:    Patient has no known allergies. Social History:    TOBACCO:   reports that he quit smoking about 47 years ago. He has a 25.00 pack-year smoking history. He has never used smokeless tobacco.  ETOH:   reports no history of alcohol use. Family History:        Problem Relation Age of Onset    Heart Disease Father        Vitals:   Vitals:    04/15/21 1345 04/15/21 1415 04/15/21 1427 04/15/21 1509   BP: (!) 146/135 (!) 113/40  (!) 103/56   Pulse: 59 95 86 88   Resp: 17   22   Temp:    97.6 °F (36.4 °C)   TempSrc:    Tympanic   SpO2: 96% (!) 88% (!) 88% 98%   Weight:    174 lb 6.4 oz (79.1 kg)   Height:    5' 7\" (1.702 m)       Physical Exam  GEN -Awake. NAD.  Appears given age. EYES -PERRLA. No scleral erythema, discharge, or conjunctivitis. HENT -MM are moist. Oral pharynx without exudates, no evidence of thrush. NECK -Supple, no apparent thyromegaly or masses. RESP -diminished breath sounds bilateral lung bases with crackles, no wheezing or rhonchi. .  Symmetric chest movement while on room air. C/V -S1/S2 auscultated. RRR without appreciable M/R/G. No JVD or carotid bruits. Peripheral pulses equal bilaterally and palpable. Cap refill <3 sec. mild edema BLE. Samantha Kid GI -Abdomen is soft non distended and without significant tenderness to palpation. + BS. No masses or guarding.  -No CVA/ flank tenderness. Keith catheter is not present. LYMPH-No palpable cervical lymphadenopathy and no hepatosplenomegaly. No petechiae or ecchymoses. MS -No gross joint deformities. SKIN -Normal coloration, warm, dry. Sigifredo Gurinder, alert, oriented x  person, place, time, situation. Cranial nerves appear grossly intact, normal speech, no lateralizing weakness. PSYC- Appropriate affect. Imaging: Reviewed. Echo Complete 2d W Doppler W Color    Result Date: 4/14/2021  Transthoracic Echocardiography Report (TTE)  Demographics   Patient Name       Arlene Han      Date of Study       04/14/2021   Date of Birth      1935         Gender              Male   Age                80 year(s)         Race                   Patient Number     [id number]           Room Number   Visit Number       [id number]   Corporate ID       [id number]   Accession Number   [id number]         SMITHA PruettT   Ordering Physician Maureen Pendleton MD      Physician           Maria D Dobbins MD  Procedure Type of Study   TTE procedure:ECHOCARDIOGRAM COMPLETE 2D W DOPPLER W COLOR.   Procedure Date Date: 04/14/2021 Start: 03:44 PM Study Location: [address] [place] [address] Technical Quality: Adequate visualization Indications:Dyspnea/SOB, General Weakness and Fatigue. Conclusions   Summary  Left ventricular systolic function is severely depressed with an ejection  fraction of 25%. Mild septal wall asymmetrical left ventricular hypertrophy. The left atrium is moderately dilated. Mildly enlarged right atrium size. Dilatation of the aortic root measuring 3.9 cm. Moderate aortic stenosis with mean gradient of 10.6 mmHg. Aortic valve velocities and pressures underestimated due to systolic  dysfunction. Doppler evaluation reveals moderate aortic, mild to moderate mitral, and  moderate to severe tricuspid regurgitation. Right ventricular systolic pressure of 53 mmHg consistent with moderate  pulmonary hypertension. No evidence of pericardial effusion. Signature   ------------------------------------------------------------------  Electronically signed by Asia Garland MD  (Interpreting physician) on 04/14/2021 at 05:12 PM  ------------------------------------------------------------------   Findings   Left Ventricle  Left ventricular systolic function is severely depressed with an ejection  fraction of 25%. Mild septal wall asymmetrical left ventricular hypertrophy. Diastolic dysfunction could not be evaluated due to arrhythmia. Left Ventricular chamber size is Normal.  Regional wall motion difficult to assess due to low EF and arrhythmia. Left Atrium  The left atrium is moderately dilated. Right Atrium  Mildly enlarged right atrium size. Right Ventricle  Normal Right Ventricular size and function. Aortic Valve  Moderate aortic stenosis with mean gradient of 10.6 mmHg. Aortic valve velocities and pressures underestimated due to systolic  dysfunction. Moderate aortic regurgitation is noted with a pressure half time of 440  msec. Mitral Valve  Structurally normal mitral valve. Mild to moderate mitral regurgitation present. Tricuspid Valve  Tricuspid valve is structurally normal.  Moderate to severe tricuspid regurgitation. Right ventricular systolic pressure of 53 mmHg consistent with moderate  pulmonary hypertension. Pulmonic Valve  The pulmonic valve was not well visualized. Pericardial Effusion  No evidence of pericardial effusion. Pleural Effusion  No evidence of pleural effusion. Miscellaneous  Dilatation of the aortic root measuring 3.9 cm. IVC is dilated, but still maintains respiratory changes. Abdominal Aorta was not clearly visualized.   M-Mode/2D Measurements & Calculations   LV Diastolic Dimension:  LV Systolic Dimension:  AV Cusp Separation: 1.1  5.37 cm                  4.72 cm                 cmLA Dimension: 4.8 cmAO  LV FS:12.1 %             LV Volume Diastolic:    Root Dimension: 3.9 cmLA  LV PW Diastolic: 1.57 cm 468 ml                  Area: 29.3 cm2  Septum Diastolic: 9.32   LV Volume Systolic: 94  cm                       ml                           LV EDV/LV EDV Index:                           123 mlLV ESV/LV ESV     RV Diastolic Dimension:                           Index: 94 ml            3.55 cm  LV Area Diastolic: 12.2  EF Calculated (A4C):  cm2                      23.6 %                  LA/Aorta: 3.46  LV Area Systolic: 98.8   EF Calculated (2D):  cm2                      26.4 %                  LA volume/Index: 105 ml                            LV Length: 9.13 cm                            LVOT: 2.1 cm  Doppler Measurements & Calculations   MV Peak E-Wave: 113  AV Peak Velocity: 209.75 cm/s LVOT Mean Velocity: 53.82  cm/s                 AV Peak Gradient: 17.6 mmHg   cm/s                       AV Mean Velocity: 148.75 cm/s LVOT Mean Gradient: 2  MV Peak Gradient:    AV Mean Gradient: 10.67 mmHg  mmHg  5.11 mmHg            AV VTI: 47.18 cm              Estimated RVSP: 53 mmHg                       AV Area (Continuity):1.15 cm2 Estimated RAP:8 mmHg                        LVOT VTI: 15.62 cm AV P1/2t: 440 msec            TR Velocity:334 cm/s  MV E' Lateral        Estimated PASP: 52.62 mmHg    TR Gradient:44.62 mmHg  Velocity: 11.3 cm/s   MV E/E' lateral: 10      Xr Chest (2 Vw)    Result Date: 4/15/2021  EXAMINATION: TWO XRAY VIEWS OF THE CHEST 4/15/2021 10:49 am COMPARISON: 2020 HISTORY: ORDERING SYSTEM PROVIDED HISTORY: SOB TECHNOLOGIST PROVIDED HISTORY: Reason for exam:->SOB Additional signs and symptoms: sob FINDINGS: Cardiomediastinal silhouette is stable. Similar background bronchial wall thickening. No new focal consolidation. No pleural effusion or pneumothorax. Similar mild compression deformity at the midthoracic spine. Similar airways disease. Labs:  Reviewed  Lab Results:  CBC   Recent Labs     04/15/21  1050   WBC 12.9*   HGB 9.1*   HCT 29.0*         RENAL  Recent Labs     04/15/21  1050      K 3.7      CO2 26   BUN 37*   CREATININE 1.6*     LFT'S  Recent Labs     04/15/21  1050   AST 39*   ALT 44*   BILITOT 0.5   ALKPHOS 100     COAG  No results for input(s): INR in the last 72 hours. CARDIAC ENZYMES  No results for input(s): CKTOTAL, CKMB, CKMBINDEX, TROPONINI in the last 72 hours. U/A:    Lab Results   Component Value Date    NITRITE Negative 10/10/2018    COLORU YELLOW 2020    WBCUA <1 2020    RBCUA NONE SEEN 2020    MUCUS NEGATIVE 2016    BACTERIA NEGATIVE 2020    CLARITYU CLEAR 2020    SPECGRAV 1.016 2020    LEUKOCYTESUR NEGATIVE 2020    BLOODU NEGATIVE 2020    GLUCOSEU Negative 10/10/2018     ABG    Lab Results   Component Value Date    EOE1JQG 21.5 2014    BEART 2 2014    BZV4NBL 31.0 2014    PO2ART 50 2014         Medical Decision Makin.  CHF exacerbation : LVEF has decreased to 25% ( from 35-40%), and aortic stenosis has progressed to at least moderate, has CKD,recommend to admit to hosptial, and will need to diurese and work up for [employer] LVEF and aortic stenosis  2.  CAD s/p CABG and history of PCI before CABG, has dypsnea on exertion: [facility], 2018 reviewed, he has patent 4/4 grafts, his LVEF was  35-40%%,and now LVEF is 25%,    Pt admitted for Acute on Chronic systolic CHF    Chronic conditions:  Hyperlipidemia  -Continue statins    Hypertension  -Continue Lopressor lisinopril  CKD  -Stable    Patient assessment and plan discussed and reviewed with admitting physician:   Dr. Clyde Lee    DVT Prophylaxis: Lovenox  Diet: N.p.o.  Code Status: Limited intubation only, no chest compressions    Janette Chess' [facility], [facility] Hospitalist NP

## 2021-04-15 NOTE — PROGRESS NOTES
Nutrition Education    · Verbally reviewed information with Patient and with wife  · Educated on CHF nutrition guidelines  · Written educational materials provided. · Contact name and number provided. · Refer to Patient Education activity for more details. · Patient's wife was able to verbalize understanding of diet because he was been on at home.     Electronically signed by David Flowers RD, MUKUND on 4/15/21 at 5:27 PM EDT    Contact: [phone]

## 2021-04-15 NOTE — DISCHARGE SUMMARY
Srinivas Nicholser 1935 [id number]  PCP:  Roseann Ugarte PA-C    Admit date: 4/15/2021  Admitting Physician: Sanam Ghotra MD    Discharge date: 4/15/2021 Discharge Physician: TRISH Truong - NP      Reason for admission:   Chief Complaint   Patient presents with    Shortness of Breath     Dr Maryjane Mckeon advised that pt be seen in ED and admitted to receive IV lasix        Discharge Diagnoses Include:  1. Decompensated CHF  Present on Admission:   Shortness of breath      Hospital Course[de-identified] Patient was admitted from [place] ED this morning for increased shortness of breath and fluid retention. Patient was seen by Dr. Maryjane Mckeon yesterday and was recommended to report to ED for further work-up/admission. Patient refused at that time but has returned today wanting to continue with that plan. Patient was diuresed with IV Lasix. Dr. Maryjane Mckeon recommended transfer to [facility] for left heart cath and further evaluation of worsening EF/aortic stenosis. Patient hemodynamically stable will arrange for transfer. Pt was personally examined by me on the day of discharge with the following findings:     GEN    -Awake. NAD. Appears given age. EYES   -PERRLA. No scleral erythema, discharge, or conjunctivitis. HENT  -MM are moist. Oral pharynx without exudates, no evidence of thrush. NECK  -Supple, no apparent thyromegaly or masses. RESP  -diminished breath sounds bilateral lung bases with crackles, no wheezing or rhonchi. .  Symmetric chest movement while on room air. C/V      -S1/S2 auscultated. RRR without appreciable M/R/G. No JVD or carotid bruits. Peripheral pulses equal bilaterally and palpable. Cap refill <3 sec. mild edema BLE. Dorena Fernie GI        -Abdomen is soft non distended and without significant tenderness to palpation. + BS. No masses or guarding.        -No CVA/ flank tenderness. Keith catheter is not present. LYMPH-No palpable cervical lymphadenopathy and no hepatosplenomegaly.  No petechiae or ecchymoses. MS       -No gross joint deformities. SKIN    -Normal coloration, warm, dry. Michail Kettle, alert, oriented x  person, place, time, situation. Cranial nerves appear grossly intact, normal speech, no lateralizing weakness. PSYC- Appropriate affect. Significant Diagnostic Studies at discharge:       Patient Instructions:   Sarah Roberts   Home Medication Instructions [place]:[id number]    Printed on:04/15/21 1912   Medication Information                      acetaminophen (TYLENOL) 500 MG tablet  Take 1,000 mg by mouth every 6 hours as needed for Pain             albuterol sulfate  (90 Base) MCG/ACT inhaler  Inhale 2 puffs into the lungs 4 times daily as needed for Wheezing             clopidogrel (PLAVIX) 75 MG tablet  TAKE 1 TABLET DAILY             docusate sodium (COLACE, DULCOLAX) 100 MG CAPS  Take 100 mg by mouth daily             ezetimibe-simvastatin (VYTORIN) 10-20 MG per tablet  TAKE 1 TABLET NIGHTLY             furosemide (LASIX) 20 MG tablet  Take 2 tablets by mouth 2 times daily             metoprolol tartrate (LOPRESSOR) 25 MG tablet  Take 0.5 tablets by mouth 2 times daily             nitroGLYCERIN (NITROSTAT) 0.4 MG SL tablet  PLACE 1 TABLET UNDER THE TONGUE EVERY 5 MINUTES AS NEEDED FOR CHEST PAIN             ranolazine (RANEXA) 1000 MG extended release tablet  Take 1 tablet by mouth 2 times daily             sertraline (ZOLOFT) 100 MG tablet  TAKE 1 TABLET DAILY                  Code Status: Limited     Consults: cardiology    Diet: N.p.o.    Discharged Condition: stable    Prognosis: Fair - Good    Disposition: Transferred to [facility]      Follow-up with   1. PCP within   5-7 Days           Discharge Physician Signed: Electronically signed by TRISH Alvarez NP on 4/15/2021 at 7:12 PM.    The patient was seen and examined on day of discharge and this discharge summary is in conjunction with any daily progress note from day of discharge.   Time spent on discharge in the examination, evaluation, counseling and review of medications and discharge plan: >30 minutes

## 2021-04-15 NOTE — ED NOTES
[facility]  04/15/21 1891

## 2021-04-16 ENCOUNTER — HOSPITAL ENCOUNTER (INPATIENT)
Age: 86
LOS: 1 days | Discharge: HOME | DRG: 286 | End: 2021-04-17
Attending: INTERNAL MEDICINE | Admitting: INTERNAL MEDICINE
Payer: MEDICARE

## 2021-04-16 VITALS
TEMPERATURE: 97.7 F | DIASTOLIC BLOOD PRESSURE: 73 MMHG | OXYGEN SATURATION: 98 % | RESPIRATION RATE: 20 BRPM | BODY MASS INDEX: 27.37 KG/M2 | HEART RATE: 101 BPM | WEIGHT: 174.4 LBS | HEIGHT: 67 IN | SYSTOLIC BLOOD PRESSURE: 112 MMHG

## 2021-04-16 LAB
ANION GAP SERPL CALCULATED.3IONS-SCNC: 8 MMOL/L (ref 4–16)
BUN BLDV-MCNC: 34 MG/DL (ref 6–23)
CALCIUM SERPL-MCNC: 8 MG/DL (ref 8.3–10.6)
CHLORIDE BLD-SCNC: 99 MMOL/L (ref 99–110)
CO2: 32 MMOL/L (ref 21–32)
CREAT SERPL-MCNC: 1.6 MG/DL (ref 0.9–1.3)
EKG ATRIAL RATE: 62 BPM
EKG DIAGNOSIS: NORMAL
EKG P AXIS: 55 DEGREES
EKG P-R INTERVAL: 202 MS
EKG Q-T INTERVAL: 504 MS
EKG QRS DURATION: 168 MS
EKG QTC CALCULATION (BAZETT): 511 MS
EKG R AXIS: -42 DEGREES
EKG T AXIS: 135 DEGREES
EKG VENTRICULAR RATE: 62 BPM
GFR AFRICAN AMERICAN: 50 ML/MIN/1.73M2
GFR NON-AFRICAN AMERICAN: 41 ML/MIN/1.73M2
GLUCOSE BLD-MCNC: 89 MG/DL (ref 70–99)
IRON: 62 UG/DL (ref 59–158)
MAGNESIUM: 1.9 MG/DL (ref 1.8–2.4)
PCT TRANSFERRIN: 21 % (ref 10–44)
POTASSIUM SERPL-SCNC: 3.7 MMOL/L (ref 3.5–5.1)
SODIUM BLD-SCNC: 139 MMOL/L (ref 135–145)
TOTAL IRON BINDING CAPACITY: 297 UG/DL (ref 250–450)
TROPONIN T: 0.02 NG/ML
TROPONIN T: 0.03 NG/ML
TROPONIN T: 0.03 NG/ML
UNSATURATED IRON BINDING CAPACITY: 235 UG/DL (ref 110–370)

## 2021-04-16 PROCEDURE — 94761 N-INVAS EAR/PLS OXIMETRY MLT: CPT

## 2021-04-16 PROCEDURE — 2140000000 HC CCU INTERMEDIATE R&B

## 2021-04-16 PROCEDURE — 84484 ASSAY OF TROPONIN QUANT: CPT

## 2021-04-16 PROCEDURE — 83735 ASSAY OF MAGNESIUM: CPT

## 2021-04-16 PROCEDURE — 6370000000 HC RX 637 (ALT 250 FOR IP): Performed by: NURSE PRACTITIONER

## 2021-04-16 PROCEDURE — B2131ZZ FLUOROSCOPY OF MULTIPLE CORONARY ARTERY BYPASS GRAFTS USING LOW OSMOLAR CONTRAST: ICD-10-PCS | Performed by: INTERNAL MEDICINE

## 2021-04-16 PROCEDURE — C1751 CATH, INF, PER/CENT/MIDLINE: HCPCS

## 2021-04-16 PROCEDURE — 83550 IRON BINDING TEST: CPT

## 2021-04-16 PROCEDURE — C1892 INTRO/SHEATH,FIXED,PEEL-AWAY: HCPCS

## 2021-04-16 PROCEDURE — 93461 R&L HRT ART/VENTRICLE ANGIO: CPT

## 2021-04-16 PROCEDURE — 2500000003 HC RX 250 WO HCPCS

## 2021-04-16 PROCEDURE — C1894 INTRO/SHEATH, NON-LASER: HCPCS

## 2021-04-16 PROCEDURE — B2151ZZ FLUOROSCOPY OF LEFT HEART USING LOW OSMOLAR CONTRAST: ICD-10-PCS | Performed by: INTERNAL MEDICINE

## 2021-04-16 PROCEDURE — C1769 GUIDE WIRE: HCPCS

## 2021-04-16 PROCEDURE — 93461 R&L HRT ART/VENTRICLE ANGIO: CPT | Performed by: INTERNAL MEDICINE

## 2021-04-16 PROCEDURE — 99221 1ST HOSP IP/OBS SF/LOW 40: CPT | Performed by: INTERNAL MEDICINE

## 2021-04-16 PROCEDURE — 4A023N8 MEASUREMENT OF CARDIAC SAMPLING AND PRESSURE, BILATERAL, PERCUTANEOUS APPROACH: ICD-10-PCS | Performed by: INTERNAL MEDICINE

## 2021-04-16 PROCEDURE — 36415 COLL VENOUS BLD VENIPUNCTURE: CPT

## 2021-04-16 PROCEDURE — 80048 BASIC METABOLIC PNL TOTAL CA: CPT

## 2021-04-16 PROCEDURE — 6360000004 HC RX CONTRAST MEDICATION

## 2021-04-16 PROCEDURE — 6360000002 HC RX W HCPCS: Performed by: INTERNAL MEDICINE

## 2021-04-16 PROCEDURE — B2111ZZ FLUOROSCOPY OF MULTIPLE CORONARY ARTERIES USING LOW OSMOLAR CONTRAST: ICD-10-PCS | Performed by: INTERNAL MEDICINE

## 2021-04-16 PROCEDURE — 6370000000 HC RX 637 (ALT 250 FOR IP): Performed by: INTERNAL MEDICINE

## 2021-04-16 PROCEDURE — 83540 ASSAY OF IRON: CPT

## 2021-04-16 PROCEDURE — 2580000003 HC RX 258: Performed by: NURSE PRACTITIONER

## 2021-04-16 PROCEDURE — 2709999900 HC NON-CHARGEABLE SUPPLY

## 2021-04-16 PROCEDURE — 6360000002 HC RX W HCPCS

## 2021-04-16 RX ORDER — SODIUM CHLORIDE 9 MG/ML
25 INJECTION, SOLUTION INTRAVENOUS PRN
Status: DISCONTINUED | OUTPATIENT
Start: 2021-04-16 | End: 2021-04-17 | Stop reason: HOSPADM

## 2021-04-16 RX ORDER — RANOLAZINE 500 MG/1
1000 TABLET, EXTENDED RELEASE ORAL 2 TIMES DAILY
Status: CANCELLED | OUTPATIENT
Start: 2021-04-16

## 2021-04-16 RX ORDER — ACETAMINOPHEN 325 MG/1
650 TABLET ORAL EVERY 6 HOURS PRN
Status: CANCELLED | OUTPATIENT
Start: 2021-04-16

## 2021-04-16 RX ORDER — PROMETHAZINE HYDROCHLORIDE 25 MG/1
12.5 TABLET ORAL EVERY 6 HOURS PRN
Status: DISCONTINUED | OUTPATIENT
Start: 2021-04-16 | End: 2021-04-16 | Stop reason: SDUPTHER

## 2021-04-16 RX ORDER — SODIUM CHLORIDE 9 MG/ML
25 INJECTION, SOLUTION INTRAVENOUS PRN
Status: CANCELLED | OUTPATIENT
Start: 2021-04-16

## 2021-04-16 RX ORDER — POLYETHYLENE GLYCOL 3350 17 G/17G
17 POWDER, FOR SOLUTION ORAL DAILY PRN
Status: CANCELLED | OUTPATIENT
Start: 2021-04-16

## 2021-04-16 RX ORDER — FUROSEMIDE 10 MG/ML
40 INJECTION INTRAMUSCULAR; INTRAVENOUS 2 TIMES DAILY
Status: CANCELLED | OUTPATIENT
Start: 2021-04-16

## 2021-04-16 RX ORDER — PROMETHAZINE HYDROCHLORIDE 25 MG/1
12.5 TABLET ORAL EVERY 6 HOURS PRN
Status: CANCELLED | OUTPATIENT
Start: 2021-04-16

## 2021-04-16 RX ORDER — ONDANSETRON 2 MG/ML
4 INJECTION INTRAMUSCULAR; INTRAVENOUS EVERY 6 HOURS PRN
Status: CANCELLED | OUTPATIENT
Start: 2021-04-16

## 2021-04-16 RX ORDER — SODIUM CHLORIDE 9 MG/ML
INJECTION, SOLUTION INTRAVENOUS CONTINUOUS
Status: DISCONTINUED | OUTPATIENT
Start: 2021-04-16 | End: 2021-04-17

## 2021-04-16 RX ORDER — IPRATROPIUM BROMIDE AND ALBUTEROL SULFATE 2.5; .5 MG/3ML; MG/3ML
1 SOLUTION RESPIRATORY (INHALATION) EVERY 4 HOURS PRN
Status: DISCONTINUED | OUTPATIENT
Start: 2021-04-16 | End: 2021-04-17 | Stop reason: HOSPADM

## 2021-04-16 RX ORDER — IPRATROPIUM BROMIDE AND ALBUTEROL SULFATE 2.5; .5 MG/3ML; MG/3ML
1 SOLUTION RESPIRATORY (INHALATION) EVERY 4 HOURS PRN
Status: CANCELLED | OUTPATIENT
Start: 2021-04-16

## 2021-04-16 RX ORDER — ACETAMINOPHEN 650 MG/1
650 SUPPOSITORY RECTAL EVERY 6 HOURS PRN
Status: CANCELLED | OUTPATIENT
Start: 2021-04-16

## 2021-04-16 RX ORDER — CLOPIDOGREL BISULFATE 75 MG/1
75 TABLET ORAL DAILY
Status: DISCONTINUED | OUTPATIENT
Start: 2021-04-16 | End: 2021-04-17 | Stop reason: HOSPADM

## 2021-04-16 RX ORDER — SERTRALINE HYDROCHLORIDE 100 MG/1
100 TABLET, FILM COATED ORAL DAILY
Status: DISCONTINUED | OUTPATIENT
Start: 2021-04-16 | End: 2021-04-17 | Stop reason: HOSPADM

## 2021-04-16 RX ORDER — SODIUM CHLORIDE 0.9 % (FLUSH) 0.9 %
5-40 SYRINGE (ML) INJECTION EVERY 12 HOURS SCHEDULED
Status: DISCONTINUED | OUTPATIENT
Start: 2021-04-16 | End: 2021-04-17 | Stop reason: HOSPADM

## 2021-04-16 RX ORDER — FUROSEMIDE 10 MG/ML
40 INJECTION INTRAMUSCULAR; INTRAVENOUS 2 TIMES DAILY
Status: DISCONTINUED | OUTPATIENT
Start: 2021-04-16 | End: 2021-04-17

## 2021-04-16 RX ORDER — CLOPIDOGREL BISULFATE 75 MG/1
75 TABLET ORAL DAILY
Status: CANCELLED | OUTPATIENT
Start: 2021-04-16

## 2021-04-16 RX ORDER — ACETAMINOPHEN 650 MG/1
650 SUPPOSITORY RECTAL EVERY 6 HOURS PRN
Status: DISCONTINUED | OUTPATIENT
Start: 2021-04-16 | End: 2021-04-16 | Stop reason: SDUPTHER

## 2021-04-16 RX ORDER — FAMOTIDINE 20 MG/1
20 TABLET, FILM COATED ORAL DAILY
Status: DISCONTINUED | OUTPATIENT
Start: 2021-04-16 | End: 2021-04-17 | Stop reason: HOSPADM

## 2021-04-16 RX ORDER — SODIUM CHLORIDE 0.9 % (FLUSH) 0.9 %
5-40 SYRINGE (ML) INJECTION PRN
Status: CANCELLED | OUTPATIENT
Start: 2021-04-16

## 2021-04-16 RX ORDER — SODIUM CHLORIDE 0.9 % (FLUSH) 0.9 %
5-40 SYRINGE (ML) INJECTION PRN
Status: DISCONTINUED | OUTPATIENT
Start: 2021-04-16 | End: 2021-04-17 | Stop reason: HOSPADM

## 2021-04-16 RX ORDER — RANOLAZINE 500 MG/1
1000 TABLET, EXTENDED RELEASE ORAL 2 TIMES DAILY
Status: DISCONTINUED | OUTPATIENT
Start: 2021-04-16 | End: 2021-04-17 | Stop reason: HOSPADM

## 2021-04-16 RX ORDER — NITROGLYCERIN 0.4 MG/1
0.4 TABLET SUBLINGUAL EVERY 5 MIN PRN
Status: DISCONTINUED | OUTPATIENT
Start: 2021-04-16 | End: 2021-04-17 | Stop reason: HOSPADM

## 2021-04-16 RX ORDER — FUROSEMIDE 10 MG/ML
40 INJECTION INTRAMUSCULAR; INTRAVENOUS 2 TIMES DAILY
Status: DISCONTINUED | OUTPATIENT
Start: 2021-04-16 | End: 2021-04-16 | Stop reason: SDUPTHER

## 2021-04-16 RX ORDER — ACETAMINOPHEN 325 MG/1
650 TABLET ORAL EVERY 6 HOURS PRN
Status: DISCONTINUED | OUTPATIENT
Start: 2021-04-16 | End: 2021-04-16 | Stop reason: SDUPTHER

## 2021-04-16 RX ORDER — ACETAMINOPHEN 650 MG/1
650 SUPPOSITORY RECTAL EVERY 6 HOURS PRN
Status: DISCONTINUED | OUTPATIENT
Start: 2021-04-16 | End: 2021-04-17 | Stop reason: HOSPADM

## 2021-04-16 RX ORDER — SIMVASTATIN 40 MG
40 TABLET ORAL NIGHTLY
Status: DISCONTINUED | OUTPATIENT
Start: 2021-04-16 | End: 2021-04-17 | Stop reason: HOSPADM

## 2021-04-16 RX ORDER — PSEUDOEPHEDRINE HCL 30 MG
100 TABLET ORAL DAILY
Status: CANCELLED | OUTPATIENT
Start: 2021-04-16

## 2021-04-16 RX ORDER — ACETAMINOPHEN 325 MG/1
650 TABLET ORAL EVERY 6 HOURS PRN
Status: DISCONTINUED | OUTPATIENT
Start: 2021-04-16 | End: 2021-04-17 | Stop reason: HOSPADM

## 2021-04-16 RX ORDER — SIMVASTATIN 40 MG
40 TABLET ORAL NIGHTLY
Status: CANCELLED | OUTPATIENT
Start: 2021-04-16

## 2021-04-16 RX ORDER — ONDANSETRON 2 MG/ML
4 INJECTION INTRAMUSCULAR; INTRAVENOUS EVERY 6 HOURS PRN
Status: DISCONTINUED | OUTPATIENT
Start: 2021-04-16 | End: 2021-04-16

## 2021-04-16 RX ORDER — ACETAMINOPHEN 500 MG
1000 TABLET ORAL EVERY 6 HOURS PRN
Status: CANCELLED | OUTPATIENT
Start: 2021-04-16

## 2021-04-16 RX ORDER — POLYETHYLENE GLYCOL 3350 17 G/17G
17 POWDER, FOR SOLUTION ORAL DAILY PRN
Status: DISCONTINUED | OUTPATIENT
Start: 2021-04-16 | End: 2021-04-17 | Stop reason: HOSPADM

## 2021-04-16 RX ORDER — PROMETHAZINE HYDROCHLORIDE 25 MG/1
12.5 TABLET ORAL EVERY 6 HOURS PRN
Status: DISCONTINUED | OUTPATIENT
Start: 2021-04-16 | End: 2021-04-17 | Stop reason: HOSPADM

## 2021-04-16 RX ORDER — DOCUSATE SODIUM 100 MG/1
100 CAPSULE, LIQUID FILLED ORAL DAILY
Status: DISCONTINUED | OUTPATIENT
Start: 2021-04-16 | End: 2021-04-17 | Stop reason: HOSPADM

## 2021-04-16 RX ORDER — SODIUM CHLORIDE 0.9 % (FLUSH) 0.9 %
5-40 SYRINGE (ML) INJECTION EVERY 12 HOURS SCHEDULED
Status: CANCELLED | OUTPATIENT
Start: 2021-04-16

## 2021-04-16 RX ORDER — POLYETHYLENE GLYCOL 3350 17 G/17G
17 POWDER, FOR SOLUTION ORAL DAILY PRN
Status: DISCONTINUED | OUTPATIENT
Start: 2021-04-16 | End: 2021-04-16 | Stop reason: SDUPTHER

## 2021-04-16 RX ADMIN — FAMOTIDINE 20 MG: 20 TABLET ORAL at 14:55

## 2021-04-16 RX ADMIN — SODIUM CHLORIDE, PRESERVATIVE FREE 10 ML: 5 INJECTION INTRAVENOUS at 20:47

## 2021-04-16 RX ADMIN — METOPROLOL TARTRATE 12.5 MG: 25 TABLET, FILM COATED ORAL at 20:48

## 2021-04-16 RX ADMIN — CLOPIDOGREL BISULFATE 75 MG: 75 TABLET ORAL at 14:55

## 2021-04-16 RX ADMIN — RANOLAZINE 1000 MG: 500 TABLET, EXTENDED RELEASE ORAL at 20:47

## 2021-04-16 RX ADMIN — SERTRALINE HYDROCHLORIDE 100 MG: 100 TABLET ORAL at 14:54

## 2021-04-16 RX ADMIN — FUROSEMIDE 40 MG: 10 INJECTION, SOLUTION INTRAVENOUS at 14:56

## 2021-04-16 RX ADMIN — DOCUSATE SODIUM 100 MG: 100 CAPSULE, LIQUID FILLED ORAL at 14:55

## 2021-04-16 RX ADMIN — METOPROLOL TARTRATE 12.5 MG: 25 TABLET, FILM COATED ORAL at 14:54

## 2021-04-16 RX ADMIN — RANOLAZINE 1000 MG: 500 TABLET, EXTENDED RELEASE ORAL at 14:55

## 2021-04-16 RX ADMIN — SIMVASTATIN 40 MG: 40 TABLET, FILM COATED ORAL at 20:48

## 2021-04-16 ASSESSMENT — PAIN SCALES - GENERAL
PAINLEVEL_OUTOF10: 0
PAINLEVEL_OUTOF10: 0

## 2021-04-16 NOTE — PROGRESS NOTES
Called report to Chuy Al at [facility]. Pt will be going to room 3125. Awaiting transport company.

## 2021-04-16 NOTE — CONSULTS
Chart reviewed  Full note to follow  ADMITTED WITH CHF  C AND C                       Name:  Nat Schultz /Age/Sex: 1935  (80 y.o. male)   MRN & CSN:  [id number] & [id number] Admission Date/Time: 2021 12:57 AM   [facility] PCP: Larry Higuera, [facility] Day: 1          Referring physician:  Demond Garza MD         Reason for consultation:  chf        Thanks for referral.    Information source: patient    CC;  sob      HPI:   Thank you for involving me in taking  care of Nat Schultz who  is a 80 y. o.year  Old male  Presents with  H/o cad, as, vhd , as, transferred from [facility] for [address] [place] [address]. Has been having worsening SOB, worsening CHF, has no CP. Has h/o CAD with bypasses. Past medical history:    has a past medical history of Atrial fibrillation ([facility] [address] CAD (coronary artery disease), CHF (congestive heart failure) ([facility] [address] Chronic back pain, Excessive daytime sleepiness, Fractured rib, H/O cardiovascular stress test, H/O diagnostic tests, H/O Doppler ultrasound, H/O echocardiogram, History of echocardiogram, History of nuclear stress test, Hx of Doppler echocardiogram, Hx of echocardiogram, Hyperlipidemia, Hypertension, and Obstructive sleep apnea. Past surgical history:   has a past surgical history that includes Coronary angioplasty with stent (); Appendectomy; Tonsillectomy (Bilateral); Cholecystectomy; Coronary artery bypass graft (14); hernia repair; eye surgery (Right); eye surgery (Left, 2014); and Diagnostic Cardiac Cath Lab Procedure (2006). Social History:   reports that he quit smoking about 47 years ago. He has a 25.00 pack-year smoking history. He has never used smokeless tobacco. He reports that he does not drink alcohol or use drugs. Family history:  family history includes Heart Disease in his father.     No Known Allergies    sodium chloride flush 0.9 % injection 5-40 mL, 2 times per day  sodium chloride flush 0.9 % injection 5-40 mL, PRN  0.9 % sodium chloride infusion, PRN  promethazine (PHENERGAN) tablet 12.5 mg, Q6H PRN  polyethylene glycol (GLYCOLAX) packet 17 g, Daily PRN  acetaminophen (TYLENOL) tablet 650 mg, Q6H PRN    Or  acetaminophen (TYLENOL) suppository 650 mg, Q6H PRN  enoxaparin (LOVENOX) injection 40 mg, Daily  furosemide (LASIX) injection 40 mg, BID  ipratropium-albuterol (DUONEB) nebulizer solution 1 ampule, Q4H PRN  clopidogrel (PLAVIX) tablet 75 mg, Daily  docusate sodium (COLACE) capsule 100 mg, Daily  simvastatin (ZOCOR) tablet 40 mg, Nightly  metoprolol tartrate (LOPRESSOR) tablet 12.5 mg, BID  ranolazine (RANEXA) extended release tablet 1,000 mg, BID  sertraline (ZOLOFT) tablet 100 mg, Daily  nitroGLYCERIN (NITROSTAT) SL tablet 0.4 mg, Q5 Min PRN  0.9 % sodium chloride infusion, PRN  famotidine (PEPCID) tablet 20 mg, Daily  sodium chloride flush 0.9 % injection 5-40 mL, 2 times per day  sodium chloride flush 0.9 % injection 5-40 mL, PRN      Current Facility-Administered Medications   Medication Dose Route Frequency Provider Last Rate Last Admin    sodium chloride flush 0.9 % injection 5-40 mL  5-40 mL Intravenous 2 times per day Danya SCHILLING MD        sodium chloride flush 0.9 % injection 5-40 mL  5-40 mL Intravenous PRN Nathanielalyla Ac MD        0.9 % sodium chloride infusion  25 mL Intravenous PRN Nathaniel Queenie Ac MD        promethazine (PHENERGAN) tablet 12.5 mg  12.5 mg Oral Q6H PRN Nathaniel Ac MD        polyethylene glycol (GLYCOLAX) packet 17 g  17 g Oral Daily PRN Nathaniel Queenie Ac MD        acetaminophen (TYLENOL) tablet 650 mg  650 mg Oral Q6H PRN Nathaniel Queenie Ac MD        Or    acetaminophen (TYLENOL) suppository 650 mg  650 mg Rectal Q6H PRN Nathaniel Queenie Ac MD        enoxaparin (LOVENOX) injection 40 mg  40 mg Subcutaneous Daily Nathaniel Queenie Ac, MD        furosemide (LASIX) injection 40 mg  40 mg Intravenous BID Nathaniel Ac MD        ipratropium-albuterol (DUONEB) nebulizer solution 1 ampule  1 ampule Inhalation Q4H PRN Nathaniel Greco MD        clopidogrel (PLAVIX) tablet 75 mg  75 mg Oral Daily Nathaniel Greco MD        docusate sodium (COLACE) capsule 100 mg  100 mg Oral Daily Nathaniel Greco MD        simvastatin (ZOCOR) tablet 40 mg  40 mg Oral Nightly Nathaniel Greco MD        metoprolol tartrate (LOPRESSOR) tablet 12.5 mg  12.5 mg Oral BID Blanka Pichardo MD   Stopped at 04/16/21 0233    ranolazine (RANEXA) extended release tablet 1,000 mg  1,000 mg Oral BID Blanka Pichardo MD   Stopped at 04/16/21 0224    sertraline (ZOLOFT) tablet 100 mg  100 mg Oral Daily Nathaniel Greco MD        nitroGLYCERIN (NITROSTAT) SL tablet 0.4 mg  0.4 mg Sublingual Q5 Min PRN Nathaniel Greco MD        0.9 % sodium chloride infusion  25 mL Intravenous PRN Gómez Mccoyt, APRN - NP        famotidine (PEPCID) tablet 20 mg  20 mg Oral Daily Joelyn Amari, APRN - NP        sodium chloride flush 0.9 % injection 5-40 mL  5-40 mL Intravenous 2 times per day Joelyn Amari, APRN - NP        sodium chloride flush 0.9 % injection 5-40 mL  5-40 mL Intravenous PRN Joelyn Amari, APRN - NP         Review of Systems:  All 14 systems reviewed, all negative except for  CP, SOB    Physical Examination:    /64   Temp 98.7 °F (37.1 °C) (Oral)   SpO2 95%      Wt Readings from Last 3 Encounters:   04/15/21 174 lb 6.4 oz (79.1 kg)   04/14/21 176 lb (79.8 kg)   03/24/21 176 lb (79.8 kg)     There is no height or weight on file to calculate BMI.       General Appearance:  fair  Head: normocephalic     Eyes: normal, noninjected conjunctiva    ENT: normal mucosa, noninjected throat, normal     NECK: No JVP  No thyromegaly        Cardiovascular: No thrills palpated   Auscultation: Normal S1 and S2,  3/6 se murmur   carotid bruit no   Abdominal Aorta no bruit    Respiratory:    Breath sounds Diminshed bilaterally a  Extremities:  Trace Edema clubbing ,   no cyanosis    SKIN: Warm and well perfused, no pallor or cyanosis    Vascular exam:  Pedal Pulses: palp  bilaterally        Abdomen:  No masses or tenderness. No organomegaly noted. Neurological:  Oriented to time, place, and person   No focal neurological deficit noted. Psychiatric:normal mood, no anxiety    Lab Review   Recent Labs     04/15/21  1050   WBC 12.9*   HGB 9.1*   HCT 29.0*         Recent Labs     04/16/21  0151      K 3.7   CL 99   CO2 32   BUN 34*   CREATININE 1.6*     Recent Labs     04/15/21  1050   AST 39*   ALT 44*   BILITOT 0.5   ALKPHOS 100     No results for input(s): TROPONINI in the last 72 hours.   No results found for: BNP  Lab Results   Component Value Date    INR 1.08 08/14/2018    PROTIME 12.3 08/14/2018           Assessment/Recommendations:     - CHF; lhc and RHC, diurese  - CAD maximize med therapy  - risk factor modification  - HFrEf diurese  - pul htn sec to [facility] MD, 4/16/2021 7:45 AM

## 2021-04-16 NOTE — CONSULTS
Nutrition Education    · Dietitian consult for diet education received  · Attempted to verbally review diet education information with patient however pt off unit at time of visit  · Will f/u for diet education needs 4/19    Electronically signed by Courtney Roland MS, RD, LD on 4/16/21 at 1:37 PM EDT    Contact: [phone]

## 2021-04-16 NOTE — PROGRESS NOTES
Dr. America Timmons is on for Dr. Berlin Gregory and was notified via [employer] secure messaging that he has been consulted for this pt.

## 2021-04-16 NOTE — CARE COORDINATION
CM in to see Pt to discuss discharge planning. Pt spouse present. Pt has DME to include 5 canes, 3 walkers, and a wheelchair. Pt and spouse deny the need for [facility]. at this time. Pt states his granddaughter does home care and provides support as needed. Pt has insurance, pcp, and can afford medications. Pt and spouse deny any needs at this time. CM avaialble if needs arise.

## 2021-04-16 NOTE — PROGRESS NOTES
Pts wife, Satya Aparicio, called and explained that the pt would be transported to [facility] at midnight tonight. Pt and wife verbalized understanding.

## 2021-04-16 NOTE — H&P
HISTORY AND PHYSICAL  (Hospitalist, Internal Medicine)  IDENTIFYING INFORMATION   PATIENT:  Yisel Stevens  MRN:  [id number]  ADMIT DATE: 4/16/2021  TIME OF EVALUATION: 4/16/2021 1:27 AM    CHIEF COMPLAINT     SOB  HISTORY OF PRESENT ILLNESS   Yisel Stevens is a 80 y.o. male admitted for CHF exacerbation, transferred from [place] for possible LHC. Pt otherwise has no complaints of CP,  dizziness, N/V/C/D, abdominal pain, dysuria, joint pains, rash/boils, or fevers. See HPI from yesterday for full details. PMH listed below:    PAST MEDICAL, SURGICAL, FAMILY, and SOCIAL HISTORY     Past Medical History:   Diagnosis Date    Atrial fibrillation ([address]     CAD (coronary artery disease)     S/P CABG x 4 6/2014    CHF (congestive heart failure) (HCC)     Chronic back pain     Excessive daytime sleepiness 09/19/2018    Fractured rib 4th and 7th left rib    fracture 4th and 7th ribs    H/O cardiovascular stress test 05/22/2014    EF 50%. There is evidence of mild to moderate ischemia in the mid inferolateral regions.  H/O diagnostic tests 01/27/2011    Aortic Duplex scan. Normal study.  H/O Doppler ultrasound 07/19/2017    Carotid-Moderate disease of the Bilateral internal carotid arteries. Calcific plaque noted throughout.  H/O echocardiogram 7/19/17,6/3/14    EF 55-60% Mild AS Aortic valve leaflets are somewhat thickened. Mildly enlarged right atrium size and dimension.  History of echocardiogram 12/14/2016    LV function is normal. Mild aortic Stenosis noted.  History of nuclear stress test 7/18/17, 7/31/2018    Normal LV function. Normal study. EF 45%.  Hx of Doppler echocardiogram 04/14/2021    Left ventricular systolic function is severely depressed  EF 25%. Moderate aortic stenosis with mean gradient of 10.6 mmHg. Moderate AR, Mil-Moderate MR. Moderate-Severe TR. Moderate Pulmonary HTN.     Hx of echocardiogram 01/26/2016    EF 40%, depressed LV function, Moderate LVH, moderate to severe aortic insufficiency and mild aortic stenosis.  Hyperlipidemia     Hypertension     Obstructive sleep apnea 10/29/2018     Past Surgical History:   Procedure Laterality Date    APPENDECTOMY      CHOLECYSTECTOMY      CORONARY ANGIOPLASTY WITH STENT PLACEMENT      stent to the LAD and Circ    CORONARY ARTERY BYPASS GRAFT  14    CABG x4; LIMA to LAD & diag, SVG to CX marginal 1, SVG to CX marginal 2    DIAGNOSTIC CARDIAC CATH LAB PROCEDURE  2006    Normal LM, 60-70% ostial and prox LAD, 30% prox. 1st diag, 60% prox.  circumflex (co-dominant vessel and collateralizes RCA), 80% OM2, 100% occluded RCA, very small PDA that fills by collaterals from CX, small abd aortic aneurysm, mild MR, normal aortic arch and origin of great vessels    EYE SURGERY Right     Cataract Extraction    EYE SURGERY Left 2014    Cataract Extraction    HERNIA REPAIR      Umbilical    TONSILLECTOMY Bilateral      Family History   Problem Relation Age of Onset    Heart Disease Father      Family Hx of HTN  Family Hx as reviewed above, otherwise non-contributory  Social History     Socioeconomic History    Marital status:      Spouse name: Not on file    Number of children: Not on file    Years of education: Not on file    Highest education level: Not on file   Occupational History    Not on file   Social Needs    Financial resource strain: Not on file    Food insecurity     Worry: Not on file     Inability: Not on file    Transportation needs     Medical: Not on file     Non-medical: Not on file   Tobacco Use    Smoking status: Former Smoker     Packs/day: 1.00     Years: 25.00     Pack years: 25.00     Quit date: 1974     Years since quittin.0    Smokeless tobacco: Never Used   Substance and Sexual Activity    Alcohol use: No    Drug use: No    Sexual activity: Yes     Partners: Female     Comment:    Lifestyle    Physical activity     Days per week: Not on file Minutes per session: Not on file    Stress: Not on file   Relationships    Social connections     Talks on phone: Not on file     Gets together: Not on file     Attends Orthodoxy service: Not on file     Active member of club or organization: Not on file     Attends meetings of clubs or organizations: Not on file     Relationship status: Not on file    Intimate partner violence     Fear of current or ex partner: Not on file     Emotionally abused: Not on file     Physically abused: Not on file     Forced sexual activity: Not on file   Other Topics Concern    Not on file   Social History Narrative    Not on file       MEDICATIONS   Medications Prior to Admission  Medications Prior to Admission: furosemide (LASIX) 20 MG tablet, Take 2 tablets by mouth 2 times daily  clopidogrel (PLAVIX) 75 MG tablet, TAKE 1 TABLET DAILY  ezetimibe-simvastatin (VYTORIN) 10-20 MG per tablet, TAKE 1 TABLET NIGHTLY  albuterol sulfate  (90 Base) MCG/ACT inhaler, Inhale 2 puffs into the lungs 4 times daily as needed for Wheezing  metoprolol tartrate (LOPRESSOR) 25 MG tablet, Take 0.5 tablets by mouth 2 times daily  ranolazine (RANEXA) 1000 MG extended release tablet, Take 1 tablet by mouth 2 times daily  sertraline (ZOLOFT) 100 MG tablet, TAKE 1 TABLET DAILY  docusate sodium (COLACE, DULCOLAX) 100 MG CAPS, Take 100 mg by mouth daily  acetaminophen (TYLENOL) 500 MG tablet, Take 1,000 mg by mouth every 6 hours as needed for Pain  nitroGLYCERIN (NITROSTAT) 0.4 MG SL tablet, PLACE 1 TABLET UNDER THE TONGUE EVERY 5 MINUTES AS NEEDED FOR CHEST PAIN    Current Medications  No current facility-administered medications for this encounter. Allergies  No Known Allergies    REVIEW OF SYSTEMS   Within above limitations. 14 point review of systems reviewed. Pertinent positive or negative as per HPI or otherwise negative per 14 point systems review.       PHYSICAL EXAM     Wt Readings from Last 3 Encounters:   04/15/21 174 lb 6.4 oz (79.1 kg)   04/14/21 176 lb (79.8 kg)   03/24/21 176 lb (79.8 kg)       There were no vitals taken for this visit. General - AAO x 3  Psych - Appropriate affect/speech. No agitation  Eyes - Eye lids intact. No scleral icterus  ENT - Lips wnl. External ear clear/dry/intact. No thyromegaly on inspection  Neuro - No gross peripheral or central neuro deficits on inspection  Heart - Sinus. RRR. S1 and S2 present. No added HS/murmurs appreciated. B/L LE edema- trace  Lung - Adequate air entry b/l, crackles appreciated  GI - Soft. No guarding/rigidity. No hepatosplenomegaly/ascites. BS+   - No CVA/suprapubic tenderness or palpable bladder distension  Skin - Intact. No rash/petechiae/ecchymosis. Warm extremities  MSK - Joints with normal ROM.  No joint swellings    Lines/Drains/Airways/Wounds:  [unfilled]    LABS AND IMAGING   CBC  [unfilled]    Last 3 Hemoglobin  Lab Results   Component Value Date    HGB 9.1 04/15/2021    HGB 10.5 04/08/2021    HGB 11.2 06/11/2020     Last 3 WBC/ANC  Lab Results   Component Value Date    WBC 12.9 04/15/2021    WBC 15.7 04/08/2021    WBC 10.7 06/11/2020     No components found for: GRNLOCTYABS  Last 3 Platelets  No results found for: PLATELET  Chemistry  [unfilled]  [unfilled]  No results found for: LDH  Coagulation Studies  Lab Results   Component Value Date    INR 1.08 08/14/2018     Liver Function Studies  Lab Results   Component Value Date    ALT 44 04/15/2021    AST 39 04/15/2021    ALKPHOS 100 04/15/2021       Recent Imaging        Relevant labs and imaging reviewed    ASSESSMENT AND PLAN   Jewel Prieto is a 80 y.o. male p/w    CHF exacerbation : LVEF has decreased to 25% ( from 35-40%), and aortic stenosis has progressed to at least moderate, has CKD,recommend to admit to hosptial, and will need to diurese and work up for worseing LVEF and aortic stenosis  - IV lasix  - cardiology consult  - transferred for [facility], made NPO    CAD s/p CABG and history of PCI before CABG, has dypsnea on exertion: [facility], 2018 reviewed, he has patent 4/4 grafts, his LVEF was  35-40%%,and now LVEF is 25%  - c/w plavix    CKD- stable     Hyperlipidemia  -Continue statins     Hypertension  -Continue Lopressor lisinopril           Case d/w ED provider    DVT ppx:lovenox  Code status: limited, okay for intubations, no chest compressions    [facility], Internal Medicine  4/16/2021 at 1:27 AM

## 2021-04-17 VITALS
WEIGHT: 170 LBS | TEMPERATURE: 98.3 F | HEART RATE: 104 BPM | RESPIRATION RATE: 8 BRPM | OXYGEN SATURATION: 90 % | HEIGHT: 67 IN | BODY MASS INDEX: 26.68 KG/M2 | DIASTOLIC BLOOD PRESSURE: 57 MMHG | SYSTOLIC BLOOD PRESSURE: 89 MMHG

## 2021-04-17 PROCEDURE — 6360000002 HC RX W HCPCS: Performed by: INTERNAL MEDICINE

## 2021-04-17 PROCEDURE — 2580000003 HC RX 258: Performed by: INTERNAL MEDICINE

## 2021-04-17 PROCEDURE — 99232 SBSQ HOSP IP/OBS MODERATE 35: CPT | Performed by: INTERNAL MEDICINE

## 2021-04-17 PROCEDURE — 94761 N-INVAS EAR/PLS OXIMETRY MLT: CPT

## 2021-04-17 PROCEDURE — 6370000000 HC RX 637 (ALT 250 FOR IP): Performed by: NURSE PRACTITIONER

## 2021-04-17 PROCEDURE — 2580000003 HC RX 258: Performed by: NURSE PRACTITIONER

## 2021-04-17 PROCEDURE — 6370000000 HC RX 637 (ALT 250 FOR IP): Performed by: INTERNAL MEDICINE

## 2021-04-17 PROCEDURE — APPSS60 APP SPLIT SHARED TIME 46-60 MINUTES: Performed by: NURSE PRACTITIONER

## 2021-04-17 RX ORDER — FUROSEMIDE 40 MG/1
40 TABLET ORAL 2 TIMES DAILY
Qty: 60 TABLET | Refills: 3 | Status: SHIPPED | OUTPATIENT
Start: 2021-04-17

## 2021-04-17 RX ORDER — FUROSEMIDE 40 MG/1
40 TABLET ORAL 2 TIMES DAILY
Status: DISCONTINUED | OUTPATIENT
Start: 2021-04-17 | End: 2021-04-17 | Stop reason: HOSPADM

## 2021-04-17 RX ADMIN — DOCUSATE SODIUM 100 MG: 100 CAPSULE, LIQUID FILLED ORAL at 09:17

## 2021-04-17 RX ADMIN — Medication 10 ML: at 09:18

## 2021-04-17 RX ADMIN — CLOPIDOGREL BISULFATE 75 MG: 75 TABLET ORAL at 09:16

## 2021-04-17 RX ADMIN — SERTRALINE HYDROCHLORIDE 100 MG: 100 TABLET ORAL at 09:16

## 2021-04-17 RX ADMIN — FUROSEMIDE 40 MG: 10 INJECTION, SOLUTION INTRAVENOUS at 09:17

## 2021-04-17 RX ADMIN — ENOXAPARIN SODIUM 40 MG: 40 INJECTION SUBCUTANEOUS at 09:18

## 2021-04-17 RX ADMIN — SODIUM CHLORIDE: 9 INJECTION, SOLUTION INTRAVENOUS at 00:06

## 2021-04-17 RX ADMIN — RANOLAZINE 1000 MG: 500 TABLET, EXTENDED RELEASE ORAL at 09:17

## 2021-04-17 RX ADMIN — SODIUM CHLORIDE, PRESERVATIVE FREE 10 ML: 5 INJECTION INTRAVENOUS at 09:17

## 2021-04-17 RX ADMIN — FAMOTIDINE 20 MG: 20 TABLET ORAL at 09:17

## 2021-04-17 ASSESSMENT — PAIN SCALES - GENERAL: PAINLEVEL_OUTOF10: 0

## 2021-04-17 NOTE — PROGRESS NOTES
Cardiology Progress Note     Today's Plan: lasix PO, okay for D/C    Admit Date:  4/16/2021    Consult reason/ Seen today for: CHF, elevated troponin    Subjective and  Overnight Events:  Right groin site is free of hematoma, no bleeding, toes pink, no drainage, mobility intact, pulses palpable, patient denies any pain, wound is healing well. Assessment / Plan / Recommendation:     1. HFrEF :Acute on chronic decompensated heart failure. Appears to be euvolemic. Fluid status is -1.7 ml. Change Lasix to PO. Please continue Gudelines recommended medical thearpy including b- blocker. No ACEI or Aldactone due to CKD. Daily weights, strict Is and Os   2. CAD: CABG x 2, continue with ASA and Plavix, Lopressor, Zocor, Lasix  3. Aortic stenosis: moderate with mean gradient of 10.6 mmHg. 4. HTN:soft b/p but has improved continue with Lopressor 12.5 mg twice daily and Lasix 40 mg daily, can titrate accordingly   5. PAF: H/O GI bleed and fall, no anticoagulation at this time, secondary alternative of ASA and Plavix. Rate is controlled on Lopressor. 6. Dyslipidemia: continue statins  7. DVT prophylaxis if not contraindicated. 8. Patient is okay from discharge from cardiac standpoint. Has follow-up appointment with Dr. Vipin Pierre on Wednesday. LHC:4/16/21   Severe native CAD   Patent LIMA LAD   Patent SVG-OM  Normal RH pressures    Echo:4/14/21  Left ventricular systolic function is severely depressed with an ejection fraction of 25%. Mild septal wall asymmetrical left ventricular hypertrophy. The left atrium is moderately dilated. Mildly enlarged right atrium size. Dilatation of the aortic root measuring 3.9 cm. Moderate aortic stenosis with mean gradient of 10.6 mmHg. Aortic valve velocities and pressures underestimated due to systolic   dysfunction.    Doppler evaluation reveals moderate aortic, mild to moderate mitral, and moderate to severe tricuspid regurgitation. Right ventricular systolic pressure of 53 mmHg consistent with moderate   pulmonary hypertension. No evidence of pericardial effusion. History of Presenting Illness:    Chief complain on admission : 80 y. o.year old who is admitted forNo chief complaint on file. Past medical history:    has a past medical history of Atrial fibrillation ([facility] [address] CAD (coronary artery disease), CHF (congestive heart failure) ([facility] [address] Chronic back pain, Excessive daytime sleepiness, Fractured rib, H/O cardiovascular stress test, H/O diagnostic tests, H/O Doppler ultrasound, H/O echocardiogram, History of echocardiogram, History of nuclear stress test, Hx of Doppler echocardiogram, Hx of echocardiogram, Hyperlipidemia, Hypertension, and Obstructive sleep apnea. Past surgical history:   has a past surgical history that includes Coronary angioplasty with stent (2006); Appendectomy; Tonsillectomy (Bilateral); Cholecystectomy; Coronary artery bypass graft (6/4/14); hernia repair; eye surgery (Right); eye surgery (Left, 11/21/2014); and Diagnostic Cardiac Cath Lab Procedure (03/07/2006). Social History:   reports that he quit smoking about 47 years ago. He has a 25.00 pack-year smoking history. He has never used smokeless tobacco. He reports that he does not drink alcohol or use drugs. Family history:  family history includes Heart Disease in his father.     No Known Allergies    Review of Systems:  Review of Systems     BP (!) 89/57   Pulse 104   Temp 98.3 °F (36.8 °C) (Oral)   Resp 8   Ht 5' 7\" (1.702 m)   Wt 170 lb (77.1 kg)   SpO2 92%   BMI 26.63 kg/m²       Intake/Output Summary (Last 24 hours) at 4/17/2021 1019  Last data filed at 4/17/2021 0925  Gross per 24 hour   Intake 380 ml   Output 1500 ml   Net -1120 ml       Physical Exam:  Constitutional:  Well developed, Well nourished, No acute distress  HENT:  Normocephalic, Atraumatic, Bilateral external ears normal,  Nose normal.   Neck- trachea is midline  Eyes:  PERRL, Conjunctiva normal  Respiratory:  Normal breath sounds, No respiratory distress, No wheezing, No chest tenderness. Cardiovascular:  Normal heart rate, Normal rhythm, no murmurs appreciated, No rubs appreciated, No gallops appreciated, JVP not elevated  Abdomen/GI:  Soft, No tenderness  Musculoskeletal: Right groin site is free of hematoma, no bleeding, toes pink, no drainage, mobility intact, pulses palpable, patient denies any pain, wound is healing well. Integument:  Warm, Dry  Lymphatic:  No lymphadenopathy noted. Neurologic:  Alert & oriented  Psychiatric:  Affect and Mood :pleasant     Medications:    sodium chloride flush  5-40 mL Intravenous 2 times per day    enoxaparin  40 mg Subcutaneous Daily    furosemide  40 mg Intravenous BID    clopidogrel  75 mg Oral Daily    docusate sodium  100 mg Oral Daily    simvastatin  40 mg Oral Nightly    metoprolol tartrate  12.5 mg Oral BID    ranolazine  1,000 mg Oral BID    sertraline  100 mg Oral Daily    famotidine  20 mg Oral Daily    sodium chloride flush  5-40 mL Intravenous 2 times per day      sodium chloride      sodium chloride       sodium chloride flush, sodium chloride, promethazine **OR** [DISCONTINUED] ondansetron, polyethylene glycol, acetaminophen **OR** acetaminophen, ipratropium-albuterol, nitroGLYCERIN, sodium chloride, sodium chloride flush    Lab Data:  CBC:   Recent Labs     04/15/21  1050   WBC 12.9*   HGB 9.1*   HCT 29.0*   .5*        BMP:   Recent Labs     04/15/21  1050 04/16/21  0151    139   K 3.7 3.7    99   CO2 26 32   BUN 37* 34*   CREATININE 1.6* 1.6*     PT/INR: No results for input(s): PROTIME, INR in the last 72 hours.   BNP:    Recent Labs     04/15/21  1050   PROBNP 7,074*     TROPONIN:   Recent Labs     04/16/21  0151 04/16/21  1006 04/16/21  1532   TROPONINT 0.033* 0.020* 0.029*          Impression:  Active Problems:    Heart failure [facility])  Resolved Problems:    * No resolved hospital problems. *       All labs, medications and tests reviewed by myself, continue all other medications of all above medical condition listed as is except for changes mentioned above. Thank you   Please call with questions. Electronically signed by Sahil Rodriges. TRISH Smith - CNP on 4/17/2021 at 10:19 AM           CARDIOLOGY ATTENDING ADDENDUM    I have seen, spoken to and examined this patient personally, independently of the nurse practitioner. I have reviewed the hospital care given to date and reviewed all pertinent labs and imaging. The plan was developed mutually at the time of the visit with the patient,  NP   and myself. I have spoken with patient, nursing staff and provided written and verbal instructions . The above note has been reviewed and I agree with the assessment, diagnosis, and treatment plan with changes made by me as follows       HPI:  I have reviewed the above HPI  And agree with above   Please review addendum/changes made to note above     Interval history:            Physical Exam:  General:  Awake, alert, NAD  Head:normal  Eye:normal  Neck:  No JVD   Chest:  Clear to auscultation, respiration easy  Cardiovascular:  s1s2  Abdomen:   nontender  Extremities:  +1 edema  Pulses; palpable  Neuro: grossly normal      MEDICAL DECISION MAKING;    I agree with the above plan, which was planned by myself and discussed with NP.     Ok to d/c from card standpoint     Dr. Conrad Mccoy MD

## 2021-04-17 NOTE — PROGRESS NOTES
BP low this am. Pt refusing another BP check, explained the need for metoprolol and the need for a recheck. Pt continues to refuse.

## 2021-04-17 NOTE — DISCHARGE SUMMARY
Discharge Summary    Name:  Mira Barlow /Age/Sex: 1935  (80 y.o. male)   MRN & CSN:  [id number] & [id number] Admission Date/Time: 2021 12:57 AM   Attending:  Darian Valentin MD Discharging Physician: Darian Valentin MD     Hospital Course:   Mira Barlow is a 80 y.o.  male with history of CHF who was admitted by his cardiologist for right and left heart catheterization.    -Acute on chronic systolic CHF  EF 00% on echo. Left heart cath showed severe native CAD with patent LIMA-LAD and SVG-OM graft. Right heart cath showed normal right heart pressures. Cardiology recommends medical management. He was diuresed with IV Lasix and cardiology advised oral Lasix 40 mg twice daily at discharge. Outpatient follow-up with primary cardiologist within a week advised. -Ischemic cardiomyopathy  On beta-blocker. Currently not on ACEI/ARBs because of low BP. Further management by his primary cardiologist.    -CAD s/p CABG  Left heart cath findings as above. Continue Plavix, Ranexa. -CKD: Stable. He received IV fluids for about 12 hours after contrast exposure during left heart cath. -Hyperlipidemia: Continue ezetimibe and statin. -Moderate AS  -Atrial fibrillation: On metoprolol. Prior cardiologist note, not a candidate for anticoagulation due to GI bleeding and falls. The patient expressed appropriate understanding of and agreement with the discharge recommendations, medications, and plan. Consults this admission:  IP CONSULT TO HEART FAILURE NURSE/COORDINATOR  IP CONSULT TO DIETITIAN  IP CONSULT TO CARDIOLOGY  IP CONSULT TO HEART FAILURE NURSE/COORDINATOR  IP CONSULT TO DIETITIAN  IP CONSULT TO CARDIOLOGY    Discharge Instruction:   Follow up appointments   Santa Velasquez MD. Schedule an appointment as soon as possible for   a visit in 1 week. Specialty: Cardiology  Contact information:  [address] alert, oriented x 3. BMP/CBC  Recent Labs     04/15/21  1050 04/16/21  0151    139   K 3.7 3.7    99   CO2 26 32   BUN 37* 34*   CREATININE 1.6* 1.6*   WBC 12.9*  --    HCT 29.0*  --      --        IMAGING:    RHC and LHC 4/16/21   Normal right heart pressure. Cardiac Outpt 4.0   Severe native CAD   Patent LIMA LAD   Patent SVG-OM   Angiogram unchanged from prior cath     ECHO  Summary   Left ventricular systolic function is severely depressed with an ejection   fraction of 25%. Mild septal wall asymmetrical left ventricular hypertrophy. The left atrium is moderately dilated. Mildly enlarged right atrium size. Dilatation of the aortic root measuring 3.9 cm. Moderate aortic stenosis with mean gradient of 10.6 mmHg. Aortic valve velocities and pressures underestimated due to systolic   dysfunction. Doppler evaluation reveals moderate aortic, mild to moderate mitral, and   moderate to severe tricuspid regurgitation. Right ventricular systolic pressure of 53 mmHg consistent with moderate   pulmonary hypertension. No evidence of pericardial effusion.     Discharge Time of 35 minutes    Electronically signed by Demond Garza MD on 4/17/2021 at 12:10 PM

## 2021-04-19 NOTE — PROGRESS NOTES
Physician Progress Note      PATIENT:               Raoul De Anda  CSN #:                  [id number]  :                       1935  ADMIT DATE:       2021 12:57 AM  [address] [place] DATE:        2021 1:32 PM  RESPONDING  PROVIDER #:        Jem Berrios CNP        QUERY TEXT:    Stage of Chronic Kidney Disease: Please provide further specificity, if known. Clinical indicators include: chronic kidney disease, ckd, bun, creatinine,   bnp, probnp  Options provided:  -- Chronic kidney disease stage 1  -- Chronic kidney disease stage 2  -- Chronic kidney disease stage 3  -- Chronic kidney disease stage 3a  -- Chronic kidney disease stage 3b  -- Chronic kidney disease stage 4  -- Chronic kidney disease stage 5  -- Chronic kidney disease stage 5, requiring dialysis  -- End stage renal disease  -- Other - I will add my own diagnosis  -- Disagree - Not applicable / Not valid  -- Disagree - Clinically Unable to determine / Unknown        PROVIDER RESPONSE TEXT:    The patient has chronic kidney disease stage 3.       Electronically signed by:  Saima Berrios CNP 2021 2:22 PM

## 2021-04-21 ENCOUNTER — OFFICE VISIT (OUTPATIENT)
Dept: CARDIOLOGY CLINIC | Age: 86
End: 2021-04-21
Payer: MEDICARE

## 2021-04-21 VITALS
WEIGHT: 178 LBS | HEART RATE: 80 BPM | SYSTOLIC BLOOD PRESSURE: 102 MMHG | RESPIRATION RATE: 16 BRPM | DIASTOLIC BLOOD PRESSURE: 56 MMHG | BODY MASS INDEX: 27.88 KG/M2

## 2021-04-21 DIAGNOSIS — R06.02 SHORTNESS OF BREATH: Primary | ICD-10-CM

## 2021-04-21 PROCEDURE — 99214 OFFICE O/P EST MOD 30 MIN: CPT | Performed by: INTERNAL MEDICINE

## 2021-04-21 PROCEDURE — G8427 DOCREV CUR MEDS BY ELIG CLIN: HCPCS | Performed by: INTERNAL MEDICINE

## 2021-04-21 PROCEDURE — 1036F TOBACCO NON-USER: CPT | Performed by: INTERNAL MEDICINE

## 2021-04-21 PROCEDURE — 1111F DSCHRG MED/CURRENT MED MERGE: CPT | Performed by: INTERNAL MEDICINE

## 2021-04-21 PROCEDURE — 1123F ACP DISCUSS/DSCN MKR DOCD: CPT | Performed by: INTERNAL MEDICINE

## 2021-04-21 PROCEDURE — G8417 CALC BMI ABV UP PARAM F/U: HCPCS | Performed by: INTERNAL MEDICINE

## 2021-04-21 PROCEDURE — 4040F PNEUMOC VAC/ADMIN/RCVD: CPT | Performed by: INTERNAL MEDICINE

## 2021-04-21 NOTE — PROGRESS NOTES
Alexis Castro MD        OFFICE  FOLLOWUP NOTE    Chief complaints: patient is here for management of CAD cabg , HTN, AFIB, DYSLPIDEMIA, he had GI bleeding, sleep apnea, ckd,. Post hospital dicharge follow up: [facility] unchanged from last time, PCWP 16 , HE was diuresed with IV lasix,     Subjective: patient feels better, no chest pain, no shortness of breath, no dizziness, no palpitations    HPI Hiram Buerger is a 80 y. o.year old who  has a past medical history of Atrial fibrillation ([address] CAD (coronary artery disease), CHF (congestive heart failure) ([address] Chronic back pain, Excessive daytime sleepiness, Fractured rib, H/O cardiovascular stress test, H/O diagnostic tests, H/O Doppler ultrasound, H/O echocardiogram, History of echocardiogram, History of nuclear stress test, Hx of Doppler echocardiogram, Hx of echocardiogram, Hyperlipidemia, Hypertension, and Obstructive sleep apnea. and presents for management of CAD cabg , HTN, AFIB, DYSLPIDEMIA, he had GI bleeding, sleep apnea, ckd,. which are well controlled      Current Outpatient Medications   Medication Sig Dispense Refill    furosemide (LASIX) 40 MG tablet Take 1 tablet by mouth 2 times daily 60 tablet 3    clopidogrel (PLAVIX) 75 MG tablet TAKE 1 TABLET DAILY 30 tablet 5    ezetimibe-simvastatin (VYTORIN) 10-20 MG per tablet TAKE 1 TABLET NIGHTLY 90 tablet 0    albuterol sulfate  (90 Base) MCG/ACT inhaler Inhale 2 puffs into the lungs 4 times daily as needed for Wheezing 1 Inhaler 5    metoprolol tartrate (LOPRESSOR) 25 MG tablet Take 0.5 tablets by mouth 2 times daily 180 tablet 3    ranolazine (RANEXA) 1000 MG extended release tablet Take 1 tablet by mouth 2 times daily 180 tablet 3    sertraline (ZOLOFT) 100 MG tablet TAKE 1 TABLET DAILY 90 tablet 3    docusate sodium (COLACE, DULCOLAX) 100 MG CAPS Take 100 mg by mouth daily      acetaminophen (TYLENOL) 500 MG tablet Take 1,000 mg by mouth every 6 hours as needed for Pain      nitroGLYCERIN (NITROSTAT) 0.4 MG SL tablet PLACE 1 TABLET UNDER THE TONGUE EVERY 5 MINUTES AS NEEDED FOR CHEST PAIN 25 tablet 2     No current facility-administered medications for this visit. Allergies: Patient has no known allergies. Past Medical History:   Diagnosis Date    Atrial fibrillation ([address]     CAD (coronary artery disease)     S/P CABG x 4 6/2014    CHF [facility]congestive heart failure) ([facility])     Chronic back pain     Excessive daytime sleepiness 09/19/2018    Fractured rib 4th and 7th left rib    fracture 4th and 7th ribs    H/O cardiovascular stress test 05/22/2014    EF 50%. There is evidence of mild to moderate ischemia in the mid inferolateral regions.  H/O diagnostic tests 01/27/2011    Aortic Duplex scan. Normal study.  H/O Doppler ultrasound 07/19/2017    Carotid-Moderate disease of the Bilateral internal carotid arteries. Calcific plaque noted throughout.  H/O echocardiogram 7/19/17,6/3/14    EF 55-60% Mild AS Aortic valve leaflets are somewhat thickened. Mildly enlarged right atrium size and dimension.  History of echocardiogram 12/14/2016    LV function is normal. Mild aortic Stenosis noted.  History of nuclear stress test 7/18/17, 7/31/2018    Normal LV function. Normal study. EF 45%.  Hx of Doppler echocardiogram 04/14/2021    Left ventricular systolic function is severely depressed  EF 25%. Moderate aortic stenosis with mean gradient of 10.6 mmHg. Moderate AR, Mil-Moderate MR. Moderate-Severe TR. Moderate Pulmonary HTN.  Hx of echocardiogram 01/26/2016    EF 40%, depressed LV function, Moderate LVH, moderate to severe aortic insufficiency and mild aortic stenosis.      Hyperlipidemia     Hypertension     Obstructive sleep apnea 10/29/2018     Past Surgical History:   Procedure Laterality Date    APPENDECTOMY      CHOLECYSTECTOMY      CORONARY ANGIOPLASTY WITH STENT PLACEMENT  2006    stent to the LAD and Circ    CORONARY ARTERY BYPASS GRAFT  6/4/14 CABG x4; LIMA to LAD & diag, SVG to CX marginal 1, SVG to CX marginal 2    DIAGNOSTIC CARDIAC CATH LAB PROCEDURE  2006    Normal LM, 60-70% ostial and prox LAD, 30% prox. 1st diag, 60% prox. circumflex (co-dominant vessel and collateralizes RCA), 80% OM2, 100% occluded RCA, very small PDA that fills by collaterals from CX, small abd aortic aneurysm, mild MR, normal aortic arch and origin of great vessels    EYE SURGERY Right     Cataract Extraction    EYE SURGERY Left 2014    Cataract Extraction    HERNIA REPAIR      Umbilical    TONSILLECTOMY Bilateral      Family History   Problem Relation Age of Onset    Heart Disease Father      Social History     Tobacco Use    Smoking status: Former Smoker     Packs/day: 1.00     Years: 25.00     Pack years: 25.00     Quit date: 1974     Years since quittin.0    Smokeless tobacco: Never Used   Substance Use Topics    Alcohol use: No      [unfilled]  Review of Systems:   · Constitutional: No Fever or Weight Loss   · Eyes: No Decreased Vision  · ENT: No Headaches, Hearing Loss or Vertigo  · Cardiovascular: No chest pain, dyspnea on exertion, palpitations or loss of consciousness  · Respiratory: No cough or wheezing    · Gastrointestinal: No abdominal pain, appetite loss, blood in stools, constipation, diarrhea or heartburn  · Genitourinary: No dysuria, trouble voiding, or hematuria  · Musculoskeletal:  No gait disturbance, weakness or joint complaints  · Integumentary: No rash or pruritis  · Neurological: No TIA or stroke symptoms  · Psychiatric: No anxiety or depression  · Endocrine: No malaise, fatigue or temperature intolerance  · Hematologic/Lymphatic: No bleeding problems, blood clots or swollen lymph nodes  · Allergic/Immunologic: No nasal congestion or hives  All systems negative except as marked.    Objective:  BP (!) 102/56   Pulse 80   Resp 16   Wt 178 lb (80.7 kg)   BMI 27.88 kg/m²   Wt Readings from Last 3 Encounters:   21 178 lb (80.7 kg)   04/16/21 170 lb (77.1 kg)   04/15/21 174 lb 6.4 oz (79.1 kg)     Body mass index is 27.88 kg/m². GENERAL - Alert, oriented, pleasant, in no apparent distress,normal grooming  HEENT  pupils are intact, cornea intact, conjunctive normal color, ears are normal in exam,  Neck - Supple. No jugular venous distention noted. No carotid bruits, no apical -carotid delay  Respiratory:  Normal breath sounds, No respiratory distress, No wheezing, No chest tenderness. ,no use of accessory muscles, diaphragm movement is normal  Cardiovascular: (PMI) apex non displaced,no lifts no thrills, no s3,no s4, Normal heart rate, Normal rhythm, No murmurs, No rubs, No gallops. Carotid arteries pulse and amplitude are normal no bruit, no abdominal bruit noted ( normal abdominal aorta ausculation),   Extremities - No cyanosis, clubbing, or significant edema, no varicose veins    Abdomen  No masses, tenderness, or organomegaly, no hepato-splenomegally, no bruits  Musculoskeletal  No significant edema, no kyphosis or scoliosis, no deformity in any extremity noted, muscle strength and tone are normal  Skin: no ulcer,no scar,no stasis dermatitis   Neurologic  alert oriented times 3,Cranial nerves II through XII are grossly intact. There were no gross focal neurologic abnormalities. Psychiatric: normal mood and affect    Lab Results   Component Value Date    CKTOTAL 53 05/19/2020     BNP:  No results found for: BNP  PT/INR:  No results found for: PTINR  Lab Results   Component Value Date    LABA1C 6.1 06/08/2014    LABA1C 5.8 06/03/2014     Lab Results   Component Value Date    CHOL 130 09/18/2019    TRIG 93 09/18/2019    HDL 55 04/08/2021    LDLCALC 46 09/18/2019    LDLDIRECT 59 04/08/2021     Lab Results   Component Value Date    ALT 44 (H) 04/15/2021    AST 39 (H) 04/15/2021     TSH:    Lab Results   Component Value Date    TSH 2.76 09/18/2019       Impression:  Demetrice Garcia is a 80 y. o.year old who  has a past medical history of

## 2021-04-29 ENCOUNTER — TELEPHONE (OUTPATIENT)
Dept: CARDIOLOGY CLINIC | Age: 86
End: 2021-04-29

## 2021-05-07 ENCOUNTER — INITIAL CONSULT (OUTPATIENT)
Dept: PULMONOLOGY | Age: 86
End: 2021-05-07
Payer: MEDICARE

## 2021-05-07 VITALS — SYSTOLIC BLOOD PRESSURE: 94 MMHG | HEART RATE: 75 BPM | OXYGEN SATURATION: 78 % | DIASTOLIC BLOOD PRESSURE: 62 MMHG

## 2021-05-07 DIAGNOSIS — Z01.818 PREOP TESTING: ICD-10-CM

## 2021-05-07 DIAGNOSIS — Z00.00 HEALTHCARE MAINTENANCE: ICD-10-CM

## 2021-05-07 DIAGNOSIS — J96.01 ACUTE RESPIRATORY FAILURE WITH HYPOXIA (HCC): Primary | ICD-10-CM

## 2021-05-07 DIAGNOSIS — I50.42 CHRONIC COMBINED SYSTOLIC AND DIASTOLIC CONGESTIVE HEART FAILURE (HCC): ICD-10-CM

## 2021-05-07 DIAGNOSIS — G47.33 OBSTRUCTIVE SLEEP APNEA: ICD-10-CM

## 2021-05-07 PROCEDURE — G8417 CALC BMI ABV UP PARAM F/U: HCPCS | Performed by: NURSE PRACTITIONER

## 2021-05-07 PROCEDURE — G8427 DOCREV CUR MEDS BY ELIG CLIN: HCPCS | Performed by: NURSE PRACTITIONER

## 2021-05-07 PROCEDURE — 99214 OFFICE O/P EST MOD 30 MIN: CPT | Performed by: NURSE PRACTITIONER

## 2021-05-07 NOTE — PROGRESS NOTES
Subjective:   CHIEF COMPLAINT / HPI:       Keren Terrazas is a 80 y.o. male with history of chronic combined systolic diastolic congestive heart failure, PAF, anemia, CAD s/p CABG, CKD stage III, dyspnea, HTN, HLD, ALMA not on CPAP excessive daytime sleepiness, gait disorder, generalized weakness, presents to pulmonary clinic for evaluation for oxygen need. Upon initial arrival to Pulmonary clinic O2 sat was noted at 78% in room air in waiting area. He was brought back to the exam room O2 sat was rechecked and was noted at 76% in room air at rest.  He was placed on supplemental oxygen at 2 L O2 sat increased to 88% at rest in room air. He was then increased to 2.5 L, O2 sat increased to 94% at rest in room air. He was then ambulated short distance with O2 sat noted 92% on 2.5 L per nasal cannula. We were unable to ambulate him more than 20 feet due to lack of portable oxygen in the clinic today. He remains on 2.5 L/min during interview and assessment. Notable ease of respiratory effort after O2 was applied. Ray Rodgers was recently admitted to [facility] through the ED on 4/15/2021 for increased shortness of breath and fluid retention. Tatiana Puri He was found to have acute on chronic systolic congestive heart failure with ischemic cardiomyopathy and was transferred to [facility] on 4/16/2021 for left and right heart cath. He was discharged on 4/17/2021. Ray Rodgers states he has been very tired for \"sometime\". His wife who accompanies him states that she has noticed a significant drop in his activity over the last month to 2 months. He had a cardiac work-up including an echocardiogram on 4/14/2021  which demonstrated ejection fraction of 25%, RVSP of 53 mm per mercury indicating moderate pulmonary hypertension.   Left ventricular function severely abnormal, grade 2 diastolic dysfunction, global hypokinesis, moderate dilated left atrium, mild aortic stenosis with mean gradient of 8 mmHg, mild aortic regurgitation, mitral annular calcification, moderate mitral and tricuspid regurgitation. He then completed a heart cath on 4/16/2021 which showed severe native CAD. His prior bypasses, LIMA to AMARJIT and SVG to OM were noted patent. His wife states that they were told he had severe heart disease and they recommended that he see a pulmonologist for assessment of oxygen needs. Stef Leung is a former smoker, he quit in [facility]. He states he was never a heavy smoker and never smoked more than a ppd \"if that\" when working. He retired for [employer] in [address] [place] [address]. He states he was tested for sleep apnea 12/5/2018, AHI was 46.7. He was to start on BiPAP but refused. Stef Leung states they are practicing social distancing, wearing a mask when out in public, washing hands frequently or using hand . Denies any fever, chills, malaise, change in sensation of taste or smell, headache or lightheadedness. Denies any known contacts with persons with respiratory infection, positive for coronavirus or under investigation for possible coronavirus exposure. Influenza immunization: 9/28/2019  Pneumococcal immunization: Prevnar 13 on 1/26/2016, PPV 10/29/2018  COVID-19 immunization: 2/12/2021, 1/15/2021  Smoking history: Prior smoker, quit 1985, 25-pack-year history  PCP: Sol Lopez PA-C    Past Medical History:  Past Medical History:   Diagnosis Date    [facility]     CAD (coronary artery disease)     S/P CABG x 4 6/2014    [facility] ([facility])     Chronic back pain     Excessive daytime sleepiness 09/19/2018    Fractured rib 4th and 7th left rib    fracture 4th and 7th ribs    H/O cardiovascular stress test 05/22/2014    EF 50%. There is evidence of mild to moderate ischemia in the mid inferolateral regions.  H/O diagnostic tests 01/27/2011    Aortic Duplex scan. Normal study.      H/O Doppler ultrasound 07/19/2017    Carotid-Moderate disease of the (NITROSTAT) 0.4 MG SL tablet, PLACE 1 TABLET UNDER THE TONGUE EVERY 5 MINUTES AS NEEDED FOR CHEST PAIN, Disp: 25 tablet, Rfl: 2    No Known Allergies    Social History:    Social History     Socioeconomic History    Marital status:      Spouse name: Not on file    Number of children: Not on file    Years of education: Not on file    Highest education level: Not on file   Occupational History    Not on file   Social Needs    Financial resource strain: Not on file    Food insecurity     Worry: Not on file     Inability: Not on file    Transportation needs     Medical: Not on file     Non-medical: Not on file   Tobacco Use    Smoking status: Former Smoker     Packs/day: 0.75     Years: 33.00     Pack years: 24.75     Start date: 1951     Quit date: 1983     Years since quittin.3    Smokeless tobacco: Never Used   Substance and Sexual Activity    Alcohol use: No    Drug use: No    Sexual activity: Yes     Partners: Female     Comment:    Lifestyle    Physical activity     Days per week: Not on file     Minutes per session: Not on file    Stress: Not on file   Relationships    Social connections     Talks on phone: Not on file     Gets together: Not on file     Attends [employer] service: Not on file     Active member of club or organization: Not on file     Attends meetings of clubs or organizations: Not on file     Relationship status: Not on file    Intimate partner violence     Fear of current or ex partner: Not on file     Emotionally abused: Not on file     Physically abused: Not on file     Forced sexual activity: Not on file   Other Topics Concern    Not on file   Social History Narrative    Not on file       Family History:    Family History   Problem Relation Age of Onset    Heart Disease Father          REVIEW OF SYSTEMS:    CONSTITUTIONAL:  Negative for fevers, chills, diaphoresis, activity change, appetite change, night sweats, unexpected weight change.    HEENT: supraclavicular adenopathy. No cervical adenopathy  EXTREMITIES: No edema, no inflammation, no tenderness, no clubbing of digits  NEURO: Oriented X 3, No focal deficits  SKIN: warm and dry      RADIOLOGY:  4/15/2021 standard 2 view chest x-ray  Stable cardiomediastinal silhouette. Similar background bronchial wall thickening. No new focal consolidations. No pleural effusions or pneumothorax. Similar mild compression deformity of the mid thoracic spine. 5/18/2020 CT chest without contrast  Mild cardiomegaly with left atrial and left ventricular dilation mild left ventricular septal hypertrophy, calcification of AV    Partial calcification of right lower paratracheal and right hilar lymph nodes-suggestive of prior granulomatosis disease. Peribronchovascular groundglass bilaterally with sparing of the right middle lobe, most prominent in the left upper lobe -contusion is a consideration with the appearance otherwise suggestive of an infectious or inflammatory process such as bronchiolitis. Patchy linear opacities bilaterally most likely scarring or atelectatic cysts. Trace left pleural effusion. Acute nondisplaced to minimally displaced fracture of anterior left fourth through seventh ribs. Grade 2 chest wall injury. Healing mildly displaced fracture of the posterior right 11th rib with callus formation. Remote fractures of multiple anterior right ribs. PFT:   To be obtained    Assessment      1. Acute respiratory failure with hypoxia ([address]    2. Chronic combined systolic and diastolic congestive heart failure ([address]    3. Obstructive sleep apnea    4. Preop testing    5. Healthcare maintenance        Plan:  Fabian O2 sat initially was noted at 78% on intake. By the time he ambulated back to the exam room O2 sat was 76% he was placed on oxygen at 2 L/min O2 sat increased to 88%. He was then placed on 2.5 L O2 sat increased to 94%.   He was ambulated short distance due to limitation of oxygen conserving device in pulmonary clinic O2 sat with activity noted 94% to 92% on 2.5 L oxygen. Further qualifies for supplemental oxygen is recommended that he wear 24/7. I have contacted [facility] home medical equipment they are able to deliver oxygen today. He is aware he will have a concentrator. Due to his ambulation and gait disturbance and need for walker I feel he would significantly benefit from an OCD and I am recommending testing. He is aware it may be several weeks before he is able to receive his OCD. Doneta Hamman is currently only on a as needed albuterol rescue inhaler which he states he uses 3-4 times a day for shortness of breath. I do not have a pulmonary function test to review I will order a pulmonary function test and then will make changes to his bronchodilator therapy at that time as needed. He denies any need for refill of albuterol rescue inhaler he states he still has sufficient doses and refill from hospital admission when he was placed on albuterol MDI as needed shortness of breath    Doneta Hamman does have a history of chronic combined systolic and diastolic heart failure. He recently had cardiac work-up including echocardiogram and cath. His cath demonstrated that prior grafts he had had from CABG were patent. His echocardiogram demonstrated severe heart failure with ejection fraction of 25%, moderate pulmonary hypertension with RSVP 53mmHg. Cardiology was suggesting medical management at this time. He is currently on Lopressor 25 mg twice daily, Lasix 40 mg twice daily, Vytorin 10-20 mg daily, Plavix 75 mg daily. Cardiology is managing his pulmonary hypertension and medications. I was concerned in the office of Rolando's extreme sleepiness however once on oxygen for duration of interview and exam he did become more awake.   I did review his prior CO2 levels on chemistry panels form past which did indicate a slow increase in his CO2 but no elevations above normal..  There is no report of recent ABG. We have discussed the COVID-19 immunization and my recommendations. We have discussed the mechanism in which immunization aids in protecting Rolando during coronavirus -19 outbreaks. He has received both doses of his COVID-19 immunization. His daughter did die January 0748 due to complications from Covid 19 infection    We have discussed the need to maintain yearly flu immunization, pneumococcal vaccination. We have discussed Coronavirus precaution including: social distancing when needing to be in public, handwashing practice, wiping items touched in public such as gas pumps, door handles, shopping carts, etc. Self monitoring for infection - fever, chills, cough, SOB. Should they develop symptoms they should call office for further instructions. Return in about 5 weeks (around 6/11/2021) for Follow Up. This dictation was performed with a verbal recognition program and it was checked for errors. It is possible that there are still dictated errors within this office note. Any errors should be brought immediately to my attention for correction. All efforts were made to ensure that this office note is accurate.        Electronically signed by TRISH Mas CNP on 5/7/2021 at 4:37 PM

## 2021-05-10 ENCOUNTER — TELEPHONE (OUTPATIENT)
Dept: FAMILY MEDICINE CLINIC | Age: 86
End: 2021-05-10

## 2021-05-10 NOTE — TELEPHONE ENCOUNTER
Trisha Milton works for [employer] stating she needs your signature and also needs your licenses number for Camilo Pinedo patient. The patient has passed away and needs a signature for patients death certificate. Will you sign?  Please return Eliane call at [phone]  Please advise     Thank you

## 2021-05-10 NOTE — TELEPHONE ENCOUNTER
[facility] YVONNE VASQUEZ  called this on-call provider at 8:50 PM on 2021 to report patient death. Patient was found down in his home and was supposed to start hospice on 5/10/2021. Patient already transferred to  home.   PCP Lina Charlton notified

## 2021-05-12 ENCOUNTER — TELEPHONE (OUTPATIENT)
Dept: FAMILY MEDICINE CLINIC | Age: 86
End: 2021-05-12

## 2021-05-12 NOTE — TELEPHONE ENCOUNTER
I have the death certificate, but I don't know the cause of death. Do you know the circumstances of his death?